# Patient Record
Sex: MALE | Race: OTHER | Employment: OTHER | URBAN - METROPOLITAN AREA
[De-identification: names, ages, dates, MRNs, and addresses within clinical notes are randomized per-mention and may not be internally consistent; named-entity substitution may affect disease eponyms.]

---

## 2017-07-20 ENCOUNTER — OFFICE VISIT (OUTPATIENT)
Dept: INTERNAL MEDICINE CLINIC | Age: 82
End: 2017-07-20

## 2017-07-20 VITALS
SYSTOLIC BLOOD PRESSURE: 182 MMHG | HEART RATE: 64 BPM | HEIGHT: 67 IN | RESPIRATION RATE: 14 BRPM | DIASTOLIC BLOOD PRESSURE: 68 MMHG | BODY MASS INDEX: 36.95 KG/M2 | WEIGHT: 235.4 LBS

## 2017-07-20 DIAGNOSIS — E78.2 MIXED HYPERLIPIDEMIA: ICD-10-CM

## 2017-07-20 DIAGNOSIS — M15.9 PRIMARY OSTEOARTHRITIS INVOLVING MULTIPLE JOINTS: ICD-10-CM

## 2017-07-20 DIAGNOSIS — I12.9 HYPERTENSIVE KIDNEY DISEASE WITH CHRONIC KIDNEY DISEASE STAGE III (HCC): Primary | ICD-10-CM

## 2017-07-20 DIAGNOSIS — N18.30 HYPERTENSIVE KIDNEY DISEASE WITH CHRONIC KIDNEY DISEASE STAGE III (HCC): Primary | ICD-10-CM

## 2017-07-20 DIAGNOSIS — C80.1 CANCER (HCC): ICD-10-CM

## 2017-07-20 DIAGNOSIS — E66.09 NON MORBID OBESITY DUE TO EXCESS CALORIES: ICD-10-CM

## 2017-07-20 DIAGNOSIS — E11.9 CONTROLLED TYPE 2 DIABETES MELLITUS WITHOUT COMPLICATION, WITHOUT LONG-TERM CURRENT USE OF INSULIN (HCC): ICD-10-CM

## 2017-07-20 DIAGNOSIS — C61 PROSTATE CANCER (HCC): ICD-10-CM

## 2017-07-20 DIAGNOSIS — N18.30 CHRONIC KIDNEY DISEASE, STAGE 3 (HCC): ICD-10-CM

## 2017-07-20 PROBLEM — Z87.891 FORMER SMOKER: Status: ACTIVE | Noted: 2017-07-20

## 2017-07-20 PROBLEM — Z12.11 COLON CANCER SCREENING: Status: ACTIVE | Noted: 2017-07-20

## 2017-07-20 PROBLEM — M19.90 DJD (DEGENERATIVE JOINT DISEASE): Status: ACTIVE | Noted: 2017-07-20

## 2017-07-20 PROBLEM — Z79.899 ON STATIN THERAPY: Status: ACTIVE | Noted: 2017-07-20

## 2017-07-20 PROBLEM — M35.3 POLYMYALGIA RHEUMATICA (HCC): Status: ACTIVE | Noted: 2017-07-20

## 2017-07-20 PROBLEM — E78.5 HYPERLIPIDEMIA: Status: ACTIVE | Noted: 2017-07-20

## 2017-07-20 PROBLEM — I49.9 CARDIAC ARRHYTHMIA: Status: ACTIVE | Noted: 2017-07-20

## 2017-07-20 LAB
BUN BLD-MCNC: 22 MG/DL (ref 9–20)
CALCIUM BLD-MCNC: 10.1 MG/DL (ref 8.4–10.2)
CHLORIDE BLD-SCNC: 105 MMOL/L (ref 98–107)
CHOLEST SERPL-MCNC: 87 MG/DL (ref 0–200)
CO2 POC: 25 MMOL/L (ref 22–32)
CREAT BLD-MCNC: 1.4 MG/DL (ref 0.8–1.5)
EGFR (POC): 46.5
GLUCOSE POC: 173 MG/DL (ref 75–110)
HBA1C MFR BLD HPLC: 7.8 % (ref 4–5.7)
HDLC SERPL-MCNC: 37 MG/DL (ref 35–130)
LDL CHOLESTEROL POC: 26.6 MG/DL (ref 0–130)
POTASSIUM SERPL-SCNC: 5 MMOL/L (ref 3.6–5)
SODIUM SERPL-SCNC: 147 MMOL/L (ref 137–145)
TCHOL/HDL RATIO (POC): 2.4 (ref 0–4)
TRIGL SERPL-MCNC: 117 MG/DL (ref 0–200)
VLDLC SERPL CALC-MCNC: 23.4 MG/DL

## 2017-07-20 RX ORDER — LISINOPRIL 40 MG/1
40 TABLET ORAL DAILY
COMMUNITY
Start: 2017-07-15 | End: 2018-01-12 | Stop reason: SDUPTHER

## 2017-07-20 RX ORDER — GLIMEPIRIDE 4 MG/1
TABLET ORAL
COMMUNITY
Start: 2017-07-15 | End: 2017-10-27 | Stop reason: ALTCHOICE

## 2017-07-20 RX ORDER — AMLODIPINE BESYLATE 5 MG/1
5 TABLET ORAL DAILY
COMMUNITY
Start: 2017-07-09 | End: 2017-10-13 | Stop reason: SDUPTHER

## 2017-07-20 RX ORDER — METFORMIN HYDROCHLORIDE 500 MG/1
1000 TABLET, EXTENDED RELEASE ORAL 2 TIMES DAILY
COMMUNITY
Start: 2017-07-15 | End: 2018-02-21 | Stop reason: SDUPTHER

## 2017-07-20 RX ORDER — VALSARTAN 160 MG/1
160 TABLET ORAL DAILY
COMMUNITY
Start: 2017-07-15 | End: 2017-10-27 | Stop reason: ALTCHOICE

## 2017-07-20 RX ORDER — ASPIRIN 81 MG/1
TABLET ORAL DAILY
COMMUNITY

## 2017-07-20 RX ORDER — LEVOCARNITINE 500 MG
TABLET ORAL
COMMUNITY
End: 2017-10-27 | Stop reason: ALTCHOICE

## 2017-07-20 RX ORDER — SIMVASTATIN 20 MG/1
20 TABLET, FILM COATED ORAL
COMMUNITY
Start: 2017-07-09 | End: 2017-09-29 | Stop reason: SDUPTHER

## 2017-07-20 RX ORDER — SITAGLIPTIN 100 MG/1
100 TABLET, FILM COATED ORAL DAILY
COMMUNITY
Start: 2017-07-09 | End: 2018-01-25 | Stop reason: SDUPTHER

## 2017-07-20 RX ORDER — EZETIMIBE 10 MG/1
10 TABLET ORAL DAILY
COMMUNITY
Start: 2017-07-09 | End: 2017-11-14 | Stop reason: SDUPTHER

## 2017-07-20 RX ORDER — GLUCOSAM/CHONDRO/HERB 149/HYAL 750-100 MG
1500 TABLET ORAL DAILY
COMMUNITY

## 2017-07-20 NOTE — PROGRESS NOTES
3 month f/up. No complaints by the patient. 1. Have you been to the ER, urgent care clinic since your last visit? Hospitalized since your last visit?  no    2. Have you seen or consulted any other health care providers outside of the [address] [place] [address] since your last visit? Include any pap smears or colon screening.    no

## 2017-07-20 NOTE — PROGRESS NOTES
Chief Complaint   Patient presents with    Diabetes    Arthritis    Hypertension    Chronic Kidney Disease    Cholesterol Problem       SUBJECTIVE:    Tripp Rodriguez is a 80 y.o. male who returns in follow-up for his hypertension diabetes hyperlipidemia obesity DJD D and other medical problems. He denies any headaches or chest pain or cardiorespiratory GI  complaints or any neurologic complaints. He is taking his medication. He is doing his best to follow his diet. He is not getting a lot of exercise. He has no other complaints on complete review of systems. Current Outpatient Prescriptions   Medication Sig Dispense Refill    amLODIPine (NORVASC) 5 mg tablet Take 5 mg by mouth daily.  ezetimibe (ZETIA) 10 mg tablet Take 10 mg by mouth daily.  glimepiride (AMARYL) 4 mg tablet Take  by mouth every morning.  lisinopril (PRINIVIL, ZESTRIL) 40 mg tablet Take 40 mg by mouth daily.  metFORMIN ER (GLUCOPHAGE XR) 500 mg tablet Take 1,000 mg by mouth two (2) times a day.  simvastatin (ZOCOR) 20 mg tablet Take 20 mg by mouth nightly.  JANUVIA 100 mg tablet Take 100 mg by mouth daily.  valsartan (DIOVAN) 160 mg tablet Take 160 mg by mouth daily.  aspirin delayed-release 81 mg tablet Take  by mouth daily.  Omega-3-DHA-EPA-Fish Oil 1,000 mg (120 mg-180 mg) cap Take 1,200 mg by mouth daily.  niacin-inositol (NIACIN NO FLUSH) 400 mg niacin (500 mg) cap Take  by mouth.        Past Medical History:   Diagnosis Date    Cancer ([facility]     prostate    Diabetes ([facility]     Hypercholesterolemia     Hypertension      Past Surgical History:   Procedure Laterality Date    HX PROSTATECTOMY       Not on File    REVIEW OF SYSTEMS:  General: negative for - chills or fever, or weight loss or gain  ENT: negative for - headaches, nasal congestion or tinnitus  Eyes: no blurred or visual changes  Neck: No stiffness or swollen nodes  Respiratory: negative for - cough, hemoptysis, shortness of breath or wheezing  Cardiovascular : negative for - chest pain, edema, palpitations or shortness of breath  Gastrointestinal: negative for - abdominal pain, blood in stools, heartburn or nausea/vomiting  Genito-Urinary: no dysuria, trouble voiding, or hematuria  Musculoskeletal: negative for - gait disturbance,+ joint pain, negative joint stiffness or joint swelling  Neurological: no TIA or stroke symptoms  Hematologic: no bruises, no bleeding  Lymphatic: no swollen glands  Integument: no lumps, mole changes, nail changes or rash  Endocrine:no malaise/lethargy poly uria or polydipsia or unexpected weight changes        Social History     Social History    Marital status: UNKNOWN     Spouse name: N/A    Number of children: N/A    Years of education: N/A     Social History Main Topics    Smoking status: Former Smoker     Packs/day: 1.00     Years: 50.00     Types: Cigarettes     Start date: 7/20/1955     Quit date: 7/20/2008    Smokeless tobacco: Never Used    Alcohol use No    Drug use: No    Sexual activity: No     Other Topics Concern    None     Social History Narrative    None     History reviewed. No pertinent family history. OBJECTIVE:     Visit Vitals    /68 (BP 1 Location: Left arm, BP Patient Position: Sitting)    Pulse 64  Comment: slightly irregular    Resp 14    Ht 5' 7\" (1.702 m)    Wt 235 lb 6.4 oz (106.8 kg)    BMI 36.87 kg/m2     CONSTITUTIONAL:   well nourished, appears age appropriate  EYES: sclera anicteric, PERRL, EOMI  ENMT:nars clear, moist mucous membranes, pharynx clear  NECK: supple.  Thyroid normal, No JVD or bruits  RESPIRATORY: Chest: clear to ascultation and percussion, normal inspiratory effort  CARDIOVASCULAR: Heart: regular rate and rhythm no murmurs, rubs or gallops, PMI not displaced, No thrills  GASTROINTESTINAL: Abdomen: non distended, soft, non tender, bowel sounds normal  HEMATOLOGIC: no purpura, petechiae or bruising  LYMPHATIC: No lymph node enlargemant  MUSCULOSKELETAL: Extremities: no edema or active synovitis, pulse 1+   INTEGUMENT: No unusual rashes or suspicious skin lesions noted. Nails appear normal.  PERIPHERAL VASCULAR: normal pulses femoral, PT and DP  NEUROLOGIC: non-focal exam, A & O X 3  PSYCHIATRIC:, appropriate affect     ASSESSMENT:   1. Hypertensive kidney disease with chronic kidney disease stage III    2. Cancer ([facility]    3. Controlled type 2 diabetes mellitus without complication, without long-term current use of insulin ([facility]    4. Mixed hyperlipidemia    5. Chronic kidney disease, stage 3    6. Primary osteoarthritis involving multiple joints    7. Prostate cancer ([facility]    8. Non morbid obesity due to excess calories      Impression  1. Hypertension blood pressure is not controlled today he thinks is because he had weights along with the paperwork and everything with the new computer system. I did review his last blood pressure and it was much better at 142/70 so I will not make any changes in medications today  2. Diabetes mellitus we will see what that status is I reviewed his last numbers  3. Hyperlipidemia reviewed his last numbers we will see what the status is today make adjustments if necessary  4. DJD that seems to be stable some chronic joint aches and pains but nothing seems to be new  5. Chronic kidney disease we will see what that status is I reviewed his last numbers  6.   Obesity that is still a problem I reviewed his weight with him today it is 235 that needs to come down we discussed diet exercise weight reduction  We will call her lab will make adjustments if necessary otherwise we will continue his current medications diet and exercise discussed and encouraged we will recheck his blood pressure in follow-up I did not make a change today as noted follow-up scheduled 3 months sooner should there be a problem      PLAN:  .  Orders Placed This Encounter    AMB POC BASIC METABOLIC PANEL    AMB POC HEMOGLOBIN A1C  AMB POC LIPID PROFILE    amLODIPine (NORVASC) 5 mg tablet    ezetimibe (ZETIA) 10 mg tablet    glimepiride (AMARYL) 4 mg tablet    lisinopril (PRINIVIL, ZESTRIL) 40 mg tablet    metFORMIN ER (GLUCOPHAGE XR) 500 mg tablet    simvastatin (ZOCOR) 20 mg tablet    JANUVIA 100 mg tablet    valsartan (DIOVAN) 160 mg tablet    aspirin delayed-release 81 mg tablet    Omega-3-DHA-EPA-Fish Oil 1,000 mg (120 mg-180 mg) cap    niacin-inositol (NIACIN NO FLUSH) 400 mg niacin (500 mg) cap         ATTENTION:   This medical record was transcribed using an electronic medical records system. Although proofread, it may and can contain electronic and spelling errors. Other human spelling and other errors may be present. Corrections may be executed at a later time. Please feel free to contact us for any clarifications as needed. Follow-up Disposition:  Return in about 3 months (around 10/20/2017). Vel Bowling MD    The patient verbalized understanding of the problems and plans as explained.

## 2017-07-20 NOTE — MR AVS SNAPSHOT
Visit Information Date & Time Provider Department Dept. Phone Encounter #  
 7/20/2017 10:40 AM Clifton Krabbe, MD [place] [address] [phone] [id number] Follow-up Instructions Return in about 3 months (around 10/20/2017). Your Appointments 7/20/2017 10:40 AM  
FOLLOW UP 10 with Clifton Krabbe, MD [facility] Appt Note: 3 month follow up [address] [id number] [address] [facility] [id number] Upcoming Health Maintenance Date Due DTaP/Tdap/Td series (1 - Tdap) 4/2/1956 ZOSTER VACCINE AGE 60> 4/2/1995 GLAUCOMA SCREENING Q2Y 4/2/2000 Pneumococcal 65+ Low/Medium Risk (1 of 2 - PCV13) 4/2/2000 MEDICARE YEARLY EXAM 4/2/2000 INFLUENZA AGE 9 TO ADULT 8/1/2017 Allergies as of 7/20/2017  Review Complete On: 7/20/2017 By: Clifton Krabbe, MD  
 Not on File Current Immunizations  Never Reviewed No immunizations on file. Not reviewed this visit You Were Diagnosed With   
  
 Codes Comments Hypertensive kidney disease with chronic kidney disease stage III    -  Primary ICD-10-CM: I12.9, N18.3 ICD-9-CM: 403.90, 585.3 Cancer [facility])     ICD-10-CM: C80.1 ICD-9-CM: 199.1 Controlled type 2 diabetes mellitus without complication, without long-term current use of insulin ([facility]     ICD-10-CM: E11.9 ICD-9-CM: 250.00 Mixed hyperlipidemia     ICD-10-CM: E78.2 ICD-9-CM: 272.2 Chronic kidney disease, stage 3     ICD-10-CM: N18.3 ICD-9-CM: 585.3 Primary osteoarthritis involving multiple joints     ICD-10-CM: M15.0 ICD-9-CM: 715.09 Prostate cancer [facility])     ICD-10-CM: J59 ICD-9-CM: 325 Non morbid obesity due to excess calories     ICD-10-CM: E66.09 
ICD-9-CM: 278.00 Vitals BP Pulse Resp Height(growth percentile) Weight(growth percentile) BMI 182/68 (BP 1 Location: Left arm, BP Patient Position: Sitting) 64 14 5' 7\" (1.702 m) 235 lb 6.4 oz (106.8 kg) 36.87 kg/m2 Smoking Status Former Smoker BMI and BSA Data Body Mass Index Body Surface Area  
 36.87 kg/m 2 2.25 m 2 Preferred Pharmacy Pharmacy Name Phone [facility] [address], [address] [place] [address] Avenue [phone] Your Updated Medication List  
  
   
This list is accurate as of: 7/20/17 10:17 AM.  Always use your most recent med list. amLODIPine 5 mg tablet Commonly known as:  Mari Coppola Take 5 mg by mouth daily. aspirin delayed-release 81 mg tablet Take  by mouth daily. ezetimibe 10 mg tablet Commonly known as:  Analilia Kessler Take 10 mg by mouth daily. glimepiride 4 mg tablet Commonly known as:  AMARYL Take  by mouth every morning. JANUVIA 100 mg tablet Generic drug:  SITagliptin Take 100 mg by mouth daily. lisinopril 40 mg tablet Commonly known as:  Clint Camarillo Take 40 mg by mouth daily. metFORMIN  mg tablet Commonly known as:  GLUCOPHAGE XR Take 1,000 mg by mouth two (2) times a day. NIACIN NO FLUSH 400 mg niacin (500 mg) Cap Generic drug:  niacin-inositol Take  by mouth. Omega-3-DHA-EPA-Fish Oil 1,000 mg (120 mg-180 mg) Cap Take 1,200 mg by mouth daily. simvastatin 20 mg tablet Commonly known as:  ZOCOR Take 20 mg by mouth nightly. valsartan 160 mg tablet Commonly known as:  DIOVAN Take 160 mg by mouth daily. We Performed the Following AMB POC BASIC METABOLIC PANEL [85872 CPT(R)] AMB POC HEMOGLOBIN A1C [60120 CPT(R)] AMB POC LIPID PROFILE [15974 CPT(R)] Follow-up Instructions Return in about 3 months (around 10/20/2017). Introducing [facility]!    
 Willadean Saint introduces [employer] patient portal. Now you can access parts of your medical record, email your doctor's office, and request medication refills online. 1. In your internet browser, go to [email or web address] 2. Click on the First Time User? Click Here link in the Sign In box. You will see the New Member Sign Up page. 3. Enter your [employer] Access Code exactly as it appears below. You will not need to use this code after youve completed the sign-up process. If you do not sign up before the expiration date, you must request a new code. · [employer] Access Code: [email or web address] Expires: 10/18/2017  9:01 AM 
 
4. Enter the last four digits of your Social Security Number (xxxx) and Date of Birth (mm/dd/yyyy) as indicated and click Submit. You will be taken to the next sign-up page. 5. Create a [employer] ID. This will be your [employer] login ID and cannot be changed, so think of one that is secure and easy to remember. 6. Create a [employer] password. You can change your password at any time. 7. Enter your Password Reset Question and Answer. This can be used at a later time if you forget your password. 8. Enter your e-mail address. You will receive e-mail notification when new information is available in [address] 9. Click Sign Up. You can now view and download portions of your medical record. 10. Click the Download Summary menu link to download a portable copy of your medical information. If you have questions, please visit the Frequently Asked Questions section of the [employer] website. Remember, [employer] is NOT to be used for urgent needs. For medical emergencies, dial 911. Now available from your iPhone and Android! Please provide this summary of care documentation to your next provider. If you have any questions after today's visit, please call [phone].

## 2017-07-21 NOTE — PROGRESS NOTES
Blood sugars 173 glycohemoglobin 7.8 both markedly above goal.  You are already on a maximum dose of Januvia 100 mg a day and a maximum dose of metformin.   At this point I would add for Farxiga 5 mg daily continue working on diet and exercise

## 2017-07-26 ENCOUNTER — TELEPHONE (OUTPATIENT)
Dept: INTERNAL MEDICINE CLINIC | Age: 82
End: 2017-07-26

## 2017-07-26 DIAGNOSIS — E11.8 CONTROLLED TYPE 2 DIABETES MELLITUS WITH COMPLICATION, WITHOUT LONG-TERM CURRENT USE OF INSULIN (HCC): Primary | ICD-10-CM

## 2017-07-26 NOTE — TELEPHONE ENCOUNTER
----- Message from Elliot Malagon MD sent at 7/21/2017  2:35 PM EDT -----  Blood sugars 173 glycohemoglobin 7.8 both markedly above goal.  You are already on a maximum dose of Januvia 100 mg a day and a maximum dose of metformin.   At this point I would add for Farxiga 5 mg daily continue working on diet and exercise

## 2017-07-26 NOTE — TELEPHONE ENCOUNTER
Blood sugars 173 glycohemoglobin 7.8 both markedly above goal.  You are already on a maximum dose of Januvia 100 mg a day and a maximum dose of metformin. At this point I would add for Farxiga 5 mg daily continue working on diet and exercise  Spoke with pt and he agreed to start [place] .

## 2017-08-02 ENCOUNTER — TELEPHONE (OUTPATIENT)
Dept: INTERNAL MEDICINE CLINIC | Age: 82
End: 2017-08-02

## 2017-08-02 NOTE — TELEPHONE ENCOUNTER
Requested Prescriptions     Pending Prescriptions Disp Refills    empagliflozin (JARDIANCE) 10 mg tablet 30 Tab 5     Sig: Take 1 Tab by mouth daily. Will replace order for Reidcarli Stephen which is not covered by pt insurance.

## 2017-08-04 NOTE — TELEPHONE ENCOUNTER
Informed by pharmacy insurance will not cover [facility]. Will need to change to Jardiance.

## 2017-09-29 DIAGNOSIS — E78.2 MIXED HYPERLIPIDEMIA: Primary | ICD-10-CM

## 2017-09-29 NOTE — TELEPHONE ENCOUNTER
Requested Prescriptions     Pending Prescriptions Disp Refills    simvastatin (ZOCOR) 20 mg tablet 90 Tab 3     Sig: Take 1 Tab by mouth nightly.

## 2017-10-02 RX ORDER — SIMVASTATIN 20 MG/1
20 TABLET, FILM COATED ORAL
Qty: 90 TAB | Refills: 3 | Status: SHIPPED | OUTPATIENT
Start: 2017-10-02 | End: 2018-11-09 | Stop reason: SDUPTHER

## 2017-10-13 RX ORDER — AMLODIPINE BESYLATE 5 MG/1
5 TABLET ORAL DAILY
Qty: 90 TAB | Refills: 3 | Status: SHIPPED | OUTPATIENT
Start: 2017-10-13 | End: 2018-11-09 | Stop reason: SDUPTHER

## 2017-10-13 NOTE — TELEPHONE ENCOUNTER
Requested Prescriptions     Pending Prescriptions Disp Refills    amLODIPine (NORVASC) 5 mg tablet 90 Tab 3     Sig: Take 1 Tab by mouth daily.

## 2017-10-27 ENCOUNTER — OFFICE VISIT (OUTPATIENT)
Dept: INTERNAL MEDICINE CLINIC | Age: 82
End: 2017-10-27

## 2017-10-27 VITALS
RESPIRATION RATE: 18 BRPM | OXYGEN SATURATION: 98 % | BODY MASS INDEX: 35.25 KG/M2 | TEMPERATURE: 97.5 F | HEART RATE: 60 BPM | SYSTOLIC BLOOD PRESSURE: 139 MMHG | HEIGHT: 67 IN | WEIGHT: 224.6 LBS | DIASTOLIC BLOOD PRESSURE: 68 MMHG

## 2017-10-27 DIAGNOSIS — N18.30 CHRONIC KIDNEY DISEASE, STAGE 3 (HCC): ICD-10-CM

## 2017-10-27 DIAGNOSIS — E66.09 NON MORBID OBESITY DUE TO EXCESS CALORIES: ICD-10-CM

## 2017-10-27 DIAGNOSIS — M15.9 PRIMARY OSTEOARTHRITIS INVOLVING MULTIPLE JOINTS: ICD-10-CM

## 2017-10-27 DIAGNOSIS — N18.30 HYPERTENSIVE KIDNEY DISEASE WITH CHRONIC KIDNEY DISEASE STAGE III (HCC): Primary | ICD-10-CM

## 2017-10-27 DIAGNOSIS — M35.3 POLYMYALGIA RHEUMATICA (HCC): ICD-10-CM

## 2017-10-27 DIAGNOSIS — E78.2 MIXED HYPERLIPIDEMIA: ICD-10-CM

## 2017-10-27 DIAGNOSIS — Z00.00 MEDICARE ANNUAL WELLNESS VISIT, INITIAL: ICD-10-CM

## 2017-10-27 DIAGNOSIS — C61 PROSTATE CANCER (HCC): ICD-10-CM

## 2017-10-27 DIAGNOSIS — Z23 ENCOUNTER FOR IMMUNIZATION: ICD-10-CM

## 2017-10-27 DIAGNOSIS — I12.9 HYPERTENSIVE KIDNEY DISEASE WITH CHRONIC KIDNEY DISEASE STAGE III (HCC): Primary | ICD-10-CM

## 2017-10-27 DIAGNOSIS — E11.9 CONTROLLED TYPE 2 DIABETES MELLITUS WITHOUT COMPLICATION, WITHOUT LONG-TERM CURRENT USE OF INSULIN (HCC): ICD-10-CM

## 2017-10-27 LAB
ALBUMIN SERPL-MCNC: 4.5 G/DL (ref 3.9–5.4)
ALKALINE PHOS POC: 67 U/L (ref 38–126)
ALT SERPL-CCNC: 37 U/L (ref 9–52)
AST SERPL-CCNC: 39 U/L (ref 14–36)
BUN BLD-MCNC: 19 MG/DL (ref 9–20)
CALCIUM BLD-MCNC: 10.4 MG/DL (ref 8.4–10.2)
CHLORIDE BLD-SCNC: 106 MMOL/L (ref 98–107)
CHOLEST SERPL-MCNC: 92 MG/DL (ref 0–200)
CK (CPK) POC: 424 U/L (ref 30–135)
CO2 POC: 25 MMOL/L (ref 22–32)
CREAT BLD-MCNC: 1.5 MG/DL (ref 0.8–1.5)
EGFR (POC): 42.7
GLUCOSE POC: 85 MG/DL (ref 75–110)
HBA1C MFR BLD HPLC: 6.3 % (ref 4.5–5.7)
HDLC SERPL-MCNC: 38 MG/DL (ref 35–130)
LDL CHOLESTEROL POC: 33.6 MG/DL (ref 0–130)
POTASSIUM SERPL-SCNC: 4.8 MMOL/L (ref 3.6–5)
PROT SERPL-MCNC: 7.6 G/DL (ref 6.3–8.2)
SODIUM SERPL-SCNC: 145 MMOL/L (ref 137–145)
TCHOL/HDL RATIO (POC): 2.4 (ref 0–4)
TOTAL BILIRUBIN POC: 1.3 MG/DL (ref 0.2–1.3)
TRIGL SERPL-MCNC: 102 MG/DL (ref 0–200)
VLDLC SERPL CALC-MCNC: 20.4 MG/DL

## 2017-10-27 NOTE — PROGRESS NOTES
This is an Initial Medicare Annual Wellness Exam (AWV) (Performed 12 months after IPPE or effective date of Medicare Part B enrollment, Once in a lifetime)    I have reviewed the patient's medical history in detail and updated the computerized patient record. He presents today for his initial annual Medicare wellness examination screening questionnaire. He is also here for follow-up of his medical problems include hypertension, hyperlipidemia, diabetes, DJD, and polymyalgia rheumatica. He denies any chest pain shortness breath or cardiorespiratory complaints. He denies any GI/ complaints. He denies headaches or neurological planes. There are no other complaints on complete review of systems. He is taking his medications and trying to follow his diet but knows he could do better with exercise however he has lost weight with his diet. History     Past Medical History:   Diagnosis Date    Cancer [facility])     prostate    Cardiac arrhythmia 7/20/2017    Colon cancer screening 7/20/2017    Diabetes ([facility]     Type II    Diabetes ([facility]     DJD (degenerative joint disease) 7/20/2017    Former smoker 7/20/2017    Hypercholesterolemia     Hyperlipidemia 7/20/2017    Hypertension     Hypertension with renal disease 7/20/2017    On statin therapy 7/20/2017    Polymyalgia rheumatica ([facility] 7/20/2017      Past Surgical History:   Procedure Laterality Date    HX PROSTATECTOMY      1969    HX PROSTATECTOMY       Current Outpatient Prescriptions   Medication Sig Dispense Refill    amLODIPine (NORVASC) 5 mg tablet Take 1 Tab by mouth daily. 90 Tab 3    simvastatin (ZOCOR) 20 mg tablet Take 1 Tab by mouth nightly. 90 Tab 3    empagliflozin (JARDIANCE) 10 mg tablet Take 1 Tab by mouth daily. 30 Tab 5    ezetimibe (ZETIA) 10 mg tablet Take 10 mg by mouth daily.  lisinopril (PRINIVIL, ZESTRIL) 40 mg tablet Take 40 mg by mouth daily.       metFORMIN ER (GLUCOPHAGE XR) 500 mg tablet Take 1,000 mg by mouth two (2) times a day.  JANUVIA 100 mg tablet Take 100 mg by mouth daily.  aspirin delayed-release 81 mg tablet Take  by mouth daily.  Omega-3-DHA-EPA-Fish Oil 1,000 mg (120 mg-180 mg) cap Take 1,200 mg by mouth daily.  glimepiride (AMARYL) 4 mg tablet Take 4 mg by mouth every morning.  nicotinic acid (NIACIN) 500 mg tablet Take 500 mg by mouth Daily (before breakfast). No Known Allergies  History reviewed. No pertinent family history. Social History   Substance Use Topics    Smoking status: Former Smoker     Packs/day: 1.00     Years: 50.00     Types: Cigarettes     Start date: 7/20/1955     Quit date: 7/20/2008    Smokeless tobacco: Never Used    Alcohol use No     Patient Active Problem List   Diagnosis Code    Hypertensive kidney disease with chronic kidney disease stage III I12.9, N18.3    Controlled type 2 diabetes mellitus without complication, without long-term current use of insulin [facility] E11.9    Mixed hyperlipidemia E78.2    Chronic kidney disease, stage 3 N18.3    Primary osteoarthritis involving multiple joints M15.0    Prostate cancer ([facility] C61    Non morbid obesity due to excess calories E66.09    On statin therapy Z79.899    Former smoker Z87.891    Colon cancer screening Z12.11    Cardiac arrhythmia I49.9    Polymyalgia rheumatica ([facility] M35.3    Medicare annual wellness visit, initial Z00.00       Depression Risk Factor Screening:     PHQ over the last two weeks 10/27/2017   Little interest or pleasure in doing things Not at all   Feeling down, depressed or hopeless Not at all   Total Score PHQ 2 0     Alcohol Risk Factor Screening: You do not drink alcohol or very rarely. Functional Ability and Level of Safety:     Hearing Loss  Hearing is good. Activities of Daily Living  The home contains: no safety equipment. Patient does total self care    Fall RiskFall Risk Assessment, last 12 mths 10/27/2017   Able to walk?  Yes   Fall in past 15 months? No       Abuse Screen  Patient is not abused    Cognitive Screening   Evaluation of Cognitive Function:  Has your family/caregiver stated any concerns about your memory: no  Normal     ROS:    Constitutional: He denies fevers, weight loss, sweats. Eyes: No blurred or double vision. ENT: No difficulty with swallowing, taste, speech or smell. Neck: no stiffness or swelling  Respiratory: No cough wheezing or shortness of breath. Cardiovascular: Denies chest pain, palpitations, unexplained indigestion or syncope. Gastrointestinal:  No changes in bowel movements, no abdominal pain, no bloating. Genitourinary:  He denies frequency, nocturia or stranguria. Extremities: No joint pain, stiffness or swelling. Neurological:  No numbness, tingling, burring paresthesias or loss of motor strength. No syncope, dizziness or frequent headache  Lymphatic: no adenopathy noted  Hematologic: no easy bruising or bleeding gums  Skin:  No recent rashes or mole changes. Psychiatric/Behavioral:  Negative for depression. Vitals:    10/27/17 1017 10/27/17 1040   BP: 149/68 139/68   Pulse: 60    Resp: 18    Temp: 97.5 °F (36.4 °C)    TempSrc: Oral    SpO2: 98%    Weight: 224 lb 9.6 oz (101.9 kg)    Height: 5' 7\" (1.702 m)    PainSc:   0 - No pain         PHYSICAL EXAM:    General appearance - alert, well appearing, and in no distress  Mental status - alert, oriented to person, place, and time  HEENT:  Ears - bilateral TM's and external ear canals clear  Eyes - pupillary responses were normal.  Extraocular muscle function intact. Lids and conjunctiva not injected. Fundoscopic exam revealed sharp disc margins. eye movements intact  Pharynx- clear with teeth in good repair. No masses were noted  Neck - supple without thyromegaly or burit. No JVD noted  Lungs - clear to auscultation and percussion  Cardiac- normal rate, regular rhythm without murmurs. PMI not displaced.   No gallop, rub or click  Abdomen - flat, soft, non-tender without palpable organomegaly or mass. No pulsatile mass was felt, and not bruit was heard. Bowel sounds were active  : Circumcised, Testes descended w/o masses  Rectal: normal sphincter tone, prostate normal, no masses, stool brown and hemacult negative  Extremities -  no clubbing cyanosis or edema. No diabetic foot changes noted  Lymphatics - no palpable lymphadenopathy, no hepatosplenomegaly  Hematologic: no petechiae or purpura  Peripheral vascular -Femoral, Dorsalis pedis and posterior tibial pulses felt without difficulty  Skin - no rash or unusual mole change noted  Neurological - Cranial nerves II-XII grossly intact. Motor strength 5/5. DTR's 2+ and symmetric. Station and gait normal.  Normal distal sensation and proprioception all toes both feet  Back exam - full range of motion, no tenderness, palpable spasm or pain on motion  Musculoskeletal - no joint tenderness, deformity or swelling      Patient Care Team   Patient Care Team:  Ruby Lazo MD as PCP - General (Internal Medicine)    Assessment/Plan   Education and counseling provided:  Are appropriate based on today's review and evaluation    ASSESSMENT:   1. Hypertensive kidney disease with chronic kidney disease stage III    2. Controlled type 2 diabetes mellitus without complication, without long-term current use of insulin ([address]    3. Mixed hyperlipidemia    4. Chronic kidney disease, stage 3    5. Primary osteoarthritis involving multiple joints    6. Non morbid obesity due to excess calories    7. Polymyalgia rheumatica ([address]    8. Prostate cancer ([address]    9. Medicare annual wellness visit, initial    10. Encounter for immunization      Impression  1. Hypertension that seem to be stable continue current treatment reviewed with him  2. Diabetes currently stable reviewed prior labs with him and repeat status pending.   3.  Hyperlipidemia prior labs reviewed repeat status pending will make adjustments based upon today's lab if necessary  4. CKD repeat status pending will make further recommendations based on today's lab  5. DJD seems stable  6. Obesity discussed diet exercise weight reduction in the absolute get his weight down to help his overall health  7. History of polymyalgia rheumatica currently asymptomatic and off prednisone for some time now  8. Prostate cancer still followed by the urologist  Flu vaccine given today. Medicare annual wellness examination screening questionnaire completed today. The results were reviewed with him and his wife. Their questions were answered. Lifestyle recommendations and modifications discussed and made. Labs pending as noted. Will make further recommendations based upon those findings. Follow stable continue same and follow-up in 3 months or sooner should be a problem. PLAN:  .  Orders Placed This Encounter    Influenza virus vaccine ([address]) 72 years and older (54669)    AMB POC URINE, MICROALBUMIN, SEMIQUANT (1 RESULT)    AMB POC LIPID PROFILE    AMB POC HEMOGLOBIN A1C    AMB POC COMPREHENSIVE METABOLIC PANEL    AMB POC CK (CPK)         ATTENTION:   This medical record was transcribed using an electronic medical records system. Although proofread, it may and can contain electronic and spelling errors. Other human spelling and other errors may be present. Corrections may be executed at a later time. Please feel free to contact us for any clarifications as needed. Follow-up Disposition:  Return in about 3 months (around 1/27/2018).       Ruby Lazo MD     Health Maintenance Due   Topic Date Due    EYE EXAM RETINAL OR DILATED Q1  04/02/1945    DTaP/Tdap/Td series (1 - Tdap) 04/02/1956    ZOSTER VACCINE AGE 60>  02/02/1995    GLAUCOMA SCREENING Q2Y  04/02/2000

## 2017-10-27 NOTE — PROGRESS NOTES
Imelda Maxwell is a 80 y.o. male  Chief Complaint   Patient presents with    Hypertension    Cholesterol Problem    Diabetes     1. Have you been to an emergency room, urgent clinic, or hospitalized since your last visit? NO  If yes, where when, and reason for visit? 2. Have seen or consulted any other health care provider since your last visit? NO  Please include any pap smears or colon screening in this section  If yes, where when, and reason for visit? 6. Do you have an Advanced Directive/ Living Will in place?  NO  If yes, do we have a copy on file NO  If no, would you like information NO

## 2017-10-27 NOTE — MR AVS SNAPSHOT
Visit Information Date & Time Provider Department Dept. Phone Encounter #  
 10/27/2017  9:40 AM Kade Hudson, Tobias De La Rosa [id number] Your Appointments 2/13/2018  8:40 AM  
FOLLOW UP 10 with MD JAVID Quinn [facility] ([address]) Appt Note: [address]  [address] [id number] [address] [facility] [id number] Upcoming Health Maintenance Date Due  
 FOOT EXAM Q1 4/2/1945 MICROALBUMIN Q1 4/2/1945 EYE EXAM RETINAL OR DILATED Q1 4/2/1945 DTaP/Tdap/Td series (1 - Tdap) 4/2/1956 ZOSTER VACCINE AGE 60> 2/2/1995 GLAUCOMA SCREENING Q2Y 4/2/2000 MEDICARE YEARLY EXAM 4/2/2000 INFLUENZA AGE 9 TO ADULT 8/1/2017 HEMOGLOBIN A1C Q6M 1/20/2018 LIPID PANEL Q1 7/20/2018 Allergies as of 10/27/2017  Review Complete On: 10/27/2017 By: Kdae Hudson MD  
 No Known Allergies Current Immunizations  Never Reviewed Name Date Influenza Vaccine 10/11/2016, 10/15/2015, 9/1/2013 Pneumococcal Vaccine (Unspecified Type) 8/25/2011, 1/1/2010, 12/2/2004 Not reviewed this visit You Were Diagnosed With   
  
 Codes Comments Hypertensive kidney disease with chronic kidney disease stage III    -  Primary ICD-10-CM: I12.9, N18.3 ICD-9-CM: 403.90, 585.3 Controlled type 2 diabetes mellitus without complication, without long-term current use of insulin ([facility]     ICD-10-CM: E11.9 ICD-9-CM: 250.00 Mixed hyperlipidemia     ICD-10-CM: E78.2 ICD-9-CM: 272.2 Chronic kidney disease, stage 3     ICD-10-CM: N18.3 ICD-9-CM: 585.3 Primary osteoarthritis involving multiple joints     ICD-10-CM: M15.0 ICD-9-CM: 715.09 Vitals BP Pulse Temp Resp Height(growth percentile) Weight(growth percentile)  149/68 (BP 1 Location: Left arm, BP Patient Position: Sitting) 60 97.5 °F (36.4 °C) (Oral) 18 5' 7\" (1.702 m) 224 lb 9.6 oz (101.9 kg) SpO2 BMI Smoking Status 98% 35.18 kg/m2 Former Smoker BMI and BSA Data Body Mass Index Body Surface Area  
 35.18 kg/m 2 2.19 m 2 Preferred Pharmacy Pharmacy Name Phone [facility] [address], [address] [place] [address] Avenue [phone] Your Updated Medication List  
  
   
This list is accurate as of: 10/27/17 10:40 AM.  Always use your most recent med list. amLODIPine 5 mg tablet Commonly known as:  Alice Marjorie Take 1 Tab by mouth daily. aspirin delayed-release 81 mg tablet Take  by mouth daily. empagliflozin 10 mg tablet Commonly known as:  Shahrzad Octave Take 1 Tab by mouth daily. ezetimibe 10 mg tablet Commonly known as:  Suellyn Guero Take 10 mg by mouth daily. glimepiride 4 mg tablet Commonly known as:  AMARYL Take 4 mg by mouth every morning. JANUVIA 100 mg tablet Generic drug:  SITagliptin Take 100 mg by mouth daily. lisinopril 40 mg tablet Commonly known as:  Aundra Tavia Take 40 mg by mouth daily. metFORMIN  mg tablet Commonly known as:  GLUCOPHAGE XR Take 1,000 mg by mouth two (2) times a day. nicotinic acid 500 mg tablet Commonly known as:  NIACIN Take 500 mg by mouth Daily (before breakfast). Omega-3-DHA-EPA-Fish Oil 1,000 mg (120 mg-180 mg) Cap Take 1,200 mg by mouth daily. simvastatin 20 mg tablet Commonly known as:  ZOCOR Take 1 Tab by mouth nightly. Introducing \[facility]\"" & HEALTH SERVICES! Shauna Bryant introduces [employer] patient portal. Now you can access parts of your medical record, email your doctor's office, and request medication refills online. 1. In your internet browser, go to [email or web address] 2. Click on the First Time User? Click Here link in the Sign In box. You will see the New Member Sign Up page. 3. Enter your [employer] Access Code exactly as it appears below. You will not need to use this code after youve completed the sign-up process. If you do not sign up before the expiration date, you must request a new code. · [employer] Access Code: [email or web address] Expires: 1/25/2018 10:40 AM 
 
4. Enter the last four digits of your Social Security Number (xxxx) and Date of Birth (mm/dd/yyyy) as indicated and click Submit. You will be taken to the next sign-up page. 5. Create a CelebCalls ID. This will be your Cele[employer]s login ID and cannot be changed, so think of one that is secure and easy to remember. 6. Create a [employer] password. You can change your password at any time. 7. Enter your Password Reset Question and Answer. This can be used at a later time if you forget your password. 8. Enter your e-mail address. You will receive e-mail notification when new information is available in [address]. 9. Click Sign Up. You can now view and download portions of your medical record. 10. Click the Download Summary menu link to download a portable copy of your medical information. If you have questions, please visit the Frequently Asked Questions section of the [employer] website. Remember, [employer]s is NOT to be used for urgent needs. For medical emergencies, dial 911. Now available from your iPhone and Android! Please provide this summary of care documentation to your next provider. Your primary care clinician is listed as Corinne. If you have any questions after today's visit, please call [phone].

## 2017-10-30 LAB — MICROALBUMIN UR TEST STR-MCNC: 100 MG/L (ref 0–20)

## 2017-11-14 DIAGNOSIS — E78.5 HYPERLIPIDEMIA, UNSPECIFIED HYPERLIPIDEMIA TYPE: Primary | ICD-10-CM

## 2017-11-14 RX ORDER — EZETIMIBE 10 MG/1
TABLET ORAL
Qty: 90 TAB | Refills: 3 | Status: SHIPPED | OUTPATIENT
Start: 2017-11-14 | End: 2018-08-17 | Stop reason: ALTCHOICE

## 2017-11-14 NOTE — TELEPHONE ENCOUNTER
Requested Prescriptions     Pending Prescriptions Disp Refills    ezetimibe (ZETIA) 10 mg tablet [Pharmacy Med Name: EZETIMIBE 10MG TAB] 90 Tab 3     Sig: TAKE ONE TABLET BY MOUTH ONCE DAILY AT BEDTIME

## 2018-01-12 DIAGNOSIS — I10 HYPERTENSION, UNSPECIFIED TYPE: Primary | ICD-10-CM

## 2018-01-12 RX ORDER — LISINOPRIL 40 MG/1
40 TABLET ORAL DAILY
Qty: 90 TAB | Refills: 3 | Status: SHIPPED | OUTPATIENT
Start: 2018-01-12 | End: 2018-02-21 | Stop reason: SDUPTHER

## 2018-01-12 NOTE — TELEPHONE ENCOUNTER
Requested Prescriptions     Pending Prescriptions Disp Refills    lisinopril (PRINIVIL, ZESTRIL) 40 mg tablet 90 Tab 3     Sig: Take 1 Tab by mouth daily.

## 2018-01-26 RX ORDER — SITAGLIPTIN 100 MG/1
TABLET, FILM COATED ORAL
Qty: 90 TAB | Refills: 3 | Status: SHIPPED | OUTPATIENT
Start: 2018-01-26 | End: 2019-02-12 | Stop reason: SDUPTHER

## 2018-01-26 RX ORDER — VALSARTAN 160 MG/1
TABLET ORAL
Qty: 90 TAB | Refills: 4 | Status: SHIPPED | OUTPATIENT
Start: 2018-01-26 | End: 2018-08-09

## 2018-02-11 PROBLEM — N18.30 CONTROLLED TYPE 2 DIABETES MELLITUS WITH STAGE 3 CHRONIC KIDNEY DISEASE, WITHOUT LONG-TERM CURRENT USE OF INSULIN (HCC): Status: ACTIVE | Noted: 2017-07-20

## 2018-02-11 PROBLEM — E11.22 CONTROLLED TYPE 2 DIABETES MELLITUS WITH STAGE 3 CHRONIC KIDNEY DISEASE, WITHOUT LONG-TERM CURRENT USE OF INSULIN (HCC): Status: ACTIVE | Noted: 2017-07-20

## 2018-02-13 ENCOUNTER — OFFICE VISIT (OUTPATIENT)
Dept: INTERNAL MEDICINE CLINIC | Age: 83
End: 2018-02-13

## 2018-02-13 VITALS
TEMPERATURE: 97.6 F | WEIGHT: 224.8 LBS | BODY MASS INDEX: 35.28 KG/M2 | OXYGEN SATURATION: 94 % | RESPIRATION RATE: 16 BRPM | DIASTOLIC BLOOD PRESSURE: 70 MMHG | HEIGHT: 67 IN | SYSTOLIC BLOOD PRESSURE: 136 MMHG | HEART RATE: 61 BPM

## 2018-02-13 DIAGNOSIS — N18.30 HYPERTENSIVE KIDNEY DISEASE WITH CHRONIC KIDNEY DISEASE STAGE III (HCC): Primary | ICD-10-CM

## 2018-02-13 DIAGNOSIS — C61 PROSTATE CANCER (HCC): ICD-10-CM

## 2018-02-13 DIAGNOSIS — E11.22 CONTROLLED TYPE 2 DIABETES MELLITUS WITH STAGE 3 CHRONIC KIDNEY DISEASE, WITHOUT LONG-TERM CURRENT USE OF INSULIN (HCC): ICD-10-CM

## 2018-02-13 DIAGNOSIS — E66.09 CLASS 2 OBESITY DUE TO EXCESS CALORIES WITHOUT SERIOUS COMORBIDITY WITH BODY MASS INDEX (BMI) OF 35.0 TO 35.9 IN ADULT: ICD-10-CM

## 2018-02-13 DIAGNOSIS — N18.30 CHRONIC KIDNEY DISEASE, STAGE 3 (HCC): ICD-10-CM

## 2018-02-13 DIAGNOSIS — E78.2 MIXED HYPERLIPIDEMIA: ICD-10-CM

## 2018-02-13 DIAGNOSIS — M35.3 POLYMYALGIA RHEUMATICA (HCC): ICD-10-CM

## 2018-02-13 DIAGNOSIS — I12.9 HYPERTENSIVE KIDNEY DISEASE WITH CHRONIC KIDNEY DISEASE STAGE III (HCC): Primary | ICD-10-CM

## 2018-02-13 DIAGNOSIS — N18.30 CONTROLLED TYPE 2 DIABETES MELLITUS WITH STAGE 3 CHRONIC KIDNEY DISEASE, WITHOUT LONG-TERM CURRENT USE OF INSULIN (HCC): ICD-10-CM

## 2018-02-13 DIAGNOSIS — M15.9 PRIMARY OSTEOARTHRITIS INVOLVING MULTIPLE JOINTS: ICD-10-CM

## 2018-02-13 LAB
ALBUMIN SERPL-MCNC: 4.3 G/DL (ref 3.9–5.4)
ALKALINE PHOS POC: 62 U/L (ref 38–126)
ALT SERPL-CCNC: 33 U/L (ref 9–52)
AST SERPL-CCNC: 27 U/L (ref 14–36)
BACTERIA UA POCT, BACTPOCT: NORMAL
BILIRUB UR QL STRIP: NEGATIVE
BUN BLD-MCNC: 22 MG/DL (ref 9–20)
CALCIUM BLD-MCNC: 10.4 MG/DL (ref 8.4–10.2)
CASTS UA POCT: 0
CHLORIDE BLD-SCNC: 107 MMOL/L (ref 98–107)
CHOLEST SERPL-MCNC: 93 MG/DL (ref 0–200)
CK (CPK) POC: 83 U/L (ref 30–135)
CLUE CELLS, CLUEPOCT: NEGATIVE
CO2 POC: 25 MMOL/L (ref 22–32)
CREAT BLD-MCNC: 1.4 MG/DL (ref 0.8–1.5)
CRYSTALS UA POCT, CRYSPOCT: NEGATIVE
EGFR (POC): 46.5
EPITHELIAL CELLS POCT: NORMAL
GLUCOSE POC: 110 MG/DL (ref 75–110)
GLUCOSE UR-MCNC: NEGATIVE MG/DL
GRAN# POC: 7.4 K/UL (ref 2–7.8)
GRAN% POC: 71.8 % (ref 37–92)
HBA1C MFR BLD HPLC: 5.9 % (ref 4.5–5.7)
HCT VFR BLD CALC: 40.3 % (ref 37–51)
HDLC SERPL-MCNC: 38 MG/DL (ref 35–130)
HGB BLD-MCNC: 13.2 G/DL (ref 12–18)
KETONES P FAST UR STRIP-MCNC: NEGATIVE MG/DL
LDL CHOLESTEROL POC: 34.4 MG/DL (ref 0–130)
LY# POC: 2.4 K/UL (ref 0.6–4.1)
LY% POC: 24 % (ref 10–58.5)
MCH RBC QN: 29.7 PG (ref 26–32)
MCHC RBC-ENTMCNC: 32.9 G/DL (ref 30–36)
MCV RBC: 90 FL (ref 80–97)
MICROALBUMIN UR TEST STR-MCNC: 20 MG/L (ref 0–20)
MID #, POC: 0.4 K/UL (ref 0–1.8)
MID% POC: 4.2 % (ref 0.1–24)
MUCUS UA POCT, MUCPOCT: NORMAL
PH UR STRIP: 5 [PH] (ref 5–7)
PLATELET # BLD: 245 K/UL (ref 140–440)
POTASSIUM SERPL-SCNC: 4.5 MMOL/L (ref 3.6–5)
PROT SERPL-MCNC: 7.6 G/DL (ref 6.3–8.2)
PROT UR QL STRIP: NORMAL
PSA SERPL-MCNC: 0.1 NG/ML (ref 0–4)
RBC # BLD: 4.46 M/UL (ref 4.2–6.3)
RBC UA POCT, RBCPOCT: NORMAL
SODIUM SERPL-SCNC: 144 MMOL/L (ref 137–145)
SP GR UR STRIP: 1.02 (ref 1.01–1.02)
T4 FREE SERPL-MCNC: 0.93 NG/DL (ref 0.71–1.85)
TCHOL/HDL RATIO (POC): 2.4 (ref 0–4)
TOTAL BILIRUBIN POC: 1 MG/DL (ref 0.2–1.3)
TRICH UA POCT, TRICHPOC: NEGATIVE
TRIGL SERPL-MCNC: 103 MG/DL (ref 0–200)
TSH BLD-ACNC: 1.8 UIU/ML (ref 0.4–4.2)
UA UROBILINOGEN AMB POC: NORMAL (ref 0.2–1)
URINALYSIS CLARITY POC: CLEAR
URINALYSIS COLOR POC: NORMAL
URINE BLOOD POC: NORMAL
URINE CULT COMMENT, POCT: NORMAL
URINE LEUKOCYTES POC: NEGATIVE
URINE NITRITES POC: NEGATIVE
VLDLC SERPL CALC-MCNC: 20.6 MG/DL
WBC # BLD: 10.2 K/UL (ref 4.1–10.9)
WBC UA POCT, WBCPOCT: NORMAL
YEAST UA POCT, YEASTPOC: NEGATIVE

## 2018-02-13 NOTE — PATIENT INSTRUCTIONS
Noninsulin Medicines for Type 2 Diabetes: Care Instructions  Your Care Instructions    There are different types of noninsulin medicines for diabetes. Each works in a different way. But they all help you control your blood sugar. Some types help your body make insulin to lower your blood sugar. Others lower how much insulin your body needs. Some can slow how fast your body digests sugars. And some can remove extra glucose through your urine. · Alpha-glucosidase inhibitors. These keep starches from breaking down. This means that they lower the amount of glucose absorbed when you eat. They don't help your body make more insulin. So they will not cause low blood sugar unless you use them with other medicines for diabetes. They include acarbose and miglitol. · DPP-4 inhibitors. These help your body raise the level of insulin after you eat. They also help your body make less of a hormone that raises blood sugar. They include linagliptin, saxagliptin, and sitagliptin. · Incretin hormones (GLP-1 receptor agonists). Your body makes a protein that can raise your insulin level. It also can lower your blood sugar and make you less hungry. You can get shots of hormones that work the same way. They include exenatide and liraglutide. · Meglitinides. These help your body release insulin. They also help slow how your body digests sugars. So they can keep your blood sugar from rising too fast after you eat. They include nateglinide and repaglinide. · Metformin. This lowers how much glucose your liver makes. And it helps you respond better to insulin. It also lowers the amount of stored sugar that your liver releases when you are not eating. · SGLT2 inhibitors. These help to remove extra glucose through your urine. They may also help some people lose weight. They include canagliflozin, dapagliflozin, and empagliflozin. · Sulfonylureas. These help your body release more insulin. Some work for many hours.  They can cause low blood sugar if you don't eat as you planned. They include glipizide and glyburide. · Thiazolidinediones. These reduce the amount of blood glucose. They also help you respond better to insulin. They include pioglitazone and rosiglitazone. You may need to take more than one medicine for diabetes. Two or more medicines may work better to lower your blood sugar level than just one does. Follow-up care is a key part of your treatment and safety. Be sure to make and go to all appointments, and call your doctor if you are having problems. It's also a good idea to know your test results and keep a list of the medicines you take. How can you care for yourself at home? · Eat a healthy diet. Get some exercise each day. This may help you to reduce how much medicine you need. · Do not take other prescription or over-the-counter medicines, vitamins, herbal products, or supplements without talking to your doctor first. Some medicines for type 2 diabetes can cause problems with other medicines or supplements. · Tell your doctor if you plan to get pregnant. Some of these drugs are not safe for pregnant women. · Be safe with medicines. Take your medicines exactly as prescribed. Meglitinides and sulfonylureas can cause your blood sugar to drop very low. Call your doctor if you think you are having a problem with your medicine. · Check your blood sugar often. You can use a glucose monitor. Keeping track can help you know how certain foods, activities, and medicines affect your blood sugar. And it can help you keep your blood sugar from getting so low that it's not safe. When should you call for help? Call 911 anytime you think you may need emergency care. For example, call if:  ? · You passed out (lost consciousness). ? · You are confused or cannot think clearly. ? · Your blood sugar is very high or very low. ? Watch closely for changes in your health, and be sure to contact your doctor if:  ? · Your blood sugar stays outside the level your doctor set for you. ? · You have any problems. Where can you learn more? Go to http://terry-farhan.info/. Enter H153 in the search box to learn more about \"Noninsulin Medicines for Type 2 Diabetes: Care Instructions. \"  Current as of: March 13, 2017  Content Version: 11.4  © 2859-6351 [employer]. Care instructions adapted under license by [employer] (which disclaims liability or warranty for this information). If you have questions about a medical condition or this instruction, always ask your healthcare professional. [address] any warranty or liability for your use of this information.

## 2018-02-13 NOTE — MR AVS SNAPSHOT
[address] [place] [address] [address] [id number] [phone] Patient: Kianna Flores MRN: [id number] EJW:[id number] Visit Information Date & Time Provider Department Dept. Phone Encounter #  
 2/13/2018  8:40 AM Jarred Cruz MD [place] [id number] [phone] [id number] Upcoming Health Maintenance Date Due  
 EYE EXAM RETINAL OR DILATED Q1 4/2/1945 DTaP/Tdap/Td series (1 - Tdap) 4/2/1956 ZOSTER VACCINE AGE 60> 2/2/1995 GLAUCOMA SCREENING Q2Y 4/2/2000 HEMOGLOBIN A1C Q6M 4/27/2018 FOOT EXAM Q1 10/27/2018 LIPID PANEL Q1 10/27/2018 MEDICARE YEARLY EXAM 10/28/2018 MICROALBUMIN Q1 10/30/2018 Allergies as of 2/13/2018  Review Complete On: 2/13/2018 By: Jarred Cruz MD  
 No Known Allergies Current Immunizations  Never Reviewed Name Date Influenza High Dose Vaccine PF 10/27/2017 Influenza Vaccine 10/11/2016, 10/15/2015, 9/1/2013 Pneumococcal Vaccine (Unspecified Type) 8/25/2011, 1/1/2010, 12/2/2004 Not reviewed this visit You Were Diagnosed With   
  
 Codes Comments Hypertensive kidney disease with chronic kidney disease stage III    -  Primary ICD-10-CM: I12.9, N18.3 ICD-9-CM: 403.90, 585.3 Controlled type 2 diabetes mellitus with stage 3 chronic kidney disease, without long-term current use of insulin (HCC)     ICD-10-CM: E11.22, N18.3 ICD-9-CM: 250.40, 585.3 Mixed hyperlipidemia     ICD-10-CM: E78.2 ICD-9-CM: 272.2 Primary osteoarthritis involving multiple joints     ICD-10-CM: M15.0 ICD-9-CM: 715.09 Class 2 obesity due to excess calories without serious comorbidity with body mass index (BMI) of 35.0 to 35.9 in adult     ICD-10-CM: E66.09, Z68.35 ICD-9-CM: 278.00, V85.35 Chronic kidney disease, stage 3     ICD-10-CM: N18.3 ICD-9-CM: 189. 3 Polymyalgia rheumatica (HCC)     ICD-10-CM: M35.3 ICD-9-CM: 992 Vitals BP Pulse Temp Resp Height(growth percentile) Weight(growth percentile) 136/70 61 97.6 °F (36.4 °C) (Oral) 16 5' 7\" (1.702 m) 224 lb 12.8 oz (102 kg) SpO2 BMI Smoking Status 94% 35.21 kg/m2 Former Smoker Vitals History BMI and BSA Data Body Mass Index Body Surface Area  
 35.21 kg/m 2 2.2 m 2 Preferred Pharmacy Pharmacy Name Phone [facility] [address] [phone] Your Updated Medication List  
  
   
This list is accurate as of: 2/13/18  9:32 AM.  Always use your most recent med list. amLODIPine 5 mg tablet Commonly known as:  Venice Thurman Take 1 Tab by mouth daily. aspirin delayed-release 81 mg tablet Take  by mouth daily. empagliflozin 10 mg tablet Commonly known as:  Geeiraida Kennel Take 1 Tab by mouth daily. ezetimibe 10 mg tablet Commonly known as:  Casimer Holts TAKE ONE TABLET BY MOUTH ONCE DAILY AT BEDTIME  
  
 glimepiride 4 mg tablet Commonly known as:  AMARYL Take 4 mg by mouth every morning. JANUVIA 100 mg tablet Generic drug:  SITagliptin TAKE ONE TABLET BY MOUTH ONCE DAILY  
  
 lisinopril 40 mg tablet Commonly known as:  Radha Cockayne Take 1 Tab by mouth daily. metFORMIN  mg tablet Commonly known as:  GLUCOPHAGE XR Take 1,000 mg by mouth two (2) times a day. nicotinic acid 500 mg tablet Commonly known as:  NIACIN Take 500 mg by mouth Daily (before breakfast). Omega-3-DHA-EPA-Fish Oil 1,000 mg (120 mg-180 mg) Cap Take 1,200 mg by mouth daily. simvastatin 20 mg tablet Commonly known as:  ZOCOR Take 1 Tab by mouth nightly. valsartan 160 mg tablet Commonly known as:  DIOVAN  
TAKE ONE TABLET BY MOUTH ONCE DAILY Introducing [facility]!    
 Jaleel Lozoya introduces [employer]Patient[employer] patient portal. Now you can access parts of your medical record, email your doctor's office, and request medication refills online. 1. In your internet browser, go to [email or web address] 2. Click on the First Time User? Click Here link in the Sign In box. You will see the New Member Sign Up page. 3. Enter your [employer] Access Code exactly as it appears below. You will not need to use this code after youve completed the sign-up process. If you do not sign up before the expiration date, you must request a new code. · [employer] Access Code: [email or web address] Expires: 5/14/2018  8:32 AM 
 
4. Enter the last four digits of your Social Security Number (xxxx) and Date of Birth (mm/dd/yyyy) as indicated and click Submit. You will be taken to the next sign-up page. 5. Create a [employer] ID. This will be your [employer] login ID and cannot be changed, so think of one that is secure and easy to remember. 6. Create a [employer] password. You can change your password at any time. 7. Enter your Password Reset Question and Answer. This can be used at a later time if you forget your password. 8. Enter your e-mail address. You will receive e-mail notification when new information is available in [address] 9. Click Sign Up. You can now view and download portions of your medical record. 10. Click the Download Summary menu link to download a portable copy of your medical information. If you have questions, please visit the Frequently Asked Questions section of the [employer] website. Remember, [employer] is NOT to be used for urgent needs. For medical emergencies, dial 911. Now available from your iPhone and Android! Please provide this summary of care documentation to your next provider. Your primary care clinician is listed as Corinne. If you have any questions after today's visit, please call [phone].

## 2018-02-13 NOTE — PROGRESS NOTES
Chief Complaint   Patient presents with    Follow-up     3 month    Hypertension       SUBJECTIVE:    Sriram Pizarro is a 80 y.o. male who returns in follow-up of his multiple medical problems include hypertension, diabetes, hyperlipidemia, CKD stage III, DJD, obesity, history of prostate cancer status post prostatectomy, as well as a history of polymyalgia rheumatica currently in remission. He currently denies any chest pain, shortness of breath, palpitations or cardiorespiratory complaints. He denies any GI or  complaints. He denies any headaches neurological planes. There are no arthritic complaints other than some chronic joint aches and pains have not changed. He has no other complaints on complete review of systems. He is trying to follow his diet as well as take his medication and he does get some exercise and remains physically active. Current Outpatient Prescriptions   Medication Sig Dispense Refill    valsartan (DIOVAN) 160 mg tablet TAKE ONE TABLET BY MOUTH ONCE DAILY 90 Tab 4    JANUVIA 100 mg tablet TAKE ONE TABLET BY MOUTH ONCE DAILY 90 Tab 3    lisinopril (PRINIVIL, ZESTRIL) 40 mg tablet Take 1 Tab by mouth daily. 90 Tab 3    ezetimibe (ZETIA) 10 mg tablet TAKE ONE TABLET BY MOUTH ONCE DAILY AT BEDTIME 90 Tab 3    amLODIPine (NORVASC) 5 mg tablet Take 1 Tab by mouth daily. 90 Tab 3    simvastatin (ZOCOR) 20 mg tablet Take 1 Tab by mouth nightly. 90 Tab 3    empagliflozin (JARDIANCE) 10 mg tablet Take 1 Tab by mouth daily. 30 Tab 5    metFORMIN ER (GLUCOPHAGE XR) 500 mg tablet Take 1,000 mg by mouth two (2) times a day.  aspirin delayed-release 81 mg tablet Take  by mouth daily.  Omega-3-DHA-EPA-Fish Oil 1,000 mg (120 mg-180 mg) cap Take 1,200 mg by mouth daily.  glimepiride (AMARYL) 4 mg tablet Take 4 mg by mouth every morning.  nicotinic acid (NIACIN) 500 mg tablet Take 500 mg by mouth Daily (before breakfast).        Past Medical History:   Diagnosis Date    Cancer [facility])     prostate    Cardiac arrhythmia 7/20/2017    Colon cancer screening 7/20/2017    Diabetes [facility]     Type II    Diabetes ([facility]     DJD (degenerative joint disease) 7/20/2017    Former smoker 7/20/2017    Hypercholesterolemia     Hyperlipidemia 7/20/2017    Hypertension     Hypertension with renal disease 7/20/2017    On statin therapy 7/20/2017    Polymyalgia rheumatica ([facility] 7/20/2017     Past Surgical History:   Procedure Laterality Date    HX PROSTATECTOMY      1969    HX PROSTATECTOMY       No Known Allergies    REVIEW OF SYSTEMS:  General: negative for - chills or fever, or weight loss or gain  ENT: negative for - headaches, nasal congestion or tinnitus  Eyes: no blurred or visual changes  Neck: No stiffness or swollen nodes  Respiratory: negative for - cough, hemoptysis, shortness of breath or wheezing  Cardiovascular : negative for - chest pain, edema, palpitations or shortness of breath  Gastrointestinal: negative for - abdominal pain, blood in stools, heartburn or nausea/vomiting  Genito-Urinary: no dysuria, trouble voiding, or hematuria  Musculoskeletal: negative for - gait disturbance, joint pain, joint stiffness or joint swelling  Neurological: no TIA or stroke symptoms  Hematologic: no bruises, no bleeding  Lymphatic: no swollen glands  Integument: no lumps, mole changes, nail changes or rash  Endocrine:no malaise/lethargy poly uria or polydipsia or unexpected weight changes        Social History     Social History    Marital status: UNKNOWN     Spouse name: N/A    Number of children: N/A    Years of education: N/A     Social History Main Topics    Smoking status: Former Smoker     Packs/day: 1.00     Years: 50.00     Types: Cigarettes     Start date: 7/20/1955     Quit date: 7/20/2008    Smokeless tobacco: Never Used    Alcohol use No    Drug use: No    Sexual activity: No     Other Topics Concern    None     Social History Narrative    ** Merged History Encounter **          History reviewed. No pertinent family history. OBJECTIVE:     Visit Vitals    /70    Pulse 61    Temp 97.6 °F (36.4 °C) (Oral)    Resp 16    Ht 5' 7\" (1.702 m)    Wt 224 lb 12.8 oz (102 kg)    SpO2 94%    BMI 35.21 kg/m2     CONSTITUTIONAL:   well nourished, appears age appropriate  EYES: sclera anicteric, PERRL, EOMI  ENMT:nars clear, moist mucous membranes, pharynx clear  NECK: supple. Thyroid normal, No JVD or bruits  RESPIRATORY: Chest: clear to ascultation and percussion, normal inspiratory effort  CARDIOVASCULAR: Heart: regular rate and rhythm no murmurs, rubs or gallops, PMI not displaced, No thrills  GASTROINTESTINAL: Abdomen: non distended, soft, non tender, bowel sounds normal  HEMATOLOGIC: no purpura, petechiae or bruising  LYMPHATIC: No lymph node enlargemant  MUSCULOSKELETAL: Extremities: no edema or active synovitis, pulse 1+ with no diabetic foot changes noted  INTEGUMENT: No unusual rashes or suspicious skin lesions noted. Nails appear normal.  PERIPHERAL VASCULAR: normal pulses femoral, PT and DP  NEUROLOGIC: non-focal exam, A & O X 3. Normal distal sensation and proprioception all toes both feet. PSYCHIATRIC:, appropriate affect     ASSESSMENT:   1. Hypertensive kidney disease with chronic kidney disease stage III    2. Controlled type 2 diabetes mellitus with stage 3 chronic kidney disease, without long-term current use of insulin ([address]    3. Mixed hyperlipidemia    4. Primary osteoarthritis involving multiple joints    5. Class 2 obesity due to excess calories without serious comorbidity with body mass index (BMI) of 35.0 to 35.9 in adult    6. Chronic kidney disease, stage 3    7. Polymyalgia rheumatica ([address]    8. Prostate cancer ([address]      Impression  1. Hypertension that is controlled continue current therapy reviewed with him  2. Diabetes repeat status pending will make adjustments as necessary based on today's lab  3.   Hyperlipidemia repeat status pending will adjust medications if necessary reviewed prior labs  4. DJD that is stable  5. Obesity discussed diet exercise weight reduction for overall health benefit. 6.  CKD stage III reviewed prior lab repeat status pending  7. Polymyalgia rheumatica currently in remission  8. History of prostate cancer status post prostatectomy PSA pending  We will call the lab make further recommendations adjustments if necessary. Follow stable continue same and follow-up schedule III months or sooner should there be a problem. PLAN:  .  Orders Placed This Encounter    AMB POC T4, FREE    AMB POC LIPID PROFILE    AMB POC HEMOGLOBIN A1C    AMB POC TSH    AMB POC URINALYSIS DIP STICK AUTO W/ MICRO     AMB POC COMPREHENSIVE METABOLIC PANEL    AMB POC COMPLETE CBC,AUTOMATED ENTER    AMB POC CK (CPK)    AMB POC PSA  (MEDICARE)    AMB POC URINE, MICROALBUMIN, SEMIQUANT (1 RESULT)         ATTENTION:   This medical record was transcribed using an electronic medical records system. Although proofread, it may and can contain electronic and spelling errors. Other human spelling and other errors may be present. Corrections may be executed at a later time. Please feel free to contact us for any clarifications as needed. Follow-up Disposition:  Return in about 3 months (around 5/13/2018). No results found for any visits on 02/13/18. Aurora Rosa MD    The patient verbalized understanding of the problems and plans as explained.

## 2018-02-13 NOTE — PROGRESS NOTES
Chief Complaint   Patient presents with    Follow-up     3 month    Hypertension     Visit Vitals    /66 (BP 1 Location: Right arm, BP Patient Position: Sitting)    Pulse 61    Temp 97.6 °F (36.4 °C) (Oral)    Resp 16    Ht 5' 7\" (1.702 m)    Wt 224 lb 12.8 oz (102 kg)    SpO2 94%    BMI 35.21 kg/m2     1. Have you been to the ER, urgent care clinic since your last visit? Hospitalized since your last visit? No    2. Have you seen or consulted any other health care providers outside of the [facility] since your last visit? Include any pap smears or colon screening.  No

## 2018-02-21 DIAGNOSIS — I10 HYPERTENSION, UNSPECIFIED TYPE: ICD-10-CM

## 2018-02-21 DIAGNOSIS — I10 ESSENTIAL HYPERTENSION: Primary | ICD-10-CM

## 2018-02-21 RX ORDER — GLIMEPIRIDE 4 MG/1
4 TABLET ORAL
Qty: 90 TAB | Refills: 3 | Status: SHIPPED | OUTPATIENT
Start: 2018-02-21 | End: 2019-05-21 | Stop reason: SDUPTHER

## 2018-02-21 RX ORDER — LISINOPRIL 40 MG/1
40 TABLET ORAL DAILY
Qty: 90 TAB | Refills: 3 | Status: SHIPPED | OUTPATIENT
Start: 2018-02-21 | End: 2019-05-21 | Stop reason: SDUPTHER

## 2018-02-21 RX ORDER — METFORMIN HYDROCHLORIDE 500 MG/1
1000 TABLET, EXTENDED RELEASE ORAL 2 TIMES DAILY
Qty: 360 TAB | Refills: 3 | Status: SHIPPED | OUTPATIENT
Start: 2018-02-21 | End: 2019-04-16 | Stop reason: SDUPTHER

## 2018-02-21 NOTE — TELEPHONE ENCOUNTER
RX refill request from the patient/pharmacy. Patient last seen 02- with labs, and next appt. scheduled for 05-.

## 2018-05-16 ENCOUNTER — OFFICE VISIT (OUTPATIENT)
Dept: INTERNAL MEDICINE CLINIC | Age: 83
End: 2018-05-16

## 2018-05-16 VITALS
WEIGHT: 227.4 LBS | DIASTOLIC BLOOD PRESSURE: 64 MMHG | SYSTOLIC BLOOD PRESSURE: 130 MMHG | BODY MASS INDEX: 35.69 KG/M2 | HEART RATE: 68 BPM | HEIGHT: 67 IN | TEMPERATURE: 97.6 F | OXYGEN SATURATION: 96 % | RESPIRATION RATE: 18 BRPM

## 2018-05-16 DIAGNOSIS — E78.2 MIXED HYPERLIPIDEMIA: ICD-10-CM

## 2018-05-16 DIAGNOSIS — E11.22 CONTROLLED TYPE 2 DIABETES MELLITUS WITH STAGE 3 CHRONIC KIDNEY DISEASE, WITHOUT LONG-TERM CURRENT USE OF INSULIN (HCC): ICD-10-CM

## 2018-05-16 DIAGNOSIS — M15.9 PRIMARY OSTEOARTHRITIS INVOLVING MULTIPLE JOINTS: ICD-10-CM

## 2018-05-16 DIAGNOSIS — N18.30 HYPERTENSIVE KIDNEY DISEASE WITH CHRONIC KIDNEY DISEASE STAGE III (HCC): Primary | ICD-10-CM

## 2018-05-16 DIAGNOSIS — N18.30 CONTROLLED TYPE 2 DIABETES MELLITUS WITH STAGE 3 CHRONIC KIDNEY DISEASE, WITHOUT LONG-TERM CURRENT USE OF INSULIN (HCC): ICD-10-CM

## 2018-05-16 DIAGNOSIS — I12.9 HYPERTENSIVE KIDNEY DISEASE WITH CHRONIC KIDNEY DISEASE STAGE III (HCC): Primary | ICD-10-CM

## 2018-05-16 DIAGNOSIS — N18.30 CHRONIC KIDNEY DISEASE, STAGE 3 (HCC): ICD-10-CM

## 2018-05-16 DIAGNOSIS — E66.09 CLASS 2 OBESITY DUE TO EXCESS CALORIES WITHOUT SERIOUS COMORBIDITY WITH BODY MASS INDEX (BMI) OF 35.0 TO 35.9 IN ADULT: ICD-10-CM

## 2018-05-16 DIAGNOSIS — E66.01 SEVERE OBESITY (BMI 35.0-39.9) WITH COMORBIDITY (HCC): ICD-10-CM

## 2018-05-16 DIAGNOSIS — M35.3 POLYMYALGIA RHEUMATICA (HCC): ICD-10-CM

## 2018-05-16 LAB
ALBUMIN SERPL-MCNC: 4.1 G/DL (ref 3.9–5.4)
ALKALINE PHOS POC: 69 U/L (ref 38–126)
ALT SERPL-CCNC: 27 U/L (ref 9–52)
AST SERPL-CCNC: 25 U/L (ref 14–36)
BUN BLD-MCNC: 22 MG/DL (ref 9–20)
CALCIUM BLD-MCNC: 10.2 MG/DL (ref 8.4–10.2)
CHLORIDE BLD-SCNC: 106 MMOL/L (ref 98–107)
CHOLEST SERPL-MCNC: 87 MG/DL (ref 0–200)
CO2 POC: 25 MMOL/L (ref 22–32)
CREAT BLD-MCNC: 1.4 MG/DL (ref 0.8–1.5)
EGFR (POC): 46.1
GLUCOSE POC: 98 MG/DL (ref 75–110)
HBA1C MFR BLD HPLC: 6.3 % (ref 4.5–5.7)
HDLC SERPL-MCNC: 40 MG/DL (ref 35–130)
LDL CHOLESTEROL POC: 27.8 MG/DL (ref 0–130)
POTASSIUM SERPL-SCNC: 4.9 MMOL/L (ref 3.6–5)
PROT SERPL-MCNC: 7.3 G/DL (ref 6.3–8.2)
SODIUM SERPL-SCNC: 142 MMOL/L (ref 137–145)
TCHOL/HDL RATIO (POC): 2.2 (ref 0–4)
TOTAL BILIRUBIN POC: 1.2 MG/DL (ref 0.2–1.3)
TRIGL SERPL-MCNC: 96 MG/DL (ref 0–200)
VLDLC SERPL CALC-MCNC: 19.2 MG/DL

## 2018-05-16 NOTE — PROGRESS NOTES
Chief Complaint   Patient presents with    Diabetes     3 month follow up     Chronic Kidney Disease    Obesity     1. Have you been to the ER, urgent care clinic since your last visit? Hospitalized since your last visit? No    2. Have you seen or consulted any other health care providers outside of the [address] [place] [address] since your last visit? Include any pap smears or colon screening.  No     Fasting

## 2018-05-16 NOTE — PATIENT INSTRUCTIONS
Noninsulin Medicines for Type 2 Diabetes: Care Instructions  Your Care Instructions    There are different types of noninsulin medicines for diabetes. Each works in a different way. But they all help you control your blood sugar. Some types help your body make insulin to lower your blood sugar. Others lower how much insulin your body needs. Some can slow how fast your body digests sugars. And some can remove extra glucose through your urine. · Alpha-glucosidase inhibitors. These keep starches from breaking down. This means that they lower the amount of glucose absorbed when you eat. They don't help your body make more insulin. So they will not cause low blood sugar unless you use them with other medicines for diabetes. They include acarbose and miglitol. · DPP-4 inhibitors. These help your body raise the level of insulin after you eat. They also help your body make less of a hormone that raises blood sugar. They include linagliptin, saxagliptin, and sitagliptin. · Incretin hormones (GLP-1 receptor agonists). Your body makes a protein that can raise your insulin level. It also can lower your blood sugar and make you less hungry. You can get shots of hormones that work the same way. They include exenatide and liraglutide. · Meglitinides. These help your body release insulin. They also help slow how your body digests sugars. So they can keep your blood sugar from rising too fast after you eat. They include nateglinide and repaglinide. · Metformin. This lowers how much glucose your liver makes. And it helps you respond better to insulin. It also lowers the amount of stored sugar that your liver releases when you are not eating. · SGLT2 inhibitors. These help to remove extra glucose through your urine. They may also help some people lose weight. They include canagliflozin, dapagliflozin, and empagliflozin. · Sulfonylureas. These help your body release more insulin. Some work for many hours.  They can cause low blood sugar if you don't eat as you planned. They include glipizide and glyburide. · Thiazolidinediones. These reduce the amount of blood glucose. They also help you respond better to insulin. They include pioglitazone and rosiglitazone. You may need to take more than one medicine for diabetes. Two or more medicines may work better to lower your blood sugar level than just one does. Follow-up care is a key part of your treatment and safety. Be sure to make and go to all appointments, and call your doctor if you are having problems. It's also a good idea to know your test results and keep a list of the medicines you take. How can you care for yourself at home? · Eat a healthy diet. Get some exercise each day. This may help you to reduce how much medicine you need. · Do not take other prescription or over-the-counter medicines, vitamins, herbal products, or supplements without talking to your doctor first. Some medicines for type 2 diabetes can cause problems with other medicines or supplements. · Tell your doctor if you plan to get pregnant. Some of these drugs are not safe for pregnant women. · Be safe with medicines. Take your medicines exactly as prescribed. Meglitinides and sulfonylureas can cause your blood sugar to drop very low. Call your doctor if you think you are having a problem with your medicine. · Check your blood sugar often. You can use a glucose monitor. Keeping track can help you know how certain foods, activities, and medicines affect your blood sugar. And it can help you keep your blood sugar from getting so low that it's not safe. When should you call for help? Call 911 anytime you think you may need emergency care. For example, call if:  ? · You passed out (lost consciousness). ? · You are confused or cannot think clearly. ? · Your blood sugar is very high or very low. ? Watch closely for changes in your health, and be sure to contact your doctor if:  ? · Your blood sugar stays outside the level your doctor set for you. ? · You have any problems. Where can you learn more? Go to http://terry-farhan.info/. Enter H153 in the search box to learn more about \"Noninsulin Medicines for Type 2 Diabetes: Care Instructions. \"  Current as of: March 13, 2017  Content Version: 11.4  © 7701-0152 [employer]. Care instructions adapted under license by [employer] (which disclaims liability or warranty for this information). If you have questions about a medical condition or this instruction, always ask your healthcare professional. [address] any warranty or liability for your use of this information.

## 2018-05-16 NOTE — MR AVS SNAPSHOT
[address] [place] [address] 70 [address] [address] [phone] Patient: Tripp Rodriguez MRN: [id number] DUH:[birth year] Visit Information Date & Time Provider Department Dept. Phone Encounter #  
 5/16/2018  9:50 AM Tobias Celeste Nickalyssa [id number] Follow-up Instructions Return in about 3 months (around 8/16/2018). Your Appointments 8/17/2018  9:20 AM  
FOLLOW UP 10 with MD LATONYA Celeste [facility] ([facility]) Appt Note: [address] [id number] [address] [phone] Upcoming Health Maintenance Date Due DTaP/Tdap/Td series (1 - Tdap) 4/2/1956 ZOSTER VACCINE AGE 60> 2/2/1995 Influenza Age 5 to Adult 8/1/2018 HEMOGLOBIN A1C Q6M 8/13/2018 MEDICARE YEARLY EXAM 10/28/2018 EYE EXAM RETINAL OR DILATED Q1 2/9/2019 FOOT EXAM Q1 2/13/2019 MICROALBUMIN Q1 2/13/2019 LIPID PANEL Q1 2/13/2019 GLAUCOMA SCREENING Q2Y 2/9/2020 Allergies as of 5/16/2018  Review Complete On: 5/16/2018 By: Eh Wall MD  
 No Known Allergies Current Immunizations  Never Reviewed Name Date Influenza High Dose Vaccine PF 10/27/2017 Influenza Vaccine 10/11/2016, 10/15/2015, 9/1/2013 Pneumococcal Vaccine (Unspecified Type) 8/25/2011, 1/1/2010, 12/2/2004 Not reviewed this visit You Were Diagnosed With   
  
 Codes Comments Hypertensive kidney disease with chronic kidney disease stage III    -  Primary ICD-10-CM: I12.9, N18.3 ICD-9-CM: 403.90, 585.3 Controlled type 2 diabetes mellitus with stage 3 chronic kidney disease, without long-term current use of insulin (HCC)     ICD-10-CM: E11.22, N18.3 ICD-9-CM: 250.40, 585.3 Mixed hyperlipidemia     ICD-10-CM: E78.2 ICD-9-CM: 272.2 Primary osteoarthritis involving multiple joints     ICD-10-CM: M15.0 ICD-9-CM: 715.09 Class 2 obesity due to excess calories without serious comorbidity with body mass index (BMI) of 35.0 to 35.9 in adult     ICD-10-CM: E66.09, Z68.35 ICD-9-CM: 278.00, V85.35 Chronic kidney disease, stage 3     ICD-10-CM: N18.3 ICD-9-CM: 918. 3 Polymyalgia rheumatica (HCC)     ICD-10-CM: M35.3 ICD-9-CM: 541 Severe obesity (BMI 35.0-39. 9) with comorbidity ([facility]     ICD-10-CM: E66.01 
ICD-9-CM: 278.01 Vitals BP Pulse Temp Resp Height(growth percentile) Weight(growth percentile) 130/64 (BP 1 Location: Left arm, BP Patient Position: Sitting) 68 97.6 °F (36.4 °C) (Oral) 18 5' 7\" (1.702 m) 227 lb 6.4 oz (103.1 kg) SpO2 BMI Smoking Status 96% 35.62 kg/m2 Former Smoker Vitals History BMI and BSA Data Body Mass Index Body Surface Area  
 35.62 kg/m 2 2.21 m 2 Preferred Pharmacy Pharmacy Name Phone [facility] [address] [place] [address] Dr [address], [address] [phone] Your Updated Medication List  
  
   
This list is accurate as of 5/16/18 10:15 AM.  Always use your most recent med list. amLODIPine 5 mg tablet Commonly known as:  Cal Nev Ari Bars Take 1 Tab by mouth daily. aspirin delayed-release 81 mg tablet Take  by mouth daily. empagliflozin 10 mg tablet Commonly known as:  Margie Maker Take 1 Tab by mouth daily. ezetimibe 10 mg tablet Commonly known as:  Eligha Cashing TAKE ONE TABLET BY MOUTH ONCE DAILY AT BEDTIME  
  
 glimepiride 4 mg tablet Commonly known as:  AMARYL Take 1 Tab by mouth every morning. JANUVIA 100 mg tablet Generic drug:  SITagliptin TAKE ONE TABLET BY MOUTH ONCE DAILY  
  
 lisinopril 40 mg tablet Commonly known as:  Dorathy Massed Take 1 Tab by mouth daily. metFORMIN  mg tablet Commonly known as:  GLUCOPHAGE XR Take 2 Tabs by mouth two (2) times a day. nicotinic acid 500 mg tablet Commonly known as:  NIACIN Take 500 mg by mouth Daily (before breakfast). omega 3-DHA-EPA-fish oil 1,000 mg (120 mg-180 mg) capsule Take 1,200 mg by mouth daily. simvastatin 20 mg tablet Commonly known as:  ZOCOR Take 1 Tab by mouth nightly. valsartan 160 mg tablet Commonly known as:  DIOVAN  
TAKE ONE TABLET BY MOUTH ONCE DAILY We Performed the Following AMB POC COMPREHENSIVE METABOLIC PANEL [90447 CPT(R)] AMB POC HEMOGLOBIN A1C [70648 CPT(R)] AMB POC LIPID PROFILE [29260 CPT(R)] Follow-up Instructions Return in about 3 months (around 8/16/2018). Patient Instructions Noninsulin Medicines for Type 2 Diabetes: Care Instructions Your Care Instructions There are different types of noninsulin medicines for diabetes. Each works in a different way. But they all help you control your blood sugar. Some types help your body make insulin to lower your blood sugar. Others lower how much insulin your body needs. Some can slow how fast your body digests sugars. And some can remove extra glucose through your urine. · Alpha-glucosidase inhibitors. These keep starches from breaking down. This means that they lower the amount of glucose absorbed when you eat. They don't help your body make more insulin. So they will not cause low blood sugar unless you use them with other medicines for diabetes. They include acarbose and miglitol. · DPP-4 inhibitors. These help your body raise the level of insulin after you eat. They also help your body make less of a hormone that raises blood sugar. They include linagliptin, saxagliptin, and sitagliptin. · Incretin hormones (GLP-1 receptor agonists). Your body makes a protein that can raise your insulin level. It also can lower your blood sugar and make you less hungry. You can get shots of hormones that work the same way. They include exenatide and liraglutide. · Meglitinides. These help your body release insulin. They also help slow how your body digests sugars. So they can keep your blood sugar from rising too fast after you eat. They include nateglinide and repaglinide. · Metformin. This lowers how much glucose your liver makes. And it helps you respond better to insulin. It also lowers the amount of stored sugar that your liver releases when you are not eating. · SGLT2 inhibitors. These help to remove extra glucose through your urine. They may also help some people lose weight. They include canagliflozin, dapagliflozin, and empagliflozin. · Sulfonylureas. These help your body release more insulin. Some work for many hours. They can cause low blood sugar if you don't eat as you planned. They include glipizide and glyburide. · Thiazolidinediones. These reduce the amount of blood glucose. They also help you respond better to insulin. They include pioglitazone and rosiglitazone. You may need to take more than one medicine for diabetes. Two or more medicines may work better to lower your blood sugar level than just one does. Follow-up care is a key part of your treatment and safety. Be sure to make and go to all appointments, and call your doctor if you are having problems. It's also a good idea to know your test results and keep a list of the medicines you take. How can you care for yourself at home? · Eat a healthy diet. Get some exercise each day. This may help you to reduce how much medicine you need. · Do not take other prescription or over-the-counter medicines, vitamins, herbal products, or supplements without talking to your doctor first. Some medicines for type 2 diabetes can cause problems with other medicines or supplements. · Tell your doctor if you plan to get pregnant. Some of these drugs are not safe for pregnant women. · Be safe with medicines. Take your medicines exactly as prescribed. Meglitinides and sulfonylureas can cause your blood sugar to drop very low. Call your doctor if you think you are having a problem with your medicine. · Check your blood sugar often. You can use a glucose monitor. Keeping track can help you know how certain foods, activities, and medicines affect your blood sugar. And it can help you keep your blood sugar from getting so low that it's not safe. When should you call for help? Call 911 anytime you think you may need emergency care. For example, call if: 
? · You passed out (lost consciousness). ? · You are confused or cannot think clearly. ? · Your blood sugar is very high or very low. ? Watch closely for changes in your health, and be sure to contact your doctor if: 
? · Your blood sugar stays outside the level your doctor set for you. ? · You have any problems. Where can you learn more? Go to http://terry-farhan.info/. Enter H153 in the search box to learn more about \"Noninsulin Medicines for Type 2 Diabetes: Care Instructions. \" Current as of: March 13, 2017 Content Version: 11.4 © 8148-6488 [employer]. Care instructions adapted under license by [employer] (which disclaims liability or warranty for this information). If you have questions about a medical condition or this instruction, always ask your healthcare professional. [address] any warranty or liability for your use of this information. Introducing [facility]! Brenden Arceo introduces [employer] patient portal. Now you can access parts of your medical record, email your doctor's office, and request medication refills online. 1. In your internet browser, go to [email or web address] (formerly [employer]). [email or web address] (formerly [employer]) 2. Click on the First Time User? Click Here link in the Sign In box. You will see the New Member Sign Up page. 3. Enter your [employer] Access Code exactly as it appears below.  You will not need to use this code after youve completed the sign-up process. If you do not sign up before the expiration date, you must request a new code. · [employer] Access Code: [email or web address] Expires: 8/14/2018 10:15 AM 
 
4. Enter the last four digits of your Social Security Number (xxxx) and Date of Birth (mm/dd/yyyy) as indicated and click Submit. You will be taken to the next sign-up page. 5. Create a [employer] ID. This will be your [employer] login ID and cannot be changed, so think of one that is secure and easy to remember. 6. Create a [employer] password. You can change your password at any time. 7. Enter your Password Reset Question and Answer. This can be used at a later time if you forget your password. 8. Enter your e-mail address. You will receive e-mail notification when new information is available in [address]. 9. Click Sign Up. You can now view and download portions of your medical record. 10. Click the Download Summary menu link to download a portable copy of your medical information. If you have questions, please visit the Frequently Asked Questions section of the [employer] website. Remember, [employer] is NOT to be used for urgent needs. For medical emergencies, dial 911. Now available from your iPhone and Android! Please provide this summary of care documentation to your next provider. Your primary care clinician is listed as Corinne. If you have any questions after today's visit, please call [phone].

## 2018-05-16 NOTE — PROGRESS NOTES
Chief Complaint   Patient presents with    Diabetes     3 month follow up     Chronic Kidney Disease    Obesity       SUBJECTIVE:    Dona Quick is a 80 y.o. male who returns in follow-up of his medical problems include hypertension, diabetes, hyperlipidemia, CKD stage III, obesity, DJD, and prior history of polymyalgia rheumatica. He is taking his medications and trying to follow his diet and trying to get some exercise on a regular basis. He denies any chest pain, shortness breath, palpitations or cardiorespiratory complaints. There are no GI or  complaints. He denies any headaches, dizziness or neurological planes. There are no current arthritic complaints and no other complaints on complete review of systems. Current Outpatient Prescriptions   Medication Sig Dispense Refill    lisinopril (PRINIVIL, ZESTRIL) 40 mg tablet Take 1 Tab by mouth daily. 90 Tab 3    metFORMIN ER (GLUCOPHAGE XR) 500 mg tablet Take 2 Tabs by mouth two (2) times a day. 360 Tab 3    glimepiride (AMARYL) 4 mg tablet Take 1 Tab by mouth every morning. 90 Tab 3    valsartan (DIOVAN) 160 mg tablet TAKE ONE TABLET BY MOUTH ONCE DAILY 90 Tab 4    JANUVIA 100 mg tablet TAKE ONE TABLET BY MOUTH ONCE DAILY 90 Tab 3    ezetimibe (ZETIA) 10 mg tablet TAKE ONE TABLET BY MOUTH ONCE DAILY AT BEDTIME 90 Tab 3    amLODIPine (NORVASC) 5 mg tablet Take 1 Tab by mouth daily. 90 Tab 3    simvastatin (ZOCOR) 20 mg tablet Take 1 Tab by mouth nightly. 90 Tab 3    aspirin delayed-release 81 mg tablet Take  by mouth daily.  Omega-3-DHA-EPA-Fish Oil 1,000 mg (120 mg-180 mg) cap Take 1,200 mg by mouth daily.  nicotinic acid (NIACIN) 500 mg tablet Take 500 mg by mouth Daily (before breakfast).  empagliflozin (JARDIANCE) 10 mg tablet Take 1 Tab by mouth daily.  (Patient not taking: Reported on 5/16/2018) 30 Tab 5     Past Medical History:   Diagnosis Date    Cancer [facility])     prostate    Cardiac arrhythmia 7/20/2017    Colon cancer screening 7/20/2017    Diabetes ([facility]     Type II    Diabetes ([facility]     DJD (degenerative joint disease) 7/20/2017    Former smoker 7/20/2017    Hypercholesterolemia     Hyperlipidemia 7/20/2017    Hypertension     Hypertension with renal disease 7/20/2017    On statin therapy 7/20/2017    Polymyalgia rheumatica ([facility] 7/20/2017     Past Surgical History:   Procedure Laterality Date    HX PROSTATECTOMY      1969    HX PROSTATECTOMY       No Known Allergies    REVIEW OF SYSTEMS:  General: negative for - chills or fever, or weight loss or gain  ENT: negative for - headaches, nasal congestion or tinnitus  Eyes: no blurred or visual changes  Neck: No stiffness or swollen nodes  Respiratory: negative for - cough, hemoptysis, shortness of breath or wheezing  Cardiovascular : negative for - chest pain, edema, palpitations or shortness of breath  Gastrointestinal: negative for - abdominal pain, blood in stools, heartburn or nausea/vomiting  Genito-Urinary: no dysuria, trouble voiding, or hematuria  Musculoskeletal: negative for - gait disturbance, joint pain, joint stiffness or joint swelling  Neurological: no TIA or stroke symptoms  Hematologic: no bruises, no bleeding  Lymphatic: no swollen glands  Integument: no lumps, mole changes, nail changes or rash  Endocrine:no malaise/lethargy poly uria or polydipsia or unexpected weight changes        Social History     Social History    Marital status: UNKNOWN     Spouse name: N/A    Number of children: N/A    Years of education: N/A     Social History Main Topics    Smoking status: Former Smoker     Packs/day: 1.00     Years: 50.00     Types: Cigarettes     Start date: 7/20/1955     Quit date: 7/20/2008    Smokeless tobacco: Never Used    Alcohol use No    Drug use: No    Sexual activity: No     Other Topics Concern    None     Social History Narrative    ** Merged History Encounter **          Family History   Problem Relation Age of Onset    Heart Disease Mother     Cancer Father        OBJECTIVE:     Visit Vitals    /64 (BP 1 Location: Left arm, BP Patient Position: Sitting)    Pulse 68    Temp 97.6 °F (36.4 °C) (Oral)    Resp 18    Ht 5' 7\" (1.702 m)    Wt 227 lb 6.4 oz (103.1 kg)    SpO2 96%    BMI 35.62 kg/m2     CONSTITUTIONAL:   well nourished, appears age appropriate  EYES: sclera anicteric, PERRL, EOMI  ENMT:nares clear, moist mucous membranes, pharynx clear  NECK: supple. Thyroid normal, No JVD or bruits  RESPIRATORY: Chest: clear to ascultation and percussion, normal inspiratory effort  CARDIOVASCULAR: Heart: regular rate and rhythm no murmurs, rubs or gallops, PMI not displaced, No thrills  GASTROINTESTINAL: Abdomen: non distended, soft, non tender, bowel sounds normal  HEMATOLOGIC: no purpura, petechiae or bruising  LYMPHATIC: No lymph node enlargemant  MUSCULOSKELETAL: Extremities: no edema or active synovitis, pulse 1+   INTEGUMENT: No unusual rashes or suspicious skin lesions noted. Nails appear normal.  PERIPHERAL VASCULAR: normal pulses femoral, PT and DP  NEUROLOGIC: non-focal exam, A & O X 3  PSYCHIATRIC:, appropriate affect     ASSESSMENT:   1. Hypertensive kidney disease with chronic kidney disease stage III    2. Controlled type 2 diabetes mellitus with stage 3 chronic kidney disease, without long-term current use of insulin ([address]    3. Mixed hyperlipidemia    4. Primary osteoarthritis involving multiple joints    5. Class 2 obesity due to excess calories without serious comorbidity with body mass index (BMI) of 35.0 to 35.9 in adult    6. Chronic kidney disease, stage 3    7. Polymyalgia rheumatica ([address]    8. Severe obesity (BMI 35.0-39. 9) with comorbidity ([address]      Impression  1. Hypertension that is controlled continue current therapy reviewed with him  2. Diabetes repeat status pending reviewed prior labs make adjustments if necessary  3.   Hyperlipidemia prior labs reviewed repeat status pending will adjust if needed  4. DJD that is stable  5. Obesity discussed diet exercise weight reduction for [facility]  6. CKD stage III repeat status pending reviewed prior labs  7. Polymyalgia rheumatica by history that is currently in remission  Encouraged to work on diet exercise and weight reduction. Follow-up scheduled for 3 months or sooner should be a problem. PLAN:  .  Orders Placed This Encounter    AMB POC LIPID PROFILE    AMB POC HEMOGLOBIN A1C    AMB POC COMPREHENSIVE METABOLIC PANEL         ATTENTION:   This medical record was transcribed using an electronic medical records system. Although proofread, it may and can contain electronic and spelling errors. Other human spelling and other errors may be present. Corrections may be executed at a later time. Please feel free to contact us for any clarifications as needed. Follow-up Disposition:  Return in about 3 months (around 8/16/2018). No results found for any visits on 05/16/18. Jose Roblero MD    The patient verbalized understanding of the problems and plans as explained.

## 2018-08-09 RX ORDER — LOSARTAN POTASSIUM 50 MG/1
50 TABLET ORAL DAILY
Qty: 90 TAB | Refills: 3 | Status: SHIPPED | OUTPATIENT
Start: 2018-08-09 | End: 2019-08-20 | Stop reason: SDUPTHER

## 2018-08-09 NOTE — TELEPHONE ENCOUNTER
Requested Prescriptions     Pending Prescriptions Disp Refills    losartan (COZAAR) 50 mg tablet 90 Tab 3     Sig: Take 1 Tab by mouth daily.      Patient Last Seen:  05- with labs    Next appointment:  08-

## 2018-08-17 ENCOUNTER — OFFICE VISIT (OUTPATIENT)
Dept: INTERNAL MEDICINE CLINIC | Age: 83
End: 2018-08-17

## 2018-08-17 VITALS
RESPIRATION RATE: 15 BRPM | HEIGHT: 67 IN | BODY MASS INDEX: 35.91 KG/M2 | OXYGEN SATURATION: 95 % | WEIGHT: 228.8 LBS | SYSTOLIC BLOOD PRESSURE: 134 MMHG | DIASTOLIC BLOOD PRESSURE: 84 MMHG | HEART RATE: 60 BPM

## 2018-08-17 DIAGNOSIS — M35.3 POLYMYALGIA RHEUMATICA (HCC): ICD-10-CM

## 2018-08-17 DIAGNOSIS — E78.2 MIXED HYPERLIPIDEMIA: ICD-10-CM

## 2018-08-17 DIAGNOSIS — N18.30 HYPERTENSIVE KIDNEY DISEASE WITH CHRONIC KIDNEY DISEASE STAGE III (HCC): Primary | ICD-10-CM

## 2018-08-17 DIAGNOSIS — E66.01 SEVERE OBESITY (BMI 35.0-39.9) WITH COMORBIDITY (HCC): ICD-10-CM

## 2018-08-17 DIAGNOSIS — N18.30 CONTROLLED TYPE 2 DIABETES MELLITUS WITH STAGE 3 CHRONIC KIDNEY DISEASE, WITHOUT LONG-TERM CURRENT USE OF INSULIN (HCC): ICD-10-CM

## 2018-08-17 DIAGNOSIS — N18.30 CHRONIC KIDNEY DISEASE, STAGE 3 (HCC): ICD-10-CM

## 2018-08-17 DIAGNOSIS — I12.9 HYPERTENSIVE KIDNEY DISEASE WITH CHRONIC KIDNEY DISEASE STAGE III (HCC): Primary | ICD-10-CM

## 2018-08-17 DIAGNOSIS — E11.22 CONTROLLED TYPE 2 DIABETES MELLITUS WITH STAGE 3 CHRONIC KIDNEY DISEASE, WITHOUT LONG-TERM CURRENT USE OF INSULIN (HCC): ICD-10-CM

## 2018-08-17 DIAGNOSIS — M15.9 PRIMARY OSTEOARTHRITIS INVOLVING MULTIPLE JOINTS: ICD-10-CM

## 2018-08-17 NOTE — PROGRESS NOTES
Chief Complaint   Patient presents with    Diabetes     3 month follow up     Chronic Kidney Disease    Obesity     1. Have you been to the ER, urgent care clinic since your last visit? Hospitalized since your last visit? No    2. Have you seen or consulted any other health care providers outside of the [address] [place] [address] since your last visit? Include any pap smears or colon screening.  No     Fasting

## 2018-08-17 NOTE — MR AVS SNAPSHOT
[address] [address] [phone] Patient: Nixon Dutton MRN: [id number] NKJ:[birth year] Visit Information Date & Time Provider Department Dept. Phone Encounter #  
 8/17/2018  9:20 AM Vonda Pemberton MD [address] [place] [address] [phone] [id number] Follow-up Instructions Return in about 3 months (around 11/17/2018). Your Appointments 11/28/2018 10:00 AM  
FOLLOW UP 10 with MD JAVID [facility] ([address]) Appt Note: 3 MO FLP   yearly [address] [id number] . [facility] [address] Upcoming Health Maintenance Date Due DTaP/Tdap/Td series (1 - Tdap) 4/2/1956 ZOSTER VACCINE AGE 60> 2/2/1995 Influenza Age 5 to Adult 8/1/2018 MEDICARE YEARLY EXAM 10/28/2018 HEMOGLOBIN A1C Q6M 11/16/2018 EYE EXAM RETINAL OR DILATED Q1 2/9/2019 FOOT EXAM Q1 2/13/2019 MICROALBUMIN Q1 2/13/2019 LIPID PANEL Q1 5/16/2019 GLAUCOMA SCREENING Q2Y 2/9/2020 Allergies as of 8/17/2018  Review Complete On: 8/17/2018 By: Vonda Pemberton MD  
 No Known Allergies Current Immunizations  Never Reviewed Name Date Influenza High Dose Vaccine PF 10/27/2017 Influenza Vaccine 10/11/2016, 10/15/2015, 9/1/2013 Pneumococcal Vaccine (Unspecified Type) 8/25/2011, 1/1/2010, 12/2/2004 Not reviewed this visit You Were Diagnosed With   
  
 Codes Comments Hypertensive kidney disease with chronic kidney disease stage III    -  Primary ICD-10-CM: I12.9, N18.3 ICD-9-CM: 403.90, 585.3 Controlled type 2 diabetes mellitus with stage 3 chronic kidney disease, without long-term current use of insulin (HCC)     ICD-10-CM: E11.22, N18.3 ICD-9-CM: 250.40, 585.3 Mixed hyperlipidemia     ICD-10-CM: E78.2 ICD-9-CM: 272.2 Chronic kidney disease, stage 3     ICD-10-CM: N18.3 ICD-9-CM: 585.3 Primary osteoarthritis involving multiple joints     ICD-10-CM: M15.0 ICD-9-CM: 715.09 Severe obesity (BMI 35.0-39. 9) with comorbidity ([facility]     ICD-10-CM: E66.01 
ICD-9-CM: 278.01 Polymyalgia rheumatica (HCC)     ICD-10-CM: M35.3 ICD-9-CM: 345 Vitals BP Pulse Resp Height(growth percentile) Weight(growth percentile) SpO2  
 134/84 60 15 5' 7\" (1.702 m) 228 lb 12.8 oz (103.8 kg) 95% BMI Smoking Status 35.84 kg/m2 Former Smoker Vitals History BMI and BSA Data Body Mass Index Body Surface Area  
 35.84 kg/m 2 2.22 m 2 Preferred Pharmacy Pharmacy Name Phone [facility] [address] [place] [address] Dr [address], [address] [phone] Your Updated Medication List  
  
   
This list is accurate as of 8/17/18 10:11 AM.  Always use your most recent med list. amLODIPine 5 mg tablet Commonly known as:  Tad Marlin Take 1 Tab by mouth daily. aspirin delayed-release 81 mg tablet Take  by mouth daily. glimepiride 4 mg tablet Commonly known as:  AMARYL Take 1 Tab by mouth every morning. JANUVIA 100 mg tablet Generic drug:  SITagliptin TAKE ONE TABLET BY MOUTH ONCE DAILY  
  
 lisinopril 40 mg tablet Commonly known as:  Marc Jose Take 1 Tab by mouth daily. losartan 50 mg tablet Commonly known as:  COZAAR Take 1 Tab by mouth daily. metFORMIN  mg tablet Commonly known as:  GLUCOPHAGE XR Take 2 Tabs by mouth two (2) times a day. nicotinic acid 500 mg tablet Commonly known as:  NIACIN Take 500 mg by mouth Daily (before breakfast). omega 3-DHA-EPA-fish oil 1,000 mg (120 mg-180 mg) capsule Take 1,200 mg by mouth daily. simvastatin 20 mg tablet Commonly known as:  ZOCOR Take 1 Tab by mouth nightly. We Performed the Following CK [id number] CPT(R)] HEMOGLOBIN A1C WITH EAG [90059 CPT(R)] LIPID PANEL [01687 CPT(R)] METABOLIC PANEL, COMPREHENSIVE [21239 CPT(R)] Follow-up Instructions Return in about 3 months (around 11/17/2018). Patient Instructions Arthritis: Care Instructions Your Care Instructions Arthritis, also called osteoarthritis, is a breakdown of the cartilage that cushions your joints. When the cartilage wears down, your bones rub against each other. This causes pain and stiffness. Many people have some arthritis as they age. Arthritis most often affects the joints of the spine, hands, hips, knees, or feet. You can take simple measures to protect your joints, ease your pain, and help you stay active. Follow-up care is a key part of your treatment and safety. Be sure to make and go to all appointments, and call your doctor if you are having problems. It's also a good idea to know your test results and keep a list of the medicines you take. How can you care for yourself at home? · Stay at a healthy weight. Being overweight puts extra strain on your joints. · Talk to your doctor or physical therapist about exercises that will help ease joint pain. ¨ Stretch. You may enjoy gentle forms of yoga to help keep your joints and muscles flexible. ¨ Walk instead of jog. Other types of exercise that are less stressful on the joints include riding a bicycle, swimming, luis chi, or water exercise. ¨ Lift weights. Strong muscles help reduce stress on your joints. Stronger thigh muscles, for example, take some of the stress off of the knees and hips. Learn the right way to lift weights so you do not make joint pain worse. · Take your medicines exactly as prescribed. Call your doctor if you think you are having a problem with your medicine. · Take pain medicines exactly as directed. ¨ If the doctor gave you a prescription medicine for pain, take it as prescribed. ¨ If you are not taking a prescription pain medicine, ask your doctor if you can take an over-the-counter medicine. · Use a cane, crutch, walker, or another device if you need help to get around. These can help rest your joints. You also can use other things to make life easier, such as a higher toilet seat and padded handles on kitchen utensils. · Do not sit in low chairs, which can make it hard to get up. · Put heat or cold on your sore joints as needed. Use whichever helps you most. You also can take turns with hot and cold packs. ¨ Apply heat 2 or 3 times a day for 20 to 30 minutes-using a heating pad, hot shower, or hot pack-to relieve pain and stiffness. ¨ Put ice or a cold pack on your sore joint for 10 to 20 minutes at a time. Put a thin cloth between the ice and your skin. When should you call for help? Call your doctor now or seek immediate medical care if: 
  · You have sudden swelling, warmth, or pain in any joint.  
  · You have joint pain and a fever or rash.  
  · You have such bad pain that you cannot use a joint.  
 Watch closely for changes in your health, and be sure to contact your doctor if: 
  · You have mild joint symptoms that continue even with more than 6 weeks of care at home.  
  · You have stomach pain or other problems with your medicine. Where can you learn more? Go to http://terry-farhan.info/. Enter P104 in the search box to learn more about \"Arthritis: Care Instructions. \" Current as of: October 10, 2017 Content Version: 11.7 © 1911-4970 [employer] Evidence. Care instructions adapted under license by [employer] (which disclaims liability or warranty for this information). If you have questions about a medical condition or this instruction, always ask your healthcare professional. [address] any warranty or liability for your use of this information. Introducing [facility]! Arthur Lopez introduces Alorica patient portal. Now you can access parts of your medical record, email your doctor's office, and request medication refills online. 1. In your internet browser, go to [email or web address] 2. Click on the First Time User? Click Here link in the Sign In box. You will see the New Member Sign Up page. 3. Enter your Alorica Access Code exactly as it appears below. You will not need to use this code after youve completed the sign-up process. If you do not sign up before the expiration date, you must request a new code. · Alor[employer] Access Code: [email or web address] 
Expires: 11/15/2018 10:11 AM 
 
4. Enter the last four digits of your Social Security Number (xxxx) and Date of Birth (mm/dd/yyyy) as indicated and click Submit. You will be taken to the next sign-up page. 5. Create a Alorica ID. This will be your Alorica login ID and cannot be changed, so think of one that is secure and easy to remember. 6. Create a Alorica password. You can change your password at any time. 7. Enter your Password Reset Question and Answer. This can be used at a later time if you forget your password. 8. Enter your e-mail address. You will receive e-mail notification when new information is available in [address] 9. Click Sign Up. You can now view and download portions of your medical record. 10. Click the Download Summary menu link to download a portable copy of your medical information. If you have questions, please visit the Frequently Asked Questions section of the Alorica website. Remember, Al[employer]ica is NOT to be used for urgent needs. For medical emergencies, dial 911. Now available from your iPhone and Android! Please provide this summary of care documentation to your next provider. Your primary care clinician is listed as Corinne. If you have any questions after today's visit, please call [phone].

## 2018-08-17 NOTE — PROGRESS NOTES
Chief Complaint   Patient presents with    Diabetes     3 month follow up     Chronic Kidney Disease    Obesity       SUBJECTIVE:    Amando Mehta is a 80 y.o. male who returns in follow-up of his medical problems include hypertension, diabetes, hyperlipidemia, CKD stage III, obesity, DJD, and prior history of polymyalgia rheumatica. He is taking his medications and trying to follow his diet and trying to get some exercise on a regular basis. He denies any chest pain, shortness breath, palpitations or cardiorespiratory complaints. There are no GI or  complaints. He denies any headaches, dizziness or neurological complaints. There are no current arthritic complaints and no other complaints on complete review of systems. Current Outpatient Prescriptions   Medication Sig Dispense Refill    losartan (COZAAR) 50 mg tablet Take 1 Tab by mouth daily. 90 Tab 3    lisinopril (PRINIVIL, ZESTRIL) 40 mg tablet Take 1 Tab by mouth daily. 90 Tab 3    metFORMIN ER (GLUCOPHAGE XR) 500 mg tablet Take 2 Tabs by mouth two (2) times a day. 360 Tab 3    glimepiride (AMARYL) 4 mg tablet Take 1 Tab by mouth every morning. 90 Tab 3    JANUVIA 100 mg tablet TAKE ONE TABLET BY MOUTH ONCE DAILY 90 Tab 3    amLODIPine (NORVASC) 5 mg tablet Take 1 Tab by mouth daily. 90 Tab 3    simvastatin (ZOCOR) 20 mg tablet Take 1 Tab by mouth nightly. 90 Tab 3    aspirin delayed-release 81 mg tablet Take  by mouth daily.  Omega-3-DHA-EPA-Fish Oil 1,000 mg (120 mg-180 mg) cap Take 1,200 mg by mouth daily.  nicotinic acid (NIACIN) 500 mg tablet Take 500 mg by mouth Daily (before breakfast).        Past Medical History:   Diagnosis Date    Cancer [facility])     prostate    Cardiac arrhythmia 7/20/2017    Colon cancer screening 7/20/2017    Diabetes ([facility]     Type II    Diabetes ([facility]     DJD (degenerative joint disease) 7/20/2017    Former smoker 7/20/2017    Hypercholesterolemia     Hyperlipidemia 7/20/2017    Hypertension     Hypertension with renal disease 7/20/2017    On statin therapy 7/20/2017    Polymyalgia rheumatica ([facility] 7/20/2017     Past Surgical History:   Procedure Laterality Date    HX PROSTATECTOMY      1969    HX PROSTATECTOMY       No Known Allergies    REVIEW OF SYSTEMS:  General: negative for - chills or fever, or weight loss or gain  ENT: negative for - headaches, nasal congestion or tinnitus  Eyes: no blurred or visual changes  Neck: No stiffness or swollen nodes  Respiratory: negative for - cough, hemoptysis, shortness of breath or wheezing  Cardiovascular : negative for - chest pain, edema, palpitations or shortness of breath  Gastrointestinal: negative for - abdominal pain, blood in stools, heartburn or nausea/vomiting  Genito-Urinary: no dysuria, trouble voiding, or hematuria  Musculoskeletal: negative for - gait disturbance, joint pain, joint stiffness or joint swelling  Neurological: no TIA or stroke symptoms  Hematologic: no bruises, no bleeding  Lymphatic: no swollen glands  Integument: no lumps, mole changes, nail changes or rash  Endocrine:no malaise/lethargy poly uria or polydipsia or unexpected weight changes        Social History     Social History    Marital status: UNKNOWN     Spouse name: N/A    Number of children: N/A    Years of education: N/A     Social History Main Topics    Smoking status: Former Smoker     Packs/day: 1.00     Years: 50.00     Types: Cigarettes     Start date: 7/20/1955     Quit date: 7/20/2008    Smokeless tobacco: Never Used    Alcohol use No    Drug use: No    Sexual activity: No     Other Topics Concern    None     Social History Narrative    ** Merged History Encounter **          Family History   Problem Relation Age of Onset    Heart Disease Mother     Cancer Father        OBJECTIVE:     Visit Vitals    /84    Pulse 60    Resp 15    Ht 5' 7\" (1.702 m)    Wt 228 lb 12.8 oz (103.8 kg)    SpO2 95%    BMI 35.84 kg/m2 CONSTITUTIONAL:   well nourished, appears age appropriate  EYES: sclera anicteric, PERRL, EOMI  ENMT:nares clear, moist mucous membranes, pharynx clear  NECK: supple. Thyroid normal, No JVD or bruits  RESPIRATORY: Chest: clear to ascultation and percussion, normal inspiratory effort  CARDIOVASCULAR: Heart: regular rate and rhythm no murmurs, rubs or gallops, PMI not displaced, No thrills  GASTROINTESTINAL: Abdomen: non distended, soft, non tender, bowel sounds normal  HEMATOLOGIC: no purpura, petechiae or bruising  LYMPHATIC: No lymph node enlargemant  MUSCULOSKELETAL: Extremities: no edema or active synovitis, pulse 1+   INTEGUMENT: No unusual rashes or suspicious skin lesions noted. Nails appear normal.  PERIPHERAL VASCULAR: normal pulses femoral, PT and DP  NEUROLOGIC: non-focal exam, A & O X 3  PSYCHIATRIC:, appropriate affect     ASSESSMENT:   1. Hypertensive kidney disease with chronic kidney disease stage III    2. Controlled type 2 diabetes mellitus with stage 3 chronic kidney disease, without long-term current use of insulin ([address]    3. Mixed hyperlipidemia    4. Chronic kidney disease, stage 3    5. Primary osteoarthritis involving multiple joints    6. Severe obesity (BMI 35.0-39. 9) with comorbidity ([address]    7. Polymyalgia rheumatica (HCC)      Impression  1. Hypertension that is controlled so continue current therapy reviewed with him  2. Diabetes repeat status pending and I reviewed prior labs and I will make adjustments if necessary  3. Hyperlipidemia prior labs reviewed and repeat status pending and I will adjust if needed  4. DJD that is stable  5. Obesity discussed diet, exercise and weight reduction for overall health benefit  6. CKD stage III repeat status pending reviewed prior labs  7. Polymyalgia rheumatica by history that is currently in remission  Encouraged to work on diet exercise and weight reduction. Follow-up scheduled for 3 months or sooner should be a problem.   I will call with lab results. PLAN:  .  Orders Placed This Encounter    HEMOGLOBIN A1C WITH EAG    METABOLIC PANEL, COMPREHENSIVE    LIPID PANEL    CK         ATTENTION:   This medical record was transcribed using an electronic medical records system. Although proofread, it may and can contain electronic and spelling errors. Other human spelling and other errors may be present. Corrections may be executed at a later time. Please feel free to contact us for any clarifications as needed. Follow-up Disposition:  Return in about 3 months (around 11/17/2018). No results found for any visits on 08/17/18. Jeremiah Arteaga MD    The patient verbalized understanding of the problems and plans as explained.

## 2018-08-17 NOTE — PATIENT INSTRUCTIONS
Arthritis: Care Instructions  Your Care Instructions  Arthritis, also called osteoarthritis, is a breakdown of the cartilage that cushions your joints. When the cartilage wears down, your bones rub against each other. This causes pain and stiffness. Many people have some arthritis as they age. Arthritis most often affects the joints of the spine, hands, hips, knees, or feet. You can take simple measures to protect your joints, ease your pain, and help you stay active. Follow-up care is a key part of your treatment and safety. Be sure to make and go to all appointments, and call your doctor if you are having problems. It's also a good idea to know your test results and keep a list of the medicines you take. How can you care for yourself at home? · Stay at a healthy weight. Being overweight puts extra strain on your joints. · Talk to your doctor or physical therapist about exercises that will help ease joint pain. ¨ Stretch. You may enjoy gentle forms of yoga to help keep your joints and muscles flexible. ¨ Walk instead of jog. Other types of exercise that are less stressful on the joints include riding a bicycle, swimming, luis chi, or water exercise. ¨ Lift weights. Strong muscles help reduce stress on your joints. Stronger thigh muscles, for example, take some of the stress off of the knees and hips. Learn the right way to lift weights so you do not make joint pain worse. · Take your medicines exactly as prescribed. Call your doctor if you think you are having a problem with your medicine. · Take pain medicines exactly as directed. ¨ If the doctor gave you a prescription medicine for pain, take it as prescribed. ¨ If you are not taking a prescription pain medicine, ask your doctor if you can take an over-the-counter medicine. · Use a cane, crutch, walker, or another device if you need help to get around. These can help rest your joints.  You also can use other things to make life easier, such as a higher toilet seat and padded handles on kitchen utensils. · Do not sit in low chairs, which can make it hard to get up. · Put heat or cold on your sore joints as needed. Use whichever helps you most. You also can take turns with hot and cold packs. ¨ Apply heat 2 or 3 times a day for 20 to 30 minutes-using a heating pad, hot shower, or hot pack-to relieve pain and stiffness. ¨ Put ice or a cold pack on your sore joint for 10 to 20 minutes at a time. Put a thin cloth between the ice and your skin. When should you call for help? Call your doctor now or seek immediate medical care if:    · You have sudden swelling, warmth, or pain in any joint.     · You have joint pain and a fever or rash.     · You have such bad pain that you cannot use a joint.    Watch closely for changes in your health, and be sure to contact your doctor if:    · You have mild joint symptoms that continue even with more than 6 weeks of care at home.     · You have stomach pain or other problems with your medicine. Where can you learn more? Go to http://terry-farhan.info/. Enter K749 in the search box to learn more about \"Arthritis: Care Instructions. \"  Current as of: October 10, 2017  Content Version: 11.7  © 3490-9343 [employer]. Care instructions adapted under license by [employer] (which disclaims liability or warranty for this information). If you have questions about a medical condition or this instruction, always ask your healthcare professional. [place] [address] any warranty or liability for your use of this information.

## 2018-08-18 LAB
ALBUMIN SERPL-MCNC: 4.5 G/DL (ref 3.5–4.7)
ALBUMIN/GLOB SERPL: 1.6 {RATIO} (ref 1.2–2.2)
ALP SERPL-CCNC: 57 IU/L (ref 39–117)
ALT SERPL-CCNC: 23 IU/L (ref 0–44)
AST SERPL-CCNC: 25 IU/L (ref 0–40)
BILIRUB SERPL-MCNC: 1.1 MG/DL (ref 0–1.2)
BUN SERPL-MCNC: 20 MG/DL (ref 8–27)
BUN/CREAT SERPL: 12 (ref 10–24)
CALCIUM SERPL-MCNC: 10.3 MG/DL (ref 8.6–10.2)
CHLORIDE SERPL-SCNC: 104 MMOL/L (ref 96–106)
CHOLEST SERPL-MCNC: 104 MG/DL (ref 100–199)
CK SERPL-CCNC: 112 U/L (ref 24–204)
CO2 SERPL-SCNC: 22 MMOL/L (ref 20–29)
CREAT SERPL-MCNC: 1.73 MG/DL (ref 0.76–1.27)
EST. AVERAGE GLUCOSE BLD GHB EST-MCNC: 143 MG/DL
GLOBULIN SER CALC-MCNC: 2.9 G/DL (ref 1.5–4.5)
GLUCOSE SERPL-MCNC: 104 MG/DL (ref 65–99)
HBA1C MFR BLD: 6.6 % (ref 4.8–5.6)
HDLC SERPL-MCNC: 33 MG/DL
LDLC SERPL CALC-MCNC: 46 MG/DL (ref 0–99)
POTASSIUM SERPL-SCNC: 5.1 MMOL/L (ref 3.5–5.2)
PROT SERPL-MCNC: 7.4 G/DL (ref 6–8.5)
SODIUM SERPL-SCNC: 142 MMOL/L (ref 134–144)
TRIGL SERPL-MCNC: 127 MG/DL (ref 0–149)
VLDLC SERPL CALC-MCNC: 25 MG/DL (ref 5–40)

## 2018-09-27 ENCOUNTER — CLINICAL SUPPORT (OUTPATIENT)
Dept: INTERNAL MEDICINE CLINIC | Age: 83
End: 2018-09-27

## 2018-09-27 DIAGNOSIS — Z23 ENCOUNTER FOR IMMUNIZATION: ICD-10-CM

## 2018-09-27 NOTE — PROGRESS NOTES
After obtaining consent, and per verbal order from Dr. Nikhil Tovar, patient received influenza vaccine given by Dony Hathaway LPN in Left Deltoid. Influenza Vaccine 0.5 mL IM now. Patient was observed for 15 minutes post injection. Patient tolerated injection well with no adverse effects. VIS given.

## 2018-09-27 NOTE — MR AVS SNAPSHOT
[address] [facility] 
 
 
 [address] [address] [phone] Patient: Rich Mcallister MRN: [id number] Visit Information Date & Time Provider Department Dept. Phone Encounter #  
 9/27/2018  2:30 PM Lucila Jacobs [facility] [phone] [id number] Your Appointments 11/28/2018 10:00 AM  
FOLLOW UP 10 with MD [facility]AVID [facility] ([facility]) Appt Note: 3 MO FLP   yearly [address] [address] [address] [address] Upcoming Health Maintenance Date Due DTaP/Tdap/Td series (1 - Tdap) 4/2/1956 Shingrix Vaccine Age 50> (1 of 2) 4/2/1985 Influenza Age 5 to Adult 8/1/2018 MEDICARE YEARLY EXAM 10/28/2018 EYE EXAM RETINAL OR DILATED Q1 2/9/2019 FOOT EXAM Q1 2/13/2019 MICROALBUMIN Q1 2/13/2019 HEMOGLOBIN A1C Q6M 2/17/2019 LIPID PANEL Q1 8/17/2019 GLAUCOMA SCREENING Q2Y 2/9/2020 Allergies as of 9/27/2018  Review Complete On: 8/17/2018 By: Yadiel Clayton MD  
 No Known Allergies Current Immunizations  Never Reviewed Name Date Influenza High Dose Vaccine PF 10/27/2017 Influenza Vaccine 10/11/2016, 10/15/2015, 9/1/2013 Pneumococcal Vaccine (Unspecified Type) 8/25/2011, 1/1/2010, 12/2/2004 Not reviewed this visit Vitals Smoking Status Former Smoker Your Updated Medication List  
  
   
This list is accurate as of 9/27/18  2:30 PM.  Always use your most recent med list. amLODIPine 5 mg tablet Commonly known as:  Alice Marjorie Take 1 Tab by mouth daily. aspirin delayed-release 81 mg tablet Take  by mouth daily. glimepiride 4 mg tablet Commonly known as:  AMARYL Take 1 Tab by mouth every morning. JANUVIA 100 mg tablet Generic drug:  SITagliptin TAKE ONE TABLET BY MOUTH ONCE DAILY  
  
 lisinopril 40 mg tablet Commonly known as:  Inderjit Parkeyshawn Take 1 Tab by mouth daily. losartan 50 mg tablet Commonly known as:  COZAAR Take 1 Tab by mouth daily. metFORMIN  mg tablet Commonly known as:  GLUCOPHAGE XR Take 2 Tabs by mouth two (2) times a day. nicotinic acid 500 mg tablet Commonly known as:  NIACIN Take 500 mg by mouth Daily (before breakfast). omega 3-DHA-EPA-fish oil 1,000 mg (120 mg-180 mg) capsule Take 1,200 mg by mouth daily. simvastatin 20 mg tablet Commonly known as:  ZOCOR Take 1 Tab by mouth nightly. Introducing \[facility] [place] [facility]\"" & HEALTH SERVICES! [employer] introduces [employer] patient portal. Now you can access parts of your medical record, email your doctor's office, and request medication refills online. 1. In your internet browser, go to [email or web address] 2. Click on the First Time User? Click Here link in the Sign In box. You will see the New Member Sign Up page. 3. Enter your [employer] Access Code exactly as it appears below. You will not need to use this code after youve completed the sign-up process. If you do not sign up before the expiration date, you must request a new code. · [employer] Access Code: [email or web address] 
Expires: 11/15/2018 10:11 AM 
 
4. Enter the last four digits of your Social Security Number (xxxx) and Date of Birth (mm/dd/yyyy) as indicated and click Submit. You will be taken to the next sign-up page. 5. Create a [employer] ID. This will be your [employer] login ID and cannot be changed, so think of one that is secure and easy to remember. 6. Create a [employer] password. You can change your password at any time. 7. Enter your Password Reset Question and Answer. This can be used at a later time if you forget your password. 8. Enter your e-mail address.  You will receive e-mail notification when new information is available in [employer]. 9. Click Sign Up. You can now view and download portions of your medical record. 10. Click the Download Summary menu link to download a portable copy of your medical information. If you have questions, please visit the Frequently Asked Questions section of the [employer] website. Remember, [employer] is NOT to be used for urgent needs. For medical emergencies, dial 911. Now available from your iPhone and Android! Please provide this summary of care documentation to your next provider. Your primary care clinician is listed as Corinne. If you have any questions after today's visit, please call [phone].

## 2018-11-09 DIAGNOSIS — E78.5 HYPERLIPIDEMIA, UNSPECIFIED HYPERLIPIDEMIA TYPE: ICD-10-CM

## 2018-11-09 DIAGNOSIS — E78.2 MIXED HYPERLIPIDEMIA: ICD-10-CM

## 2018-11-09 RX ORDER — SIMVASTATIN 20 MG/1
TABLET, FILM COATED ORAL
Qty: 90 TAB | Refills: 3 | Status: SHIPPED | OUTPATIENT
Start: 2018-11-09 | End: 2019-11-19 | Stop reason: SDUPTHER

## 2018-11-09 RX ORDER — EZETIMIBE 10 MG/1
TABLET ORAL
Qty: 90 TAB | Refills: 3 | Status: SHIPPED | OUTPATIENT
Start: 2018-11-09 | End: 2019-11-19 | Stop reason: SDUPTHER

## 2018-11-09 RX ORDER — AMLODIPINE BESYLATE 5 MG/1
TABLET ORAL
Qty: 90 TAB | Refills: 3 | Status: SHIPPED | OUTPATIENT
Start: 2018-11-09 | End: 2019-11-19 | Stop reason: SDUPTHER

## 2018-11-09 NOTE — TELEPHONE ENCOUNTER
Patient Last Seen:  08- with labs    Last labs done: NA    Next appointment:  11-      Requested Prescriptions     Pending Prescriptions Disp Refills    amLODIPine (NORVASC) 5 mg tablet [Pharmacy Med Name: AMLODIPINE 5MG TAB] 90 Tab 3     Sig: TAKE ONE TABLET BY MOUTH ONCE DAILY    simvastatin (ZOCOR) 20 mg tablet [Pharmacy Med Name: SIMVASTATIN 20MG  TAB] 90 Tab 3     Sig: TAKE ONE TABLET BY MOUTH ONCE DAILY AT  NIGHT    ezetimibe (ZETIA) 10 mg tablet [Pharmacy Med Name: EZETIMIBE 10MG TAB] 90 Tab 3     Sig: TAKE ONE TABLET BY MOUTH ONCE DAILY AT BEDTIME

## 2018-12-03 ENCOUNTER — OFFICE VISIT (OUTPATIENT)
Dept: INTERNAL MEDICINE CLINIC | Age: 83
End: 2018-12-03

## 2018-12-03 VITALS
BODY MASS INDEX: 35.28 KG/M2 | OXYGEN SATURATION: 96 % | WEIGHT: 224.8 LBS | HEIGHT: 67 IN | DIASTOLIC BLOOD PRESSURE: 62 MMHG | HEART RATE: 69 BPM | SYSTOLIC BLOOD PRESSURE: 120 MMHG | RESPIRATION RATE: 15 BRPM

## 2018-12-03 DIAGNOSIS — N18.30 CONTROLLED TYPE 2 DIABETES MELLITUS WITH STAGE 3 CHRONIC KIDNEY DISEASE, WITHOUT LONG-TERM CURRENT USE OF INSULIN (HCC): ICD-10-CM

## 2018-12-03 DIAGNOSIS — C61 PROSTATE CANCER (HCC): ICD-10-CM

## 2018-12-03 DIAGNOSIS — I12.9 HYPERTENSIVE KIDNEY DISEASE WITH CHRONIC KIDNEY DISEASE STAGE III (HCC): Primary | ICD-10-CM

## 2018-12-03 DIAGNOSIS — N18.30 HYPERTENSIVE KIDNEY DISEASE WITH CHRONIC KIDNEY DISEASE STAGE III (HCC): Primary | ICD-10-CM

## 2018-12-03 DIAGNOSIS — N18.30 CHRONIC KIDNEY DISEASE, STAGE 3 (HCC): ICD-10-CM

## 2018-12-03 DIAGNOSIS — M35.3 POLYMYALGIA RHEUMATICA (HCC): ICD-10-CM

## 2018-12-03 DIAGNOSIS — E78.2 MIXED HYPERLIPIDEMIA: ICD-10-CM

## 2018-12-03 DIAGNOSIS — E66.01 SEVERE OBESITY (BMI 35.0-39.9) WITH COMORBIDITY (HCC): ICD-10-CM

## 2018-12-03 DIAGNOSIS — E11.22 CONTROLLED TYPE 2 DIABETES MELLITUS WITH STAGE 3 CHRONIC KIDNEY DISEASE, WITHOUT LONG-TERM CURRENT USE OF INSULIN (HCC): ICD-10-CM

## 2018-12-03 DIAGNOSIS — Z00.00 MEDICARE ANNUAL WELLNESS VISIT, SUBSEQUENT: ICD-10-CM

## 2018-12-03 DIAGNOSIS — M15.9 PRIMARY OSTEOARTHRITIS INVOLVING MULTIPLE JOINTS: ICD-10-CM

## 2018-12-03 LAB
BACTERIA UA POCT, BACTPOCT: NORMAL
BILIRUB UR QL STRIP: NEGATIVE
CASTS UA POCT: NORMAL
CLUE CELLS, CLUEPOCT: NEGATIVE
CRYSTALS UA POCT, CRYSPOCT: NEGATIVE
EPITHELIAL CELLS POCT: NORMAL
GLUCOSE UR-MCNC: NEGATIVE MG/DL
GRAN# POC: 5.8 K/UL (ref 2–7.8)
GRAN% POC: 65.5 % (ref 37–92)
HCT VFR BLD CALC: 40.3 % (ref 37–51)
HGB BLD-MCNC: 13.5 G/DL (ref 12–18)
KETONES P FAST UR STRIP-MCNC: NEGATIVE MG/DL
LY# POC: 2.4 K/UL (ref 0.6–4.1)
LY% POC: 28.3 % (ref 10–58.5)
MCH RBC QN: 31.4 PG (ref 26–32)
MCHC RBC-ENTMCNC: 33.6 G/DL (ref 30–36)
MCV RBC: 93 FL (ref 80–97)
MICROALBUMIN UR TEST STR-MCNC: 100 MG/L (ref 0–20)
MID #, POC: 0.5 K/UL (ref 0–1.8)
MID% POC: 6.2 % (ref 0.1–24)
MUCUS UA POCT, MUCPOCT: NORMAL
PH UR STRIP: 5 [PH] (ref 5–7)
PLATELET # BLD: 259 K/UL (ref 140–440)
PROT UR QL STRIP: NORMAL
RBC # BLD: 4.32 M/UL (ref 4.2–6.3)
RBC UA POCT, RBCPOCT: 0
SP GR UR STRIP: 1.02 (ref 1.01–1.02)
TRICH UA POCT, TRICHPOC: NEGATIVE
UA UROBILINOGEN AMB POC: NORMAL (ref 0.2–1)
URINALYSIS CLARITY POC: CLEAR
URINALYSIS COLOR POC: YELLOW
URINE BLOOD POC: NEGATIVE
URINE CULT COMMENT, POCT: NORMAL
URINE LEUKOCYTES POC: NEGATIVE
URINE NITRITES POC: NEGATIVE
WBC # BLD: 8.7 K/UL (ref 4.1–10.9)
WBC UA POCT, WBCPOCT: NORMAL
YEAST UA POCT, YEASTPOC: NEGATIVE

## 2018-12-03 NOTE — PROGRESS NOTES
This is a Subsequent Medicare Annual Wellness Visit providing Personalized Prevention Plan Services (PPPS) (Performed 12 months after initial AWV and PPPS ) I have reviewed the patient's medical history in detail and updated the computerized patient record. He presents for his Medicare subsequent annual wellness examination screening questionnaire. He is here also in follow-up of his medical problems include hypertension, diabetes, hyperlipidemia, CKD stage III, prostate carcinoma, DJD, obesity and other medical problems. He currently denies any chest pain, shortness of breath, palpitations, PND, orthopnea or cardiorespiratory complaints. He denies any GI or  complaints. He denies any headaches, dizziness or neurologic planes. There are no current arthritic complaints and no other complaints on complete review of systems. History Past Medical History:  
Diagnosis Date  Cancer [facility])   
 prostate  Cardiac arrhythmia 7/20/2017  Colon cancer screening 7/20/2017  Diabetes ([facility] [address] Type II  
 Diabetes ([facility] [address]  DJD (degenerative joint disease) 7/20/2017  Former smoker 7/20/2017  Hypercholesterolemia  Hyperlipidemia 7/20/2017  Hypertension  Hypertension with renal disease 7/20/2017  On statin therapy 7/20/2017  Polymyalgia rheumatica ([facility] [address] 7/20/2017 Past Surgical History:  
Procedure Laterality Date [address]  HX PROSTATECTOMY Social History Tobacco Use  Smoking status: Former Smoker Packs/day: 1.00 Years: 50.00 Pack years: 50.00 Types: Cigarettes Start date: 7/20/1955 Last attempt to quit: 7/20/2008 Years since quitting: 10.3  Smokeless tobacco: Never Used Substance Use Topics  Alcohol use: No  
 Drug use: No  
 
Current Outpatient Medications Medication Sig Dispense Refill  amLODIPine (NORVASC) 5 mg tablet TAKE ONE TABLET BY MOUTH ONCE DAILY 90 Tab 3  
  simvastatin (ZOCOR) 20 mg tablet TAKE ONE TABLET BY MOUTH ONCE DAILY AT  NIGHT 90 Tab 3  
 ezetimibe (ZETIA) 10 mg tablet TAKE ONE TABLET BY MOUTH ONCE DAILY AT BEDTIME 90 Tab 3  
 losartan (COZAAR) 50 mg tablet Take 1 Tab by mouth daily. 90 Tab 3  
 lisinopril (PRINIVIL, ZESTRIL) 40 mg tablet Take 1 Tab by mouth daily. 90 Tab 3  
 metFORMIN ER (GLUCOPHAGE XR) 500 mg tablet Take 2 Tabs by mouth two (2) times a day. 360 Tab 3  
 glimepiride (AMARYL) 4 mg tablet Take 1 Tab by mouth every morning. 90 Tab 3  JANUVIA 100 mg tablet TAKE ONE TABLET BY MOUTH ONCE DAILY 90 Tab 3  
 aspirin delayed-release 81 mg tablet Take  by mouth daily.  Omega-3-DHA-EPA-Fish Oil 1,000 mg (120 mg-180 mg) cap Take 1,200 mg by mouth daily.  nicotinic acid (NIACIN) 500 mg tablet Take 500 mg by mouth Daily (before breakfast). No Known Allergies Family History Problem Relation Age of Onset  Heart Disease Mother  Cancer Father Patient Active Problem List  
 Diagnosis  Severe obesity (BMI 35.0-39. 9) with comorbidity ([address]  Hypertensive kidney disease with chronic kidney disease stage III ([address]  Controlled type 2 diabetes mellitus with stage 3 chronic kidney disease, without long-term current use of insulin ([address]  Mixed hyperlipidemia  Chronic kidney disease, stage 3 (HCC)  Primary osteoarthritis involving multiple joints  Polymyalgia rheumatica ([address]  Medicare annual wellness visit, subsequent  Prostate cancer ([facility] [address]  
  S/P Prostatectomy  Former smoker  Colon cancer screening  Cardiac arrhythmia Patient Care Team: 
Iron West MD as PCP - General (Internal Medicine) Depression Risk Factor Screening: PHQ over the last two weeks 12/3/2018 Little interest or pleasure in doing things Not at all Feeling down, depressed, irritable, or hopeless Not at all Total Score PHQ 2 0 Alcohol Risk Factor Screening: You do not drink alcohol or very rarely. Functional Ability and Level of Safety:  
 
Fall Risk Fall Risk Assessment, last 12 mths 12/3/2018 Able to walk? Yes Fall in past 12 months? No  
 
 
Hearing Loss  
mild Activities of Daily Living Self-care. ADL Assessment 5/16/2018 Feeding yourself No Help Needed Getting from bed to chair No Help Needed Getting dressed No Help Needed Bathing or showering No Help Needed Walk across the room (includes cane/walker) No Help Needed Using the telphone No Help Needed Taking your medications No Help Needed Preparing meals No Help Needed Managing money (expenses/bills) No Help Needed Moderately strenuous housework (laundry) No Help Needed Shopping for personal items (toiletries/medicines) No Help Needed Shopping for groceries No Help Needed Driving No Help Needed Climbing a flight of stairs No Help Needed Getting to places beyond walking distances No Help Needed Abuse Screen Patient is not abused Social History Social History Narrative ** Merged History Encounter ** Review of Systems ROS: 
 
Constitutional: He denies fevers, weight loss, sweats. Eyes: No blurred or double vision. ENT: No difficulty with swallowing, taste, speech or smell. Neck: no stiffness or swelling Respiratory: No cough wheezing or shortness of breath. Cardiovascular: Denies chest pain, palpitations, unexplained indigestion or syncope. Gastrointestinal:  No changes in bowel movements, no abdominal pain, no bloating. Genitourinary:  He denies frequency, nocturia or stranguria. Extremities: No joint pain, stiffness or swelling. Neurological:  No numbness, tingling, burring paresthesias or loss of motor strength. No syncope, dizziness or frequent headache Lymphatic: no adenopathy noted Hematologic: no easy bruising or bleeding gums Skin:  No recent rashes or mole changes. Psychiatric/Behavioral:  Negative for depression. Physical Examination Evaluation of Cognitive Function: 
Mood/affect:  happy Appearance: age appropriate Family member/caregiver input: none Visit Vitals /62 (BP 1 Location: Left arm, BP Patient Position: Sitting) Pulse 69 Resp 15 Ht 5' 7\" (1.702 m) Wt 224 lb 12.8 oz (102 kg) SpO2 96% BMI 35.21 kg/m² Vitals:  
 12/03/18 1304 BP: 120/62 Pulse: 69 Resp: 15 SpO2: 96% Weight: 224 lb 12.8 oz (102 kg) Height: 5' 7\" (1.702 m) PainSc:   0 - No pain PHYSICAL EXAM: 
 
General appearance - alert, well appearing, and in no distress Mental status - alert, oriented to person, place, and time HEENT: 
Ears - bilateral TM's and external ear canals clear Eyes - pupillary responses were normal.  Extraocular muscle function intact. Lids and conjunctiva not injected. Fundoscopic exam revealed sharp disc margins. eye movements intact Pharynx- clear with teeth in good repair. No masses were noted Neck - supple without thyromegaly or burit. No JVD noted Lungs - clear to auscultation and percussion Cardiac- normal rate, regular rhythm without murmurs. PMI not displaced. No gallop, rub or click Abdomen - flat, soft, non-tender without palpable organomegaly or mass. No pulsatile mass was felt, and not bruit was heard. Bowel sounds were active : Circumcised, Testes descended w/o masses Rectal: normal sphincter tone, prostate normal, no masses, stool brown and hemacult negative Extremities -  no clubbing cyanosis or edema Lymphatics - no palpable lymphadenopathy, no hepatosplenomegaly Hematologic: no petechiae or purpura Peripheral vascular -Femoral, Dorsalis pedis and posterior tibial pulses felt without difficulty Skin - no rash or unusual mole change noted Neurological - Cranial nerves II-XII grossly intact. Motor strength 5/5. DTR's 2+ and symmetric.   Station and gait normal 
 Back exam - full range of motion, no tenderness, palpable spasm or pain on motion Musculoskeletal - no joint tenderness, deformity or swelling Results for orders placed or performed in visit on 08/17/18 HEMOGLOBIN A1C WITH EAG Result Value Ref Range Hemoglobin A1c 6.6 (H) 4.8 - 5.6 % Estimated average glucose 143 mg/dL METABOLIC PANEL, COMPREHENSIVE Result Value Ref Range Glucose 104 (H) 65 - 99 mg/dL BUN 20 8 - 27 mg/dL Creatinine 1.73 (H) 0.76 - 1.27 mg/dL GFR est non-AA 36 (L) >59 mL/min/1.73 GFR est AA 41 (L) >59 mL/min/1.73  
 BUN/Creatinine ratio 12 10 - 24 Sodium 142 134 - 144 mmol/L Potassium 5.1 3.5 - 5.2 mmol/L Chloride 104 96 - 106 mmol/L  
 CO2 22 20 - 29 mmol/L Calcium 10.3 (H) 8.6 - 10.2 mg/dL Protein, total 7.4 6.0 - 8.5 g/dL Albumin 4.5 3.5 - 4.7 g/dL GLOBULIN, TOTAL 2.9 1.5 - 4.5 g/dL A-G Ratio 1.6 1.2 - 2.2 Bilirubin, total 1.1 0.0 - 1.2 mg/dL Alk. phosphatase 57 39 - 117 IU/L  
 AST (SGOT) 25 0 - 40 IU/L  
 ALT (SGPT) 23 0 - 44 IU/L  
LIPID PANEL Result Value Ref Range Cholesterol, total 104 100 - 199 mg/dL Triglyceride 127 0 - 149 mg/dL HDL Cholesterol 33 (L) >39 mg/dL VLDL, calculated 25 5 - 40 mg/dL LDL, calculated 46 0 - 99 mg/dL CK Result Value Ref Range Creatine Kinase,Total 112 24 - 204 U/L Advice/Referrals/Counseling Education and counseling provided: 
Are appropriate based on today's review and evaluation End-of-Life planning (with patient's consent) Pneumococcal Vaccine Influenza Vaccine Colorectal cancer screening tests Assessment/Plan ASSESSMENT:  
1. Hypertensive kidney disease with chronic kidney disease stage III ([facility] [address] 2. Mixed hyperlipidemia 3. Controlled type 2 diabetes mellitus with stage 3 chronic kidney disease, without long-term current use of insulin ([facility] [address] 4. Chronic kidney disease, stage 3 (HCC) 5. Primary osteoarthritis involving multiple joints 6. Severe obesity (BMI 35.0-39. 9) with comorbidity ([address] 7. Polymyalgia rheumatica ([facility] [address] 8. Prostate cancer ([facility] [address] 9. Medicare annual wellness visit, subsequent Impression 1. Hypertension that is controlled to continue current therapy reviewed with 2. Hyperlipidemia prior labs reviewed repeat status pending I will adjust if necessary. 3.  Diabetes repeat status pending and prior labs reviewed on make adjustments if necessary. 4.  CKD stage III 5. DJD that is stable 6. Obesity we discussed diet, exercise and weight reduction for overall health benefit. 7.  History of probably myalgia rheumatica currently in remission 8. Prostate cancer PSA pending Medicare subsequent annual wellness examination screening questionnaire is completed today. The results were reviewed with him his questions were answered. Lifestyle recommendations and modifications discussed and made. I will call the lab results and make further recommendations or adjustments if necessary. Follow-up as scheduled for 3 months or sooner should to be a problem. PLAN: 
. Orders Placed This Encounter  T4, FREE  
 METABOLIC PANEL, COMPREHENSIVE  
 TSH 3RD GENERATION  
 LIPID PANEL  
 CK  
 HEMOGLOBIN A1C WITH EAG  
 PROSTATE SPECIFIC AG  
 AMB POC COMPLETE CBC,AUTOMATED ENTER  AMB POC URINALYSIS DIP STICK AUTO W/ MICRO  AMB POC URINE, MICROALBUMIN, SEMIQUANT (1 RESULT) ATTENTION:  
This medical record was transcribed using an electronic medical records system. Although proofread, it may and can contain electronic and spelling errors. Other human spelling and other errors may be present. Corrections may be executed at a later time. Please feel free to contact us for any clarifications as needed. Follow-up Disposition: 
Return in about 3 months (around 3/3/2019). Isabell Phillip MD 
 
Recommended healthy diet low in carbohydrates, fats, sodium and cholesterol. Recommended regular cardiovascular exercise 3-6 times per week for 30-60 minutes daily. Current Outpatient Medications Medication Sig Dispense Refill  amLODIPine (NORVASC) 5 mg tablet TAKE ONE TABLET BY MOUTH ONCE DAILY 90 Tab 3  
 simvastatin (ZOCOR) 20 mg tablet TAKE ONE TABLET BY MOUTH ONCE DAILY AT  NIGHT 90 Tab 3  
 ezetimibe (ZETIA) 10 mg tablet TAKE ONE TABLET BY MOUTH ONCE DAILY AT BEDTIME 90 Tab 3  
 losartan (COZAAR) 50 mg tablet Take 1 Tab by mouth daily. 90 Tab 3  
 lisinopril (PRINIVIL, ZESTRIL) 40 mg tablet Take 1 Tab by mouth daily. 90 Tab 3  
 metFORMIN ER (GLUCOPHAGE XR) 500 mg tablet Take 2 Tabs by mouth two (2) times a day. 360 Tab 3  
 glimepiride (AMARYL) 4 mg tablet Take 1 Tab by mouth every morning. 90 Tab 3  JANUVIA 100 mg tablet TAKE ONE TABLET BY MOUTH ONCE DAILY 90 Tab 3  
 aspirin delayed-release 81 mg tablet Take  by mouth daily.  Omega-3-DHA-EPA-Fish Oil 1,000 mg (120 mg-180 mg) cap Take 1,200 mg by mouth daily.  nicotinic acid (NIACIN) 500 mg tablet Take 500 mg by mouth Daily (before breakfast). No results found for any visits on 12/03/18. Verbal and written instructions (see AVS) provided. Patient expresses understanding of diagnosis and treatment plan.  
 
Anahi Ibanez MD

## 2018-12-03 NOTE — PATIENT INSTRUCTIONS
Arthritis: Care Instructions Your Care Instructions Arthritis, also called osteoarthritis, is a breakdown of the cartilage that cushions your joints. When the cartilage wears down, your bones rub against each other. This causes pain and stiffness. Many people have some arthritis as they age. Arthritis most often affects the joints of the spine, hands, hips, knees, or feet. You can take simple measures to protect your joints, ease your pain, and help you stay active. Follow-up care is a key part of your treatment and safety. Be sure to make and go to all appointments, and call your doctor if you are having problems. It's also a good idea to know your test results and keep a list of the medicines you take. How can you care for yourself at home? · Stay at a healthy weight. Being overweight puts extra strain on your joints. · Talk to your doctor or physical therapist about exercises that will help ease joint pain. ? Stretch. You may enjoy gentle forms of yoga to help keep your joints and muscles flexible. ? Walk instead of jog. Other types of exercise that are less stressful on the joints include riding a bicycle, swimming, luis chi, or water exercise. ? Lift weights. Strong muscles help reduce stress on your joints. Stronger thigh muscles, for example, take some of the stress off of the knees and hips. Learn the right way to lift weights so you do not make joint pain worse. · Take your medicines exactly as prescribed. Call your doctor if you think you are having a problem with your medicine. · Take pain medicines exactly as directed. ? If the doctor gave you a prescription medicine for pain, take it as prescribed. ? If you are not taking a prescription pain medicine, ask your doctor if you can take an over-the-counter medicine. · Use a cane, crutch, walker, or another device if you need help to get around. These can help rest your joints.  You also can use other things to make life easier, such as a higher toilet seat and padded handles on kitchen utensils. · Do not sit in low chairs, which can make it hard to get up. · Put heat or cold on your sore joints as needed. Use whichever helps you most. You also can take turns with hot and cold packs. ? Apply heat 2 or 3 times a day for 20 to 30 minutesusing a heating pad, hot shower, or hot packto relieve pain and stiffness. ? Put ice or a cold pack on your sore joint for 10 to 20 minutes at a time. Put a thin cloth between the ice and your skin. When should you call for help? Call your doctor now or seek immediate medical care if: 
  · You have sudden swelling, warmth, or pain in any joint.  
  · You have joint pain and a fever or rash.  
  · You have such bad pain that you cannot use a joint.  
 Watch closely for changes in your health, and be sure to contact your doctor if: 
  · You have mild joint symptoms that continue even with more than 6 weeks of care at home.  
  · You have stomach pain or other problems with your medicine. Where can you learn more? Go to http://terry-farhan.info/. Enter A928 in the search box to learn more about \"Arthritis: Care Instructions. \" Current as of: June 11, 2018 Content Version: 11.8 © 3038-6850 [employer]. Care instructions adapted under license by [employer] (which disclaims liability or warranty for this information). If you have questions about a medical condition or this instruction, always ask your healthcare professional. [place] [address] any warranty or liability for your use of this information. Diabetes Foot Health: Care Instructions Your Care Instructions When you have diabetes, your feet need extra care and attention. Diabetes can damage the nerve endings and blood vessels in your feet, making you less likely to notice when your feet are injured.  Diabetes also limits your body's ability to fight infection and get blood to areas that need it. If you get a minor foot injury, it could become an ulcer or a serious infection. With good foot care, you can prevent most of these problems. Caring for your feet can be quick and easy. Most of the care can be done when you are bathing or getting ready for bed. Follow-up care is a key part of your treatment and safety. Be sure to make and go to all appointments, and call your doctor if you are having problems. It's also a good idea to know your test results and keep a list of the medicines you take. How can you care for yourself at home? · Keep your blood sugar close to normal by watching what and how much you eat, monitoring blood sugar, taking medicines if prescribed, and getting regular exercise. · Do not smoke. Smoking affects blood flow and can make foot problems worse. If you need help quitting, talk to your doctor about stop-smoking programs and medicines. These can increase your chances of quitting for good. · Eat a diet that is low in fats. High fat intake can cause fat to build up in your blood vessels and decrease blood flow. · Inspect your feet daily for blisters, cuts, cracks, or sores. If you cannot see well, use a mirror or have someone help you. · Take care of your feet: 
? Wash your feet every day. Use warm (not hot) water. Check the water temperature with your wrists or other part of your body, not your feet. ? Dry your feet well. Pat them dry. Do not rub the skin on your feet too hard. Dry well between your toes. If the skin on your feet stays moist, bacteria or a fungus can grow, which can lead to infection. ? Keep your skin soft. Use moisturizing skin cream to keep the skin on your feet soft and prevent calluses and cracks. But do not put the cream between your toes, and stop using any cream that causes a rash. ? Clean underneath your toenails carefully.  Do not use a sharp object to clean underneath your toenails. Use the blunt end of a nail file or other rounded tool. ? Trim and file your toenails straight across to prevent ingrown toenails. Use a nail clipper, not scissors. Use an emery board to smooth the edges. · Change socks daily. Socks without seams are best, because seams often rub the feet. You can find socks for people with diabetes from specialty catalogs. · Look inside your shoes every day for things like gravel or torn linings, which could cause blisters or sores. · Buy shoes that fit well: 
? Look for shoes that have plenty of space around the toes. This helps prevent bunions and blisters. ? Try on shoes while wearing the kind of socks you will usually wear with the shoes. ? Avoid plastic shoes. They may rub your feet and cause blisters. Good shoes should be made of materials that are flexible and breathable, such as leather or cloth. ? Break in new shoes slowly by wearing them for no more than an hour a day for several days. Take extra time to check your feet for red areas, blisters, or other problems after you wear new shoes. · Do not go barefoot. Do not wear sandals, and do not wear shoes with very thin soles. Thin soles are easy to puncture. They also do not protect your feet from hot pavement or cold weather. · Have your doctor check your feet during each visit. If you have a foot problem, see your doctor. Do not try to treat an early foot problem at home. Home remedies or treatments that you can buy without a prescription (such as corn removers) can be harmful. · Always get early treatment for foot problems. A minor irritation can lead to a major problem if not properly cared for early. When should you call for help? Call your doctor now or seek immediate medical care if: 
  · You have a foot sore, an ulcer or break in the skin that is not healing after 4 days, bleeding corns or calluses, or an ingrown toenail.   · You have blue or black areas, which can mean bruising or blood flow problems.  
  · You have peeling skin or tiny blisters between your toes or cracking or oozing of the skin.  
  · You have a fever for more than 24 hours and a foot sore.  
  · You have new numbness or tingling in your feet that does not go away after you move your feet or change positions.  
  · You have unexplained or unusual swelling of the foot or ankle.  
 Watch closely for changes in your health, and be sure to contact your doctor if: 
  · You cannot do proper foot care. Where can you learn more? Go to http://terry-farhan.info/. Enter A739 in the search box to learn more about \"Diabetes Foot Health: Care Instructions. \" Current as of: December 7, 2017 Content Version: 11.8 © 3994-1925 [employer]. Care instructions adapted under license by [employer] (which disclaims liability or warranty for this information). If you have questions about a medical condition or this instruction, always ask your healthcare professional. [address] any warranty or liability for your use of this information.

## 2018-12-03 NOTE — PROGRESS NOTES
Chief Complaint Patient presents with [facility] Annual Wellness Visit 1. Have you been to the ER, urgent care clinic since your last visit? Hospitalized since your last visit? No 
 
2. Have you seen or consulted any other health care providers outside of the [address] [place] [address] since your last visit? Include any pap smears or colon screening. No 
Visit Vitals /62 (BP 1 Location: Left arm, BP Patient Position: Sitting) Pulse 69 Resp 15 Ht 5' 7\" (1.702 m) Wt 224 lb 12.8 oz (102 kg) SpO2 96% BMI 35.21 kg/m²

## 2018-12-04 LAB
ALBUMIN SERPL-MCNC: 4.8 G/DL (ref 3.5–4.7)
ALBUMIN/GLOB SERPL: 1.6 {RATIO} (ref 1.2–2.2)
ALP SERPL-CCNC: 62 IU/L (ref 39–117)
ALT SERPL-CCNC: 37 IU/L (ref 0–44)
AST SERPL-CCNC: 29 IU/L (ref 0–40)
BILIRUB SERPL-MCNC: 1.2 MG/DL (ref 0–1.2)
BUN SERPL-MCNC: 22 MG/DL (ref 8–27)
BUN/CREAT SERPL: 14 (ref 10–24)
CALCIUM SERPL-MCNC: 10 MG/DL (ref 8.6–10.2)
CHLORIDE SERPL-SCNC: 102 MMOL/L (ref 96–106)
CHOLEST SERPL-MCNC: 106 MG/DL (ref 100–199)
CK SERPL-CCNC: 111 U/L (ref 24–204)
CO2 SERPL-SCNC: 21 MMOL/L (ref 20–29)
CREAT SERPL-MCNC: 1.58 MG/DL (ref 0.76–1.27)
EST. AVERAGE GLUCOSE BLD GHB EST-MCNC: 137 MG/DL
GLOBULIN SER CALC-MCNC: 3 G/DL (ref 1.5–4.5)
GLUCOSE SERPL-MCNC: 117 MG/DL (ref 65–99)
HBA1C MFR BLD: 6.4 % (ref 4.8–5.6)
HDLC SERPL-MCNC: 36 MG/DL
LDLC SERPL CALC-MCNC: 47 MG/DL (ref 0–99)
POTASSIUM SERPL-SCNC: 4.6 MMOL/L (ref 3.5–5.2)
PROT SERPL-MCNC: 7.8 G/DL (ref 6–8.5)
PSA SERPL-MCNC: 0.1 NG/ML (ref 0–4)
SODIUM SERPL-SCNC: 141 MMOL/L (ref 134–144)
T4 FREE SERPL-MCNC: 1.43 NG/DL (ref 0.82–1.77)
TRIGL SERPL-MCNC: 117 MG/DL (ref 0–149)
TSH SERPL DL<=0.005 MIU/L-ACNC: 1.71 UIU/ML (ref 0.45–4.5)
VLDLC SERPL CALC-MCNC: 23 MG/DL (ref 5–40)

## 2018-12-04 NOTE — PROGRESS NOTES
Lab is okay except spilling a little protein in the urine although the glycohemoglobin is good so continue to watch diet. The HDL cholesterol is still a little low and increasing aerobic exercise and increasing fiber in diet would help that.

## 2018-12-05 ENCOUNTER — TELEPHONE (OUTPATIENT)
Dept: INTERNAL MEDICINE CLINIC | Age: 83
End: 2018-12-05

## 2018-12-05 NOTE — TELEPHONE ENCOUNTER
----- Message from Raj Perkins MD sent at 12/4/2018  5:58 PM EST -----  Lab is okay except spilling a little protein in the urine although the glycohemoglobin is good so continue to watch diet. The HDL cholesterol is still a little low and increasing aerobic exercise and increasing fiber in diet would help that.

## 2019-02-12 RX ORDER — SITAGLIPTIN 100 MG/1
TABLET, FILM COATED ORAL
Qty: 90 TAB | Refills: 3 | Status: SHIPPED | OUTPATIENT
Start: 2019-02-12

## 2019-02-12 NOTE — TELEPHONE ENCOUNTER
RX refill request from the patient/pharmacy. Patient last seen 12- with labs, and next appt. scheduled for 03-  Requested Prescriptions     Pending Prescriptions Disp Refills    JANUVIA 100 mg tablet [Pharmacy Med Name: Karen Melena 100MG       TAB] 90 Tab 3     Sig: TAKE ONE TABLET BY MOUTH ONCE DAILY   .

## 2019-03-05 NOTE — PROGRESS NOTES
Chief Complaint   Patient presents with    Hypertension     3m f/u    Diabetes    Cholesterol Problem    Chronic Kidney Disease       SUBJECTIVE:    Taryn Weaver is a 80 y.o. male who returns in follow-up of his medical problems include hypertension, diabetes, hyperlipidemia, CKD stage III, DJD, obesity and other medical problems. He is taking his medications and trying to follow his diet and trying to get some exercise. He currently denies any chest pain, shortness of breath, palpitations, PND, orthopnea or other cardiorespiratory complaints. He denies any GI or  complaints. He still having some mild problems with depression but he does not want to take anything for this is all over the loss of his wife but he feels like he is dealing with it okay and feels like he does not need anything for that. He has no suicidal thoughts ideations. Current Outpatient Medications   Medication Sig Dispense Refill    JANUVIA 100 mg tablet TAKE ONE TABLET BY MOUTH ONCE DAILY 90 Tab 3    amLODIPine (NORVASC) 5 mg tablet TAKE ONE TABLET BY MOUTH ONCE DAILY 90 Tab 3    simvastatin (ZOCOR) 20 mg tablet TAKE ONE TABLET BY MOUTH ONCE DAILY AT  NIGHT 90 Tab 3    ezetimibe (ZETIA) 10 mg tablet TAKE ONE TABLET BY MOUTH ONCE DAILY AT BEDTIME 90 Tab 3    losartan (COZAAR) 50 mg tablet Take 1 Tab by mouth daily. 90 Tab 3    lisinopril (PRINIVIL, ZESTRIL) 40 mg tablet Take 1 Tab by mouth daily. 90 Tab 3    metFORMIN ER (GLUCOPHAGE XR) 500 mg tablet Take 2 Tabs by mouth two (2) times a day. 360 Tab 3    glimepiride (AMARYL) 4 mg tablet Take 1 Tab by mouth every morning. 90 Tab 3    aspirin delayed-release 81 mg tablet Take  by mouth daily.  Omega-3-DHA-EPA-Fish Oil 1,000 mg (120 mg-180 mg) cap Take 1,500 mg by mouth daily.  nicotinic acid (NIACIN) 500 mg tablet Take 500 mg by mouth Daily (before breakfast).        Past Medical History:   Diagnosis Date    Cancer [facility])     prostate    Cardiac arrhythmia 7/20/2017    Colon cancer screening 7/20/2017    Diabetes ([address]     Type II    Diabetes ([address]     DJD (degenerative joint disease) 7/20/2017    Former smoker 7/20/2017    Hypercholesterolemia     Hyperlipidemia 7/20/2017    Hypertension     Hypertension with renal disease 7/20/2017    On statin therapy 7/20/2017    Polymyalgia rheumatica ([facility] [address] 7/20/2017     Past Surgical History:   Procedure Laterality Date    HX PROSTATECTOMY      1969    HX PROSTATECTOMY       No Known Allergies    REVIEW OF SYSTEMS:  General: negative for - chills or fever, or weight loss or gain  ENT: negative for - headaches, nasal congestion or tinnitus  Eyes: no blurred or visual changes  Neck: No stiffness or swollen nodes  Respiratory: negative for - cough, hemoptysis, shortness of breath or wheezing  Cardiovascular : negative for - chest pain, edema, palpitations or shortness of breath  Gastrointestinal: negative for - abdominal pain, blood in stools, heartburn or nausea/vomiting  Genito-Urinary: no dysuria, trouble voiding, or hematuria  Musculoskeletal: negative for - gait disturbance, joint pain, joint stiffness or joint swelling  Neurological: no TIA or stroke symptoms  Hematologic: no bruises, no bleeding  Lymphatic: no swollen glands  Psychiatric: Some depression over the loss of his wife without any suicidal thoughts ideations  Integument: no lumps, mole changes, nail changes or rash  Endocrine:no malaise/lethargy poly uria or polydipsia or unexpected weight changes        Social History     Socioeconomic History    Marital status:      Spouse name: Not on file    Number of children: Not on file    Years of education: Not on file    Highest education level: Not on file   Tobacco Use    Smoking status: Former Smoker     Packs/day: 1.00     Years: 50.00     Pack years: 50.00     Types: Cigarettes     Start date: 7/20/1955     Last attempt to quit: 7/20/2008     Years since quitting: 10.6    Smokeless tobacco: Never Used   Substance and Sexual Activity    Alcohol use: No    Drug use: No    Sexual activity: No   Social History Narrative    ** Merged History Encounter **          Family History   Problem Relation Age of Onset    Heart Disease Mother     Cancer Father        OBJECTIVE:     Visit Vitals  /72   Pulse 65   Temp 97.6 °F (36.4 °C) (Oral)   Resp 18   Ht 5' 7\" (1.702 m)   Wt 225 lb 3.2 oz (102.2 kg)   SpO2 96%   BMI 35.27 kg/m²     CONSTITUTIONAL:   well nourished, appears age appropriate  EYES: sclera anicteric, PERRL, EOMI  ENMT:nares clear, moist mucous membranes, pharynx clear  NECK: supple. Thyroid normal, No JVD or bruits  RESPIRATORY: Chest: clear to ascultation and percussion, normal inspiratory effort  CARDIOVASCULAR: Heart: regular rate and rhythm no murmurs, rubs or gallops, PMI not displaced, No thrills  GASTROINTESTINAL: Abdomen: non distended, soft, non tender, bowel sounds normal  HEMATOLOGIC: no purpura, petechiae or bruising  LYMPHATIC: No lymph node enlargemant  MUSCULOSKELETAL: Extremities: no edema or active synovitis, pulse 1+   INTEGUMENT: No unusual rashes or suspicious skin lesions noted. Nails appear normal..  No diabetic foot changes noted. PERIPHERAL VASCULAR: normal pulses femoral, PT and DP  NEUROLOGIC: non-focal exam, A & O X 3. Normal distal sensation and proprioception all toes both feet. PSYCHIATRIC:, appropriate affect     ASSESSMENT:   1. Hypertensive kidney disease with chronic kidney disease stage III ([address]    2. Controlled type 2 diabetes mellitus with stage 3 chronic kidney disease, without long-term current use of insulin ([address]    3. Mixed hyperlipidemia    4. Chronic kidney disease, stage 3 ([address]    5. Primary osteoarthritis involving multiple joints    6. Severe obesity (BMI 35.0-39. 9) with comorbidity ([address]    7. Polymyalgia rheumatica ([address]    8. Prostate cancer ([address]      Impression  1.   Hypertension is controlled so continue current therapy reviewed with him.  It was initially up to once we had him relax it improved. I think the stress probably has something to do with it. 2.  Diabetes repeat status is pending prior labs reviewed no make adjustments if necessary. 3.  Hyperlipidemia prior lab reviewed repeat status pending I will adjust if needed. 4   CKD stage III repeat status is pending  5. DJD that is stable  6. Obesity we did discuss diet, exercise and weight reduction for overall health benefit. 7.  History of polymyalgia rheumatica currently treated in remission  8. Prostate carcinoma status post prostatectomy many years ago and doing well without evidence recurrence  I will call the lab results and make further recommendations or adjustments if necessary. Follow stable continue same and I will recheck him myself again in 3 months or sooner if there is a problem. PLAN:  .  Orders Placed This Encounter    HEMOGLOBIN A1C WITH EAG    METABOLIC PANEL, COMPREHENSIVE ([facility])    LIPID PANEL ([facility])    CK ([facility])         ATTENTION:   This medical record was transcribed using an electronic medical records system. Although proofread, it may and can contain electronic and spelling errors. Other human spelling and other errors may be present. Corrections may be executed at a later time. Please feel free to contact us for any clarifications as needed. Follow-up Disposition:  Return in about 3 months (around 6/6/2019). No results found for any visits on 03/06/19. Dallin Bach MD    The patient verbalized understanding of the problems and plans as explained.

## 2019-03-06 ENCOUNTER — OFFICE VISIT (OUTPATIENT)
Dept: INTERNAL MEDICINE CLINIC | Age: 84
End: 2019-03-06

## 2019-03-06 VITALS
DIASTOLIC BLOOD PRESSURE: 72 MMHG | BODY MASS INDEX: 35.35 KG/M2 | WEIGHT: 225.2 LBS | TEMPERATURE: 97.6 F | OXYGEN SATURATION: 96 % | RESPIRATION RATE: 18 BRPM | HEART RATE: 65 BPM | HEIGHT: 67 IN | SYSTOLIC BLOOD PRESSURE: 134 MMHG

## 2019-03-06 DIAGNOSIS — E11.22 CONTROLLED TYPE 2 DIABETES MELLITUS WITH STAGE 3 CHRONIC KIDNEY DISEASE, WITHOUT LONG-TERM CURRENT USE OF INSULIN (HCC): ICD-10-CM

## 2019-03-06 DIAGNOSIS — E66.01 SEVERE OBESITY (BMI 35.0-39.9) WITH COMORBIDITY (HCC): ICD-10-CM

## 2019-03-06 DIAGNOSIS — N18.30 HYPERTENSIVE KIDNEY DISEASE WITH CHRONIC KIDNEY DISEASE STAGE III (HCC): Primary | ICD-10-CM

## 2019-03-06 DIAGNOSIS — E78.2 MIXED HYPERLIPIDEMIA: ICD-10-CM

## 2019-03-06 DIAGNOSIS — M35.3 POLYMYALGIA RHEUMATICA (HCC): ICD-10-CM

## 2019-03-06 DIAGNOSIS — N18.30 CHRONIC KIDNEY DISEASE, STAGE 3 (HCC): ICD-10-CM

## 2019-03-06 DIAGNOSIS — I12.9 HYPERTENSIVE KIDNEY DISEASE WITH CHRONIC KIDNEY DISEASE STAGE III (HCC): Primary | ICD-10-CM

## 2019-03-06 DIAGNOSIS — N18.30 CONTROLLED TYPE 2 DIABETES MELLITUS WITH STAGE 3 CHRONIC KIDNEY DISEASE, WITHOUT LONG-TERM CURRENT USE OF INSULIN (HCC): ICD-10-CM

## 2019-03-06 DIAGNOSIS — M15.9 PRIMARY OSTEOARTHRITIS INVOLVING MULTIPLE JOINTS: ICD-10-CM

## 2019-03-06 DIAGNOSIS — C61 PROSTATE CANCER (HCC): ICD-10-CM

## 2019-03-06 LAB
A-G RATIO,AGRAT: 1.5 RATIO
ALBUMIN SERPL-MCNC: 4.6 G/DL (ref 3.9–5.4)
ALP SERPL-CCNC: 67 U/L (ref 38–126)
ALT SERPL-CCNC: 30 U/L (ref 9–52)
ANION GAP SERPL CALC-SCNC: 11 MMOL/L
AST SERPL W P-5'-P-CCNC: 33 U/L (ref 14–36)
BILIRUB SERPL-MCNC: 1.5 MG/DL (ref 0.2–1.3)
BUN SERPL-MCNC: 18 MG/DL (ref 9–20)
BUN/CREATININE RATIO,BUCR: 12 RATIO
CALCIUM SERPL-MCNC: 10.7 MG/DL (ref 8.4–10.2)
CHLORIDE SERPL-SCNC: 104 MMOL/L (ref 98–107)
CHOL/HDL RATIO,CHHD: 3 RATIO (ref 0–4)
CHOLEST SERPL-MCNC: 98 MG/DL (ref 0–200)
CK SERPL-CCNC: 114 U/L (ref 30–135)
CO2 SERPL-SCNC: 27 MMOL/L (ref 22–32)
CREAT SERPL-MCNC: 1.5 MG/DL (ref 0.8–1.5)
GLOBULIN,GLOB: 3
GLUCOSE SERPL-MCNC: 86 MG/DL (ref 75–110)
HDLC SERPL-MCNC: 39 MG/DL (ref 35–130)
LDL/HDL RATIO,LDHD: 1 RATIO
LDLC SERPL CALC-MCNC: 29 MG/DL (ref 0–130)
POTASSIUM SERPL-SCNC: 5.1 MMOL/L (ref 3.6–5)
PROT SERPL-MCNC: 7.6 G/DL (ref 6.3–8.2)
SODIUM SERPL-SCNC: 142 MMOL/L (ref 137–145)
TRIGL SERPL-MCNC: 150 MG/DL (ref 0–200)
VLDLC SERPL CALC-MCNC: 30 MG/DL

## 2019-03-06 NOTE — PROGRESS NOTES
Chief Complaint   Patient presents with    Hypertension     3m f/u    Diabetes    Cholesterol Problem    Chronic Kidney Disease     1. Have you been to the ER, urgent care clinic since your last visit? Hospitalized since your last visit? No    2. Have you seen or consulted any other health care providers outside of the [address] [place] [address] since your last visit? Include any pap smears or colon screening. No    B/p elevated, doctor notified.

## 2019-03-06 NOTE — PATIENT INSTRUCTIONS
Body Mass Index: Care Instructions  Your Care Instructions    Body mass index (BMI) can help you see if your weight is raising your risk for health problems. It uses a formula to compare how much you weigh with how tall you are. · A BMI lower than 18.5 is considered underweight. · A BMI between 18.5 and 24.9 is considered healthy. · A BMI between 25 and 29.9 is considered overweight. A BMI of 30 or higher is considered obese. If your BMI is in the normal range, it means that you have a lower risk for weight-related health problems. If your BMI is in the overweight or obese range, you may be at increased risk for weight-related health problems, such as high blood pressure, heart disease, stroke, arthritis or joint pain, and diabetes. If your BMI is in the underweight range, you may be at increased risk for health problems such as fatigue, lower protection (immunity) against illness, muscle loss, bone loss, hair loss, and hormone problems. BMI is just one measure of your risk for weight-related health problems. You may be at higher risk for health problems if you are not active, you eat an unhealthy diet, or you drink too much alcohol or use tobacco products. Follow-up care is a key part of your treatment and safety. Be sure to make and go to all appointments, and call your doctor if you are having problems. It's also a good idea to know your test results and keep a list of the medicines you take. How can you care for yourself at home? · Practice healthy eating habits. This includes eating plenty of fruits, vegetables, whole grains, lean protein, and low-fat dairy. · If your doctor recommends it, get more exercise. Walking is a good choice. Bit by bit, increase the amount you walk every day. Try for at least 30 minutes on most days of the week. · Do not smoke. Smoking can increase your risk for health problems. If you need help quitting, talk to your doctor about stop-smoking programs and medicines. These can increase your chances of quitting for good. · Limit alcohol to 2 drinks a day for men and 1 drink a day for women. Too much alcohol can cause health problems. If you have a BMI higher than 25  · Your doctor may do other tests to check your risk for weight-related health problems. This may include measuring the distance around your waist. A waist measurement of more than 40 inches in men or 35 inches in women can increase the risk of weight-related health problems. · Talk with your doctor about steps you can take to stay healthy or improve your health. You may need to make lifestyle changes to lose weight and stay healthy, such as changing your diet and getting regular exercise. If you have a BMI lower than 18.5  · Your doctor may do other tests to check your risk for health problems. · Talk with your doctor about steps you can take to stay healthy or improve your health. You may need to make lifestyle changes to gain or maintain weight and stay healthy, such as getting more healthy foods in your diet and doing exercises to build muscle. Where can you learn more? Go to http://terry-farhan.info/. Enter S176 in the search box to learn more about \"Body Mass Index: Care Instructions. \"  Current as of: June 25, 2018  Content Version: 11.9  © 7239-6672 [employer]. Care instructions adapted under license by [employer] (which disclaims liability or warranty for this information). If you have questions about a medical condition or this instruction, always ask your healthcare professional. [address] any warranty or liability for your use of this information.

## 2019-03-07 LAB
EST. AVERAGE GLUCOSE BLD GHB EST-MCNC: 128 MG/DL
HBA1C MFR BLD: 6.1 % (ref 4.8–5.6)

## 2019-04-16 RX ORDER — METFORMIN HYDROCHLORIDE 500 MG/1
TABLET, EXTENDED RELEASE ORAL
Qty: 360 TAB | Refills: 3 | Status: SHIPPED | OUTPATIENT
Start: 2019-04-16 | End: 2020-04-27

## 2019-04-16 NOTE — TELEPHONE ENCOUNTER
RX refill request from the patient/pharmacy. Patient last seen 03- with labs, and next appt. scheduled for 06-  Requested Prescriptions     Pending Prescriptions Disp Refills    metFORMIN ER (GLUCOPHAGE XR) 500 mg tablet [Pharmacy Med Name: MetFORMIN ER 500MG  TAB] 360 Tab 3     Sig: TAKE 2 TABLETS BY MOUTH TWICE DAILY   .

## 2019-05-21 DIAGNOSIS — I10 ESSENTIAL HYPERTENSION: ICD-10-CM

## 2019-05-21 RX ORDER — GLIMEPIRIDE 4 MG/1
TABLET ORAL
Qty: 90 TAB | Refills: 3 | Status: SHIPPED | OUTPATIENT
Start: 2019-05-21 | End: 2020-06-02

## 2019-05-21 RX ORDER — LISINOPRIL 40 MG/1
TABLET ORAL
Qty: 90 TAB | Refills: 3 | Status: SHIPPED | OUTPATIENT
Start: 2019-05-21 | End: 2020-06-02

## 2019-05-21 NOTE — TELEPHONE ENCOUNTER
RX refill request from the patient/pharmacy. Patient last seen 03- with labs, and next appt. scheduled for 06-  Requested Prescriptions     Pending Prescriptions Disp Refills    glimepiride (AMARYL) 4 mg tablet [Pharmacy Med Name: GLIMEPIRIDE 4MG     TAB] 90 Tab 3     Sig: TAKE 1 TABLET BY MOUTH IN THE MORNING    lisinopril (PRINIVIL, ZESTRIL) 40 mg tablet [Pharmacy Med Name: LISINOPRIL 40MG     TAB] 90 Tab 3     Sig: TAKE 1 TABLET BY MOUTH ONCE DAILY   .

## 2019-06-11 NOTE — PROGRESS NOTES
Chief Complaint   Patient presents with    Hypertension     3 month follow up    Diabetes       SUBJECTIVE:    Christen Strauss is a 80 y.o. male who returns in follow-up for his medical problems include hypertension, diabetes, hyperlipidemia, DJD, obesity, CKD stage III and other medical problems. In addition to his chronic problems he has an acute problem of a lot of sinus and nasal congestion been going on for several days and not seem to clear up. He does note a purulent greenish-yellow sputum. He notes no fevers or chills no shortness of breath. He denies any chest pain, shortness of breath, palpitations, PND, orthopnea or other cardiorespiratory complaints. He denies any GI or  complaints. He denies any headaches, dizziness or neurologic complaints. He has no change of his chronic arthritic complaints. Current Outpatient Medications   Medication Sig Dispense Refill    azithromycin (ZITHROMAX) 250 mg tablet Take 1 Tab by mouth See Admin Instructions for 5 days. Take 2 tablets the first day, then 1 tablet daily for the next four days. 6 Tab 0    glimepiride (AMARYL) 4 mg tablet TAKE 1 TABLET BY MOUTH IN THE MORNING 90 Tab 3    lisinopril (PRINIVIL, ZESTRIL) 40 mg tablet TAKE 1 TABLET BY MOUTH ONCE DAILY 90 Tab 3    metFORMIN ER (GLUCOPHAGE XR) 500 mg tablet TAKE 2 TABLETS BY MOUTH TWICE DAILY 360 Tab 3    JANUVIA 100 mg tablet TAKE ONE TABLET BY MOUTH ONCE DAILY 90 Tab 3    amLODIPine (NORVASC) 5 mg tablet TAKE ONE TABLET BY MOUTH ONCE DAILY 90 Tab 3    simvastatin (ZOCOR) 20 mg tablet TAKE ONE TABLET BY MOUTH ONCE DAILY AT  NIGHT 90 Tab 3    ezetimibe (ZETIA) 10 mg tablet TAKE ONE TABLET BY MOUTH ONCE DAILY AT BEDTIME 90 Tab 3    losartan (COZAAR) 50 mg tablet Take 1 Tab by mouth daily. 90 Tab 3    aspirin delayed-release 81 mg tablet Take  by mouth daily.  Omega-3-DHA-EPA-Fish Oil 1,000 mg (120 mg-180 mg) cap Take 1,500 mg by mouth daily.       nicotinic acid (NIACIN) 500 mg tablet Take 500 mg by mouth Daily (before breakfast). Past Medical History:   Diagnosis Date    Cancer [facility])     prostate    Cardiac arrhythmia 7/20/2017    Colon cancer screening 7/20/2017    Diabetes ([facility]     Type II    Diabetes ([facility]     DJD (degenerative joint disease) 7/20/2017    Former smoker 7/20/2017    Hypercholesterolemia     Hyperlipidemia 7/20/2017    Hypertension     Hypertension with renal disease 7/20/2017    On statin therapy 7/20/2017    Polymyalgia rheumatica ([facility] [address] 7/20/2017     Past Surgical History:   Procedure Laterality Date    HX PROSTATECTOMY      1969    HX PROSTATECTOMY       No Known Allergies    REVIEW OF SYSTEMS:  General: negative for - chills or fever, or weight loss or gain  ENT: negative for - headaches, or tinnitus.   Positive head and nasal congestion  Eyes: no blurred or visual changes  Neck: No stiffness or swollen nodes  Respiratory: negative for - cough, hemoptysis, shortness of breath or wheezing  Cardiovascular : negative for - chest pain, edema, palpitations or shortness of breath  Gastrointestinal: negative for - abdominal pain, blood in stools, heartburn or nausea/vomiting  Genito-Urinary: no dysuria, trouble voiding, or hematuria  Musculoskeletal: negative for - gait disturbance, change of his chronic joint pain, joint stiffness or joint swelling  Neurological: no TIA or stroke symptoms  Hematologic: no bruises, no bleeding  Lymphatic: no swollen glands  Integument: no lumps, mole changes, nail changes or rash  Endocrine:no malaise/lethargy poly uria or polydipsia or unexpected weight changes        Social History     Socioeconomic History    Marital status:      Spouse name: Not on file    Number of children: Not on file    Years of education: Not on file    Highest education level: Not on file   Tobacco Use    Smoking status: Former Smoker     Packs/day: 1.00     Years: 50.00     Pack years: 50.00     Types: Cigarettes     Start date: 7/20/1955     Last attempt to quit: 7/20/2008     Years since quitting: 10.9    Smokeless tobacco: Never Used   Substance and Sexual Activity    Alcohol use: No    Drug use: No    Sexual activity: Never   Social History Narrative    ** Merged History Encounter **          Family History   Problem Relation Age of Onset    Heart Disease Mother     Cancer Father        OBJECTIVE:     Visit Vitals  /64   Pulse 70   Temp 97.6 °F (36.4 °C) (Oral)   Resp 18   Ht 5' 7\" (1.702 m)   Wt 224 lb 12.8 oz (102 kg)   SpO2 94%   BMI 35.21 kg/m²     CONSTITUTIONAL:   well nourished, appears age appropriate  EYES: sclera anicteric, PERRL, EOMI  ENMT:nares inflamed and edematous, moist mucous membranes, pharynx clear  NECK: supple. Thyroid normal, No JVD or bruits  RESPIRATORY: Chest: clear to ascultation and percussion, normal inspiratory effort  CARDIOVASCULAR: Heart: regular rate and rhythm no murmurs, rubs or gallops, PMI not displaced, No thrills  GASTROINTESTINAL: Abdomen: non distended, soft, non tender, bowel sounds normal  HEMATOLOGIC: no purpura, petechiae or bruising  LYMPHATIC: No lymph node enlargemant  MUSCULOSKELETAL: Extremities: no edema or active synovitis, pulse 1+   INTEGUMENT: No unusual rashes or suspicious skin lesions noted. Nails appear normal.  PERIPHERAL VASCULAR: normal pulses femoral, PT and DP  NEUROLOGIC: non-focal exam, A & O X 3  PSYCHIATRIC:, appropriate affect     ASSESSMENT:   1. Hypertensive kidney disease with chronic kidney disease stage III ([address]    2. Controlled type 2 diabetes mellitus with stage 3 chronic kidney disease, without long-term current use of insulin ([address]    3. Mixed hyperlipidemia    4. Severe obesity (BMI 35.0-39. 9) with comorbidity ([address]    5. Primary osteoarthritis involving multiple joints    6. Chronic kidney disease, stage 3 (HCC)    7. Acute non-recurrent maxillary sinusitis      Impression  1.   Hypertension is controlled continue current therapy reviewed with him.  2.  Diabetes repeat status pending a prior lab review not make adjustments if necessary. 3   Hyperlipidemia prior lab reviewed and repeat status pending I will adjust if needed. 4.  Obesity we discussed diet, exercise and weight reduction for overall health benefit. 5.  DJD that is stable  6. CKD stage III repeat status pending  7. Sinusitis we will treat with Zithromax  I will call with lab and make further recommendations or adjustments if necessary. Follow-up scheduled for 3 months or sooner should to be a problem. PLAN:  .  Orders Placed This Encounter    HEMOGLOBIN A1C WITH EAG    METABOLIC PANEL, COMPREHENSIVE ([facility])    LIPID PANEL ([facility])    CK ([facility])    azithromycin (ZITHROMAX) 250 mg tablet         ATTENTION:   This medical record was transcribed using an electronic medical records system. Although proofread, it may and can contain electronic and spelling errors. Other human spelling and other errors may be present. Corrections may be executed at a later time. Please feel free to contact us for any clarifications as needed. Follow-up and Dispositions    · Return in about 3 months (around 9/12/2019). No results found for any visits on 06/12/19. Michael Nguyen MD    The patient verbalized understanding of the problems and plans as explained.

## 2019-06-12 ENCOUNTER — OFFICE VISIT (OUTPATIENT)
Dept: INTERNAL MEDICINE CLINIC | Age: 84
End: 2019-06-12

## 2019-06-12 VITALS
RESPIRATION RATE: 18 BRPM | TEMPERATURE: 97.6 F | DIASTOLIC BLOOD PRESSURE: 64 MMHG | WEIGHT: 224.8 LBS | HEART RATE: 70 BPM | SYSTOLIC BLOOD PRESSURE: 138 MMHG | BODY MASS INDEX: 35.28 KG/M2 | OXYGEN SATURATION: 94 % | HEIGHT: 67 IN

## 2019-06-12 DIAGNOSIS — N18.30 HYPERTENSIVE KIDNEY DISEASE WITH CHRONIC KIDNEY DISEASE STAGE III (HCC): Primary | ICD-10-CM

## 2019-06-12 DIAGNOSIS — N18.30 CHRONIC KIDNEY DISEASE, STAGE 3 (HCC): ICD-10-CM

## 2019-06-12 DIAGNOSIS — E66.01 SEVERE OBESITY (BMI 35.0-39.9) WITH COMORBIDITY (HCC): ICD-10-CM

## 2019-06-12 DIAGNOSIS — N18.30 CONTROLLED TYPE 2 DIABETES MELLITUS WITH STAGE 3 CHRONIC KIDNEY DISEASE, WITHOUT LONG-TERM CURRENT USE OF INSULIN (HCC): ICD-10-CM

## 2019-06-12 DIAGNOSIS — J01.00 ACUTE NON-RECURRENT MAXILLARY SINUSITIS: ICD-10-CM

## 2019-06-12 DIAGNOSIS — E11.22 CONTROLLED TYPE 2 DIABETES MELLITUS WITH STAGE 3 CHRONIC KIDNEY DISEASE, WITHOUT LONG-TERM CURRENT USE OF INSULIN (HCC): ICD-10-CM

## 2019-06-12 DIAGNOSIS — E78.2 MIXED HYPERLIPIDEMIA: ICD-10-CM

## 2019-06-12 DIAGNOSIS — I12.9 HYPERTENSIVE KIDNEY DISEASE WITH CHRONIC KIDNEY DISEASE STAGE III (HCC): Primary | ICD-10-CM

## 2019-06-12 DIAGNOSIS — M15.9 PRIMARY OSTEOARTHRITIS INVOLVING MULTIPLE JOINTS: ICD-10-CM

## 2019-06-12 LAB
A-G RATIO,AGRAT: 1.8 RATIO
ALBUMIN SERPL-MCNC: 4.9 G/DL (ref 3.9–5.4)
ALP SERPL-CCNC: 71 U/L (ref 38–126)
ALT SERPL-CCNC: 30 U/L (ref 9–52)
ANION GAP SERPL CALC-SCNC: 10 MMOL/L
AST SERPL W P-5'-P-CCNC: 34 U/L (ref 14–36)
BILIRUB SERPL-MCNC: 1.4 MG/DL (ref 0.2–1.3)
BUN SERPL-MCNC: 25 MG/DL (ref 9–20)
BUN/CREATININE RATIO,BUCR: 16 RATIO
CALCIUM SERPL-MCNC: 10.4 MG/DL (ref 8.4–10.2)
CHLORIDE SERPL-SCNC: 106 MMOL/L (ref 98–107)
CHOL/HDL RATIO,CHHD: 3 RATIO (ref 0–4)
CHOLEST SERPL-MCNC: 105 MG/DL (ref 0–200)
CK SERPL-CCNC: 211 U/L (ref 30–135)
CO2 SERPL-SCNC: 25 MMOL/L (ref 22–32)
CREAT SERPL-MCNC: 1.6 MG/DL (ref 0.8–1.5)
GLOBULIN,GLOB: 2.8
GLUCOSE SERPL-MCNC: 112 MG/DL (ref 75–110)
HDLC SERPL-MCNC: 33 MG/DL (ref 35–130)
LDL/HDL RATIO,LDHD: 1 RATIO
LDLC SERPL CALC-MCNC: 43 MG/DL (ref 0–130)
POTASSIUM SERPL-SCNC: 4.6 MMOL/L (ref 3.6–5)
PROT SERPL-MCNC: 7.7 G/DL (ref 6.3–8.2)
SODIUM SERPL-SCNC: 141 MMOL/L (ref 137–145)
TRIGL SERPL-MCNC: 143 MG/DL (ref 0–200)
VLDLC SERPL CALC-MCNC: 29 MG/DL

## 2019-06-12 RX ORDER — AZITHROMYCIN 250 MG/1
250 TABLET, FILM COATED ORAL SEE ADMIN INSTRUCTIONS
Qty: 6 TAB | Refills: 0 | Status: SHIPPED | OUTPATIENT
Start: 2019-06-12 | End: 2019-06-17

## 2019-06-12 NOTE — PROGRESS NOTES
Imelda Maxwell  Identified pt with two pt identifiers(name and ). Chief Complaint   Patient presents with    Hypertension     3 month follow up    Diabetes       1. Have you been to the ER, urgent care clinic since your last visit? Hospitalized since your last visit? No     2. Have you seen or consulted any other health care providers outside of the [address] [place] [address] since your last visit? Include any pap smears or colon screening. No      Health Maintenance Topics with due status: Overdue       Topic Date Due    DTaP/Tdap/Td series 1956    Shingrix Vaccine Age 50> 1985     Health Maintenance Topics with due status: Not Due       Topic Last Completion Date    GLAUCOMA SCREENING Q2Y 2018    EYE EXAM RETINAL OR DILATED 2018    Influenza Age 9 to Adult 2018    MEDICARE YEARLY EXAM 2018    MICROALBUMIN Q1 2018    FOOT EXAM Q1 2019    HEMOGLOBIN A1C Q6M 2019    LIPID PANEL Q1 2019     Health Maintenance Topics with due status: Completed       Topic Last Completion Date    Pneumococcal 65+ years 2011           Medication reconciliation up to date and corrected with patient at this time. Today's provider has been notified of reason for visit, vitals and flowsheets obtained on patients. Reviewed record in preparation for visit, huddled with provider and have obtained necessary documentation.         Wt Readings from Last 3 Encounters:   19 224 lb 12.8 oz (102 kg)   19 225 lb 3.2 oz (102.2 kg)   18 224 lb 12.8 oz (102 kg)     Temp Readings from Last 3 Encounters:   19 97.6 °F (36.4 °C) (Oral)   19 97.6 °F (36.4 °C) (Oral)   18 97.6 °F (36.4 °C) (Oral)     BP Readings from Last 3 Encounters:   19 140/60   19 134/72   18 120/62     Pulse Readings from Last 3 Encounters:   19 70   19 65   18 69     Vitals:    19 0948   BP: 140/60   Pulse: 70   Resp: 18   Temp: 97.6 °F (36.4 °C)   TempSrc: Oral   SpO2: 94%   Weight: 224 lb 12.8 oz (102 kg)   Height: 5' 7\" (1.702 m)   PainSc:   0 - No pain         Learning Assessment:  :     Learning Assessment 7/20/2017   PRIMARY LEARNER Patient   HIGHEST LEVEL OF EDUCATION - PRIMARY LEARNER  2 YEARS OF COLLEGE   CO-LEARNER CAREGIVER No   PRIMARY LANGUAGE ENGLISH   LEARNER PREFERENCE PRIMARY READING   ANSWERED BY self   RELATIONSHIP SELF       Depression Screening:  :     3 most recent PHQ Screens 6/12/2019   Little interest or pleasure in doing things Not at all   Feeling down, depressed, irritable, or hopeless Not at all   Total Score PHQ 2 0       No flowsheet data found. Fall Risk Assessment:  :     Fall Risk Assessment, last 12 mths 6/12/2019   Able to walk? Yes   Fall in past 12 months? No       Abuse Screening:  :     Abuse Screening Questionnaire 6/12/2019 5/16/2018   Do you ever feel afraid of your partner? N N   Are you in a relationship with someone who physically or mentally threatens you? N N   Is it safe for you to go home?  Y Y       ADL Screening:  :     ADL Assessment 6/12/2019   Feeding yourself No Help Needed   Getting from bed to chair No Help Needed   Getting dressed No Help Needed   Bathing or showering No Help Needed   Walk across the room (includes cane/walker) No Help Needed   Using the telphone No Help Needed   Taking your medications No Help Needed   Preparing meals No Help Needed   Managing money (expenses/bills) No Help Needed   Moderately strenuous housework (laundry) No Help Needed   Shopping for personal items (toiletries/medicines) No Help Needed   Shopping for groceries No Help Needed   Driving No Help Needed   Climbing a flight of stairs No Help Needed   Getting to places beyond walking distances No Help Needed

## 2019-06-12 NOTE — PATIENT INSTRUCTIONS
Body Mass Index: Care Instructions Your Care Instructions Body mass index (BMI) can help you see if your weight is raising your risk for health problems. It uses a formula to compare how much you weigh with how tall you are. · A BMI lower than 18.5 is considered underweight. · A BMI between 18.5 and 24.9 is considered healthy. · A BMI between 25 and 29.9 is considered overweight. A BMI of 30 or higher is considered obese. If your BMI is in the normal range, it means that you have a lower risk for weight-related health problems. If your BMI is in the overweight or obese range, you may be at increased risk for weight-related health problems, such as high blood pressure, heart disease, stroke, arthritis or joint pain, and diabetes. If your BMI is in the underweight range, you may be at increased risk for health problems such as fatigue, lower protection (immunity) against illness, muscle loss, bone loss, hair loss, and hormone problems. BMI is just one measure of your risk for weight-related health problems. You may be at higher risk for health problems if you are not active, you eat an unhealthy diet, or you drink too much alcohol or use tobacco products. Follow-up care is a key part of your treatment and safety. Be sure to make and go to all appointments, and call your doctor if you are having problems. It's also a good idea to know your test results and keep a list of the medicines you take. How can you care for yourself at home? · Practice healthy eating habits. This includes eating plenty of fruits, vegetables, whole grains, lean protein, and low-fat dairy. · If your doctor recommends it, get more exercise. Walking is a good choice. Bit by bit, increase the amount you walk every day. Try for at least 30 minutes on most days of the week. · Do not smoke. Smoking can increase your risk for health problems.  If you need help quitting, talk to your doctor about stop-smoking programs and medicines. These can increase your chances of quitting for good. · Limit alcohol to 2 drinks a day for men and 1 drink a day for women. Too much alcohol can cause health problems. If you have a BMI higher than 25 · Your doctor may do other tests to check your risk for weight-related health problems. This may include measuring the distance around your waist. A waist measurement of more than 40 inches in men or 35 inches in women can increase the risk of weight-related health problems. · Talk with your doctor about steps you can take to stay healthy or improve your health. You may need to make lifestyle changes to lose weight and stay healthy, such as changing your diet and getting regular exercise. If you have a BMI lower than 18.5 · Your doctor may do other tests to check your risk for health problems. · Talk with your doctor about steps you can take to stay healthy or improve your health. You may need to make lifestyle changes to gain or maintain weight and stay healthy, such as getting more healthy foods in your diet and doing exercises to build muscle. Where can you learn more? Go to http://terry-farhan.info/. Enter S176 in the search box to learn more about \"Body Mass Index: Care Instructions. \" Current as of: June 25, 2018 Content Version: 11.9 © 9140-7019 [employer]. Care instructions adapted under license by [employer] (which disclaims liability or warranty for this information). If you have questions about a medical condition or this instruction, always ask your healthcare professional. [address] any warranty or liability for your use of this information.

## 2019-06-13 LAB
EST. AVERAGE GLUCOSE BLD GHB EST-MCNC: 134 MG/DL
HBA1C MFR BLD: 6.3 % (ref 4.8–5.6)

## 2019-08-20 DIAGNOSIS — N18.30 HYPERTENSIVE KIDNEY DISEASE WITH CHRONIC KIDNEY DISEASE STAGE III (HCC): Primary | ICD-10-CM

## 2019-08-20 DIAGNOSIS — I12.9 HYPERTENSIVE KIDNEY DISEASE WITH CHRONIC KIDNEY DISEASE STAGE III (HCC): Primary | ICD-10-CM

## 2019-08-20 RX ORDER — LOSARTAN POTASSIUM 50 MG/1
TABLET ORAL
Qty: 90 TAB | Refills: 3 | Status: SHIPPED | OUTPATIENT
Start: 2019-08-20 | End: 2020-09-03

## 2019-08-20 NOTE — TELEPHONE ENCOUNTER
PCP: Nick Duncan MD    Last appt: 6/12/2019  Future Appointments   Date Time Provider Laura Lorenzana   9/12/2019  9:10 AM Nick Duncan MD [facility] SCHED       Requested Prescriptions     Pending Prescriptions Disp Refills    losartan (COZAAR) 50 mg tablet [Pharmacy Med Name: LOSARTAN 50MG TAB] 90 Tab 3     Sig: TAKE 1 TABLET BY MOUTH ONCE DAILY

## 2019-09-11 NOTE — PROGRESS NOTES
Chief Complaint   Patient presents with    Diabetes    Cholesterol Problem    Chronic Kidney Disease       SUBJECTIVE:    Rosetta Naik is a 80 y.o. male who returns in follow-up of his medical problems include hypertension, diabetes, hyperlipidemia, CKD, DJD, obesity and other medical problems. He is taking his medications and trying to follow his diet and trying to remain physically active. He denies any chest pain, shortness of breath, palpitations, PND, orthopnea or other cardiorespiratory complaints. He notes no GI or  complaints. He notes no headaches, dizziness or neurologic complaints. There are no current arthritic complaints and there are no other complaints on complete review of systems. Current Outpatient Medications   Medication Sig Dispense Refill    losartan (COZAAR) 50 mg tablet TAKE 1 TABLET BY MOUTH ONCE DAILY 90 Tab 3    glimepiride (AMARYL) 4 mg tablet TAKE 1 TABLET BY MOUTH IN THE MORNING 90 Tab 3    lisinopril (PRINIVIL, ZESTRIL) 40 mg tablet TAKE 1 TABLET BY MOUTH ONCE DAILY 90 Tab 3    metFORMIN ER (GLUCOPHAGE XR) 500 mg tablet TAKE 2 TABLETS BY MOUTH TWICE DAILY 360 Tab 3    JANUVIA 100 mg tablet TAKE ONE TABLET BY MOUTH ONCE DAILY 90 Tab 3    amLODIPine (NORVASC) 5 mg tablet TAKE ONE TABLET BY MOUTH ONCE DAILY 90 Tab 3    simvastatin (ZOCOR) 20 mg tablet TAKE ONE TABLET BY MOUTH ONCE DAILY AT  NIGHT 90 Tab 3    ezetimibe (ZETIA) 10 mg tablet TAKE ONE TABLET BY MOUTH ONCE DAILY AT BEDTIME 90 Tab 3    aspirin delayed-release 81 mg tablet Take  by mouth daily.  Omega-3-DHA-EPA-Fish Oil 1,000 mg (120 mg-180 mg) cap Take 1,500 mg by mouth daily.  nicotinic acid (NIACIN) 500 mg tablet Take 500 mg by mouth Daily (before breakfast).        Past Medical History:   Diagnosis Date    Cancer [facility])     prostate    Cardiac arrhythmia 7/20/2017    Colon cancer screening 7/20/2017    Diabetes ([facility] [address]     Type II    Diabetes ([facility] [facility]     DJD (degenerative joint disease) 2017    Former smoker 2017    Hypercholesterolemia     Hyperlipidemia 2017    Hypertension     Hypertension with renal disease 2017    On statin therapy 2017    Polymyalgia rhe[facility]a ([facility] 2017     Past Surgical History:   Procedure Laterality Date    HX PROSTATECTOMY      1969    HX PROSTATECTOMY       No Known Allergies    REVIEW OF SYSTEMS:  General: negative for - chills or fever, or weight loss or gain  ENT: negative for - headaches, nasal congestion or tinnitus  Eyes: no blurred or visual changes  Neck: No stiffness or swollen nodes  Respiratory: negative for - cough, hemoptysis, shortness of breath or wheezing  Cardiovascular : negative for - chest pain, edema, palpitations or shortness of breath  Gastrointestinal: negative for - abdominal pain, blood in stools, heartburn or nausea/vomiting  Genito-Urinary: no dysuria, trouble voiding, or hematuria  Musculoskeletal: negative for - gait disturbance, joint pain, joint stiffness or joint swelling  Neurological: no TIA or stroke symptoms  Hematologic: no bruises, no bleeding  Lymphatic: no swollen glands  Integument: no lumps, mole changes, nail changes or rash  Endocrine:no malaise/lethargy poly uria or polydipsia or unexpected weight changes        Social History     Socioeconomic History    Marital status:      Spouse name: Not on file    Number of children: Not on file    Years of education: Not on file    Highest education level: Not on file   Tobacco Use    Smoking status: Former Smoker     Packs/day: 1.00     Years: 50.00     Pack years: 50.00     Types: Cigarettes     Start date: 1955     Last attempt to quit: 2008     Years since quittin.1    Smokeless tobacco: Never Used   Substance and Sexual Activity    Alcohol use: No    Drug use: No    Sexual activity: Never   Social History Narrative    ** Merged History Encounter **          Family History   Problem Relation Age of Onset    Heart Disease Mother     Cancer Father        OBJECTIVE:     Visit Vitals  /63 (BP 1 Location: Left arm, BP Patient Position: Sitting)   Pulse 66   Temp 97.5 °F (36.4 °C) (Oral)   Resp 16   Ht 5' 7\" (1.702 m)   Wt 225 lb (102.1 kg)   SpO2 95%   BMI 35.24 kg/m²     CONSTITUTIONAL:   well nourished, appears age appropriate  EYES: sclera anicteric, PERRL, EOMI  ENMT:nares clear, moist mucous membranes, pharynx clear  NECK: supple. Thyroid normal, No JVD or bruits  RESPIRATORY: Chest: clear to ascultation and percussion, normal inspiratory effort  CARDIOVASCULAR: Heart: regular rate and rhythm no murmurs, rubs or gallops, PMI not displaced, No thrills  GASTROINTESTINAL: Abdomen: non distended, soft, non tender, bowel sounds normal  HEMATOLOGIC: no purpura, petechiae or bruising  LYMPHATIC: No lymph node enlargemant  MUSCULOSKELETAL: Extremities: no edema or active synovitis, pulse 1+   INTEGUMENT: No unusual rashes or suspicious skin lesions noted. Nails appear normal.  PERIPHERAL VASCULAR: normal pulses femoral, PT and DP  NEUROLOGIC: non-focal exam, A & O X 3  PSYCHIATRIC:, appropriate affect     ASSESSMENT:   1. Hypertensive kidney disease with chronic kidney disease stage III ([address]    2. Controlled type 2 diabetes mellitus with stage 3 chronic kidney disease, without long-term current use of insulin ([address]    3. Mixed hyperlipidemia    4. Chronic kidney disease, stage 3 ([address]    5. Primary osteoarthritis involving multiple joints    6. Polymyalgia rheumatica ([address]    7. Severe obesity (BMI 35.0-39. 9) with comorbidity ([address]      Impression  1. Hypertension that is controlled so continue current therapy reviewed with him. 2.  Diabetes repeat status pending and prior lab reviewed and I will make adjustments if necessary. 3.  Hyperlipidemia prior lab reviewed and repeat status pending and I will adjust if needed. Next #4 CKD stage III repeat status pending  5. DJD that is stable  6.   Polymyalgia rheumatica prior no evidence recurrence  7. Obesity we did discuss diet, exercise and weight reduction for overall health benefit. I will call with lab results and make further recommendations or adjustments if necessary. Follow-up scheduled for 3 months or sooner if there is a problem. PLAN:  .  Orders Placed This Encounter    HEMOGLOBIN A1C WITH EAG    METABOLIC PANEL, COMPREHENSIVE ([facility])    LIPID PANEL ([facility])    CK ([facility])         ATTENTION:   This medical record was transcribed using an electronic medical records system. Although proofread, it may and can contain electronic and spelling errors. Other human spelling and other errors may be present. Corrections may be executed at a later time. Please feel free to contact us for any clarifications as needed. Follow-up and Dispositions    · Return in about 3 months (around 12/12/2019). No results found for any visits on 09/12/19. Preeti Rasmussen MD    The patient verbalized understanding of the problems and plans as explained.

## 2019-09-12 ENCOUNTER — OFFICE VISIT (OUTPATIENT)
Dept: INTERNAL MEDICINE CLINIC | Age: 84
End: 2019-09-12

## 2019-09-12 VITALS
BODY MASS INDEX: 35.31 KG/M2 | SYSTOLIC BLOOD PRESSURE: 130 MMHG | HEART RATE: 66 BPM | HEIGHT: 67 IN | DIASTOLIC BLOOD PRESSURE: 63 MMHG | OXYGEN SATURATION: 95 % | WEIGHT: 225 LBS | TEMPERATURE: 97.5 F | RESPIRATION RATE: 16 BRPM

## 2019-09-12 DIAGNOSIS — M15.9 PRIMARY OSTEOARTHRITIS INVOLVING MULTIPLE JOINTS: ICD-10-CM

## 2019-09-12 DIAGNOSIS — N18.30 CHRONIC KIDNEY DISEASE, STAGE 3 (HCC): ICD-10-CM

## 2019-09-12 DIAGNOSIS — N18.30 CONTROLLED TYPE 2 DIABETES MELLITUS WITH STAGE 3 CHRONIC KIDNEY DISEASE, WITHOUT LONG-TERM CURRENT USE OF INSULIN (HCC): ICD-10-CM

## 2019-09-12 DIAGNOSIS — E66.01 SEVERE OBESITY (BMI 35.0-39.9) WITH COMORBIDITY (HCC): ICD-10-CM

## 2019-09-12 DIAGNOSIS — I12.9 HYPERTENSIVE KIDNEY DISEASE WITH CHRONIC KIDNEY DISEASE STAGE III (HCC): Primary | ICD-10-CM

## 2019-09-12 DIAGNOSIS — E11.22 CONTROLLED TYPE 2 DIABETES MELLITUS WITH STAGE 3 CHRONIC KIDNEY DISEASE, WITHOUT LONG-TERM CURRENT USE OF INSULIN (HCC): ICD-10-CM

## 2019-09-12 DIAGNOSIS — N18.30 HYPERTENSIVE KIDNEY DISEASE WITH CHRONIC KIDNEY DISEASE STAGE III (HCC): Primary | ICD-10-CM

## 2019-09-12 DIAGNOSIS — E78.2 MIXED HYPERLIPIDEMIA: ICD-10-CM

## 2019-09-12 DIAGNOSIS — M35.3 POLYMYALGIA RHEUMATICA (HCC): ICD-10-CM

## 2019-09-12 LAB
A-G RATIO,AGRAT: 1.5 RATIO
ALBUMIN SERPL-MCNC: 4.5 G/DL (ref 3.9–5.4)
ALP SERPL-CCNC: 63 U/L (ref 38–126)
ALT SERPL-CCNC: 21 U/L (ref 0–50)
ANION GAP SERPL CALC-SCNC: 12 MMOL/L
AST SERPL W P-5'-P-CCNC: 29 U/L (ref 14–36)
BILIRUB SERPL-MCNC: 1.4 MG/DL (ref 0.2–1.3)
BUN SERPL-MCNC: 29 MG/DL (ref 9–20)
BUN/CREATININE RATIO,BUCR: 17 RATIO
CALCIUM SERPL-MCNC: 10.6 MG/DL (ref 8.4–10.2)
CHLORIDE SERPL-SCNC: 109 MMOL/L (ref 98–107)
CHOL/HDL RATIO,CHHD: 3 RATIO (ref 0–4)
CHOLEST SERPL-MCNC: 101 MG/DL (ref 0–200)
CK SERPL-CCNC: 121 U/L (ref 30–135)
CO2 SERPL-SCNC: 25 MMOL/L (ref 22–32)
CREAT SERPL-MCNC: 1.7 MG/DL (ref 0.8–1.5)
GLOBULIN,GLOB: 3.1
GLUCOSE SERPL-MCNC: 85 MG/DL (ref 75–110)
HDLC SERPL-MCNC: 39 MG/DL (ref 35–130)
LDL/HDL RATIO,LDHD: 1 RATIO
LDLC SERPL CALC-MCNC: 40 MG/DL (ref 0–130)
POTASSIUM SERPL-SCNC: 5.6 MMOL/L (ref 3.6–5)
PROT SERPL-MCNC: 7.6 G/DL (ref 6.3–8.2)
SODIUM SERPL-SCNC: 146 MMOL/L (ref 137–145)
TRIGL SERPL-MCNC: 110 MG/DL (ref 0–200)
VLDLC SERPL CALC-MCNC: 22 MG/DL

## 2019-09-12 NOTE — PATIENT INSTRUCTIONS
Arthritis: Care Instructions  Your Care Instructions  Arthritis, also called osteoarthritis, is a breakdown of the cartilage that cushions your joints. When the cartilage wears down, your bones rub against each other. This causes pain and stiffness. Many people have some arthritis as they age. Arthritis most often affects the joints of the spine, hands, hips, knees, or feet. You can take simple measures to protect your joints, ease your pain, and help you stay active. Follow-up care is a key part of your treatment and safety. Be sure to make and go to all appointments, and call your doctor if you are having problems. It's also a good idea to know your test results and keep a list of the medicines you take. How can you care for yourself at home? · Stay at a healthy weight. Being overweight puts extra strain on your joints. · Talk to your doctor or physical therapist about exercises that will help ease joint pain. ? Stretch. You may enjoy gentle forms of yoga to help keep your joints and muscles flexible. ? Walk instead of jog. Other types of exercise that are less stressful on the joints include riding a bicycle, swimming, luis chi, or water exercise. ? Lift weights. Strong muscles help reduce stress on your joints. Stronger thigh muscles, for example, take some of the stress off of the knees and hips. Learn the right way to lift weights so you do not make joint pain worse. · Take your medicines exactly as prescribed. Call your doctor if you think you are having a problem with your medicine. · Take pain medicines exactly as directed. ? If the doctor gave you a prescription medicine for pain, take it as prescribed. ? If you are not taking a prescription pain medicine, ask your doctor if you can take an over-the-counter medicine. · Use a cane, crutch, walker, or another device if you need help to get around. These can help rest your joints.  You also can use other things to make life easier, such as a higher toilet seat and padded handles on kitchen utensils. · Do not sit in low chairs, which can make it hard to get up. · Put heat or cold on your sore joints as needed. Use whichever helps you most. You also can take turns with hot and cold packs. ? Apply heat 2 or 3 times a day for 20 to 30 minutesusing a heating pad, hot shower, or hot packto relieve pain and stiffness. ? Put ice or a cold pack on your sore joint for 10 to 20 minutes at a time. Put a thin cloth between the ice and your skin. When should you call for help? Call your doctor now or seek immediate medical care if:    · You have sudden swelling, warmth, or pain in any joint.     · You have joint pain and a fever or rash.     · You have such bad pain that you cannot use a joint.    Watch closely for changes in your health, and be sure to contact your doctor if:    · You have mild joint symptoms that continue even with more than 6 weeks of care at home.     · You have stomach pain or other problems with your medicine. Where can you learn more? Go to http://terry-farhan.info/. Enter R057 in the search box to learn more about \"Arthritis: Care Instructions. \"  Current as of: April 1, 2019  Content Version: 12.1  © 8173-0483 [employer], Incorporated. Care instructions adapted under license by [employer] (which disclaims liability or warranty for this information). If you have questions about a medical condition or this instruction, always ask your healthcare professional. [place] [address] any warranty or liability for your use of this information.

## 2019-09-12 NOTE — PROGRESS NOTES
1. Have you been to the ER, urgent care clinic since your last visit? Hospitalized since your last visit? No    2. Have you seen or consulted any other health care providers outside of the [address] [place] [address] since your last visit? Include any pap smears or colon screening.  No     Chief Complaint   Patient presents with    Diabetes    Cholesterol Problem    Chronic Kidney Disease       Visit Vitals  /63 (BP 1 Location: Left arm, BP Patient Position: Sitting)   Pulse 66   Temp 97.5 °F (36.4 °C) (Oral)   Resp 16   Ht 5' 7\" (1.702 m)   Wt 225 lb (102.1 kg)   SpO2 95%   BMI 35.24 kg/m²

## 2019-09-13 LAB
EST. AVERAGE GLUCOSE BLD GHB EST-MCNC: 128 MG/DL
HBA1C MFR BLD: 6.1 % (ref 4.8–5.6)

## 2019-09-23 PROBLEM — Z00.00 MEDICARE ANNUAL WELLNESS VISIT, SUBSEQUENT: Status: RESOLVED | Noted: 2017-10-27 | Resolved: 2019-09-23

## 2019-09-25 PROBLEM — Z12.11 COLON CANCER SCREENING: Status: RESOLVED | Noted: 2017-07-20 | Resolved: 2019-09-25

## 2019-11-07 ENCOUNTER — CLINICAL SUPPORT (OUTPATIENT)
Dept: INTERNAL MEDICINE CLINIC | Age: 84
End: 2019-11-07

## 2019-11-07 VITALS
WEIGHT: 225.8 LBS | BODY MASS INDEX: 35.44 KG/M2 | RESPIRATION RATE: 19 BRPM | HEIGHT: 67 IN | OXYGEN SATURATION: 96 % | DIASTOLIC BLOOD PRESSURE: 72 MMHG | SYSTOLIC BLOOD PRESSURE: 145 MMHG | TEMPERATURE: 97.8 F | HEART RATE: 64 BPM

## 2019-11-07 DIAGNOSIS — Z23 ENCOUNTER FOR IMMUNIZATION: Primary | ICD-10-CM

## 2019-11-07 NOTE — PROGRESS NOTES
After obtaining consent and per verbal order from Dr. Emanuel Gonzales, patient received influenza vaccine given by [facility]. Fluad Influenza 0.5ml was given IM in left deltoid. Patient tolerated injection and was observed for 10 minutes post injection. VIS was given.

## 2019-11-19 DIAGNOSIS — E78.2 MIXED HYPERLIPIDEMIA: ICD-10-CM

## 2019-11-19 DIAGNOSIS — E78.5 HYPERLIPIDEMIA, UNSPECIFIED HYPERLIPIDEMIA TYPE: ICD-10-CM

## 2019-11-19 RX ORDER — AMLODIPINE BESYLATE 5 MG/1
TABLET ORAL
Qty: 90 TAB | Refills: 3 | Status: SHIPPED | OUTPATIENT
Start: 2019-11-19 | End: 2020-12-02

## 2019-11-19 RX ORDER — SIMVASTATIN 20 MG/1
TABLET, FILM COATED ORAL
Qty: 90 TAB | Refills: 3 | Status: SHIPPED | OUTPATIENT
Start: 2019-11-19 | End: 2020-12-02

## 2019-11-19 RX ORDER — EZETIMIBE 10 MG/1
TABLET ORAL
Qty: 90 TAB | Refills: 3 | Status: SHIPPED | OUTPATIENT
Start: 2019-11-19 | End: 2020-12-02

## 2019-11-19 NOTE — TELEPHONE ENCOUNTER
RX refill request from the patient/pharmacy. Patient last seen 09- with labs, and next appt. scheduled for 12-  Requested Prescriptions     Pending Prescriptions Disp Refills    ezetimibe (ZETIA) 10 mg tablet [Pharmacy Med Name: EZETIMIBE 10MG TAB] 90 Tab 3     Sig: TAKE 1 TABLET BY MOUTH ONCE DAILY AT BEDTIME    amLODIPine (NORVASC) 5 mg tablet [Pharmacy Med Name: AMLODIPINE 5MG TAB] 90 Tab 3     Sig: TAKE 1 TABLET BY MOUTH ONCE DAILY    simvastatin (ZOCOR) 20 mg tablet [Pharmacy Med Name: SIMVASTATIN 20MG  TAB] 90 Tab 3     Sig: TAKE 1 TABLET BY MOUTH ONCE DAILY AT  NIGHT   .

## 2019-12-16 PROBLEM — Z13.39 ALCOHOL SCREENING: Status: ACTIVE | Noted: 2019-12-16

## 2019-12-16 NOTE — PROGRESS NOTES
This is a Subsequent Medicare Annual Wellness Visit providing Personalized Prevention Plan Services (PPPS) (Performed 12 months after initial AWV and PPPS )    I have reviewed the patient's medical history in detail and updated the computerized patient record. He returns today for his Medicare subsequent annual wellness exam and screening questionnaire. He is also in follow-up of his multi-medical problems include hypertension, diabetes, hyperlipidemia, DJD, obesity, CKD stage III, prostate cancer, history of polymyalgia rheumatica and other medical problems. He is taking his medications and trying to follow his diet and tries remain physically active. He currently denies any chest pain, shortness of breath, palpitations, PND, orthopnea or other cardiorespiratory complaints. He notes no GI or  complaints. He notes no headaches, dizziness or neurologic complaints. He has no change of his chronic arthritic complaints and there are no other complaints on complete review of systems.     History     Past Medical History:   Diagnosis Date    Cancer [facility])     prostate    Cardiac arrhythmia 2017    Colon cancer screening 2017    Diabetes [facility]     Type II    Diabetes [facility]     DJD (degenerative joint disease) 2017    Former smoker 2017    Hypercholesterolemia     Hyperlipidemia 2017    Hypertension     Hypertension with renal disease 2017    On statin therapy 2017    Polymyalgia rheumatica ([facility] 2017      Past Surgical History:   Procedure Laterality Date    HX PROSTATECTOMY          HX PROSTATECTOMY       Social History     Tobacco Use    Smoking status: Former Smoker     Packs/day: 1.00     Years: 50.00     Pack years: 50.00     Types: Cigarettes     Start date: 1955     Last attempt to quit: 2008     Years since quittin.4    Smokeless tobacco: Never Used   Substance Use Topics    Alcohol use: No    Drug use: No     Current Outpatient Medications   Medication Sig Dispense Refill    ezetimibe (ZETIA) 10 mg tablet TAKE 1 TABLET BY MOUTH ONCE DAILY AT BEDTIME 90 Tab 3    amLODIPine (NORVASC) 5 mg tablet TAKE 1 TABLET BY MOUTH ONCE DAILY 90 Tab 3    simvastatin (ZOCOR) 20 mg tablet TAKE 1 TABLET BY MOUTH ONCE DAILY AT  NIGHT 90 Tab 3    losartan (COZAAR) 50 mg tablet TAKE 1 TABLET BY MOUTH ONCE DAILY 90 Tab 3    glimepiride (AMARYL) 4 mg tablet TAKE 1 TABLET BY MOUTH IN THE MORNING 90 Tab 3    lisinopril (PRINIVIL, ZESTRIL) 40 mg tablet TAKE 1 TABLET BY MOUTH ONCE DAILY 90 Tab 3    metFORMIN ER (GLUCOPHAGE XR) 500 mg tablet TAKE 2 TABLETS BY MOUTH TWICE DAILY 360 Tab 3    JANUVIA 100 mg tablet TAKE ONE TABLET BY MOUTH ONCE DAILY 90 Tab 3    aspirin delayed-release 81 mg tablet Take  by mouth daily.  Omega-3-DHA-EPA-Fish Oil 1,000 mg (120 mg-180 mg) cap Take 1,500 mg by mouth daily.  nicotinic acid (NIACIN) 500 mg tablet Take 500 mg by mouth Daily (before breakfast). No Known Allergies  Family History   Problem Relation Age of Onset    Heart Disease Mother     Cancer Father        Patient Active Problem List    Diagnosis    Severe obesity (BMI 35.0-39. 9) with comorbidity ([facility]    Hypertensive kidney disease with chronic kidney disease stage III (HCC)    Controlled type 2 diabetes mellitus with stage 3 chronic kidney disease, without long-term current use of insulin (HCC)    Mixed hyperlipidemia    Chronic kidney disease, stage 3 (HCC)    Primary osteoarthritis involving multiple joints    Polymyalgia rheumatica (HCC)    Alcohol screening    Acute non-recurrent maxillary sinusitis    Medicare annual wellness visit, subsequent    Prostate cancer ([facility]     S/P Prostatectomy        Former smoker    Cardiac arrhythmia       Patient Care Team:  Tio Humphries MD as PCP - General (Internal Medicine)  Tio Humphries MD as PCP - [facility] Provider    Depression Risk Factor Screening:     3 most recent PHQ Screens 12/17/2019   Little interest or pleasure in doing things Not at all   Feeling down, depressed, irritable, or hopeless Not at all   Total Score PHQ 2 0     Alcohol Risk Factor Screening:     Alcohol Risk Factor Screening:   Do you average 1 drink per night or more than 7 drinks a week:  No    On any one occasion in the past three months have you have had more than 3 drinks containing alcohol:  No    Functional Ability and Level of Safety:     Fall Risk     Fall Risk Assessment, last 12 mths 12/17/2019   Able to walk? Yes   Fall in past 12 months? No       Hearing Loss   mild    Activities of Daily Living   Self-care. ADL Assessment 6/12/2019   Feeding yourself No Help Needed   Getting from bed to chair No Help Needed   Getting dressed No Help Needed   Bathing or showering No Help Needed   Walk across the room (includes cane/walker) No Help Needed   Using the telphone No Help Needed   Taking your medications No Help Needed   Preparing meals No Help Needed   Managing money (expenses/bills) No Help Needed   Moderately strenuous housework (laundry) No Help Needed   Shopping for personal items (toiletries/medicines) No Help Needed   Shopping for groceries No Help Needed   Driving No Help Needed   Climbing a flight of stairs No Help Needed   Getting to places beyond walking distances No Help Needed       Abuse Screen   Patient is not abused    Social History     Patient does not qualify to have social determinant information on file (likely too young). Social History Narrative    ** Merged History Encounter **            Review of Systems      ROS:    Constitutional: He denies fevers, weight loss, sweats. Eyes: No blurred or double vision. ENT: No difficulty with swallowing, taste, speech or smell. Neck: no stiffness or swelling  Respiratory: No cough wheezing or shortness of breath. Cardiovascular: Denies chest pain, palpitations, unexplained indigestion or syncope.   Gastrointestinal:  No changes in bowel movements, no abdominal pain, no bloating. Genitourinary:  He denies frequency, nocturia or stranguria. Extremities: No joint pain, stiffness or swelling. Neurological:  No numbness, tingling, burring paresthesias or loss of motor strength. No syncope, dizziness or frequent headache  Lymphatic: no adenopathy noted  Hematologic: no easy bruising or bleeding gums  Skin:  No recent rashes or mole changes. Psychiatric/Behavioral:  Negative for depression. Physical Examination     Evaluation of Cognitive Function:  Mood/affect:  happy  Appearance: age appropriate  Family member/caregiver input: none    Visit Vitals  /74   Pulse 66   Temp 97.6 °F (36.4 °C) (Oral)   Resp 16   Ht 5' 7\" (1.702 m)   Wt 223 lb (101.2 kg)   SpO2 94%   BMI 34.93 kg/m²     Vitals:    12/17/19 1002 12/17/19 1033   BP: 149/70 138/74   Pulse: 66    Resp: 16    Temp: 97.6 °F (36.4 °C)    TempSrc: Oral    SpO2: 94%    Weight: 223 lb (101.2 kg)    Height: 5' 7\" (1.702 m)    PainSc:   0 - No pain         PHYSICAL EXAM:    General appearance - alert, well appearing, and in no distress  Mental status - alert, oriented to person, place, and time  HEENT:  Ears - bilateral TM's and external ear canals clear  Eyes - pupillary responses were normal.  Extraocular muscle function intact. Lids and conjunctiva not injected. Fundoscopic exam revealed sharp disc margins. eye movements intact  Pharynx- clear with teeth in good repair. No masses were noted  Neck - supple without thyromegaly or burit. No JVD noted  Lungs - clear to auscultation and percussion  Cardiac- normal rate, regular rhythm without murmurs. PMI not displaced. No gallop, rub or click  Abdomen - flat, soft, non-tender without palpable organomegaly or mass. No pulsatile mass was felt, and not bruit was heard.   Bowel sounds were active  : Circumcised, Testes descended w/o masses  Rectal: Deferred  Extremities -  no clubbing cyanosis or edema  Lymphatics - no palpable lymphadenopathy, no hepatosplenomegaly  Hematologic: no petechiae or purpura  Peripheral vascular -Femoral, Dorsalis pedis and posterior tibial pulses felt without difficulty  Skin - no rash or unusual mole change noted  Neurological - Cranial nerves II-XII grossly intact. Motor strength 5/5. DTR's 2+ and symmetric. Station and gait normal  Back exam - full range of motion, no tenderness, palpable spasm or pain on motion  Musculoskeletal - no joint tenderness, deformity or swelling        Results for orders placed or performed in visit on 09/12/19   HEMOGLOBIN A1C WITH EAG   Result Value Ref Range    Hemoglobin A1c 6.1 (H) 4.8 - 5.6 %    Estimated average glucose 134 mg/dL   METABOLIC PANEL, COMPREHENSIVE   Result Value Ref Range    Glucose 85 75 - 110 mg/dL    BUN 29.0 (H) 9.0 - 20.0 mg/dL    Creatinine 1.7 (H) 0.8 - 1.5 mg/dL    Sodium 146 (H) 137 - 145 mmol/L    Potassium 5.6 (H) 3.6 - 5.0 mmol/L    Chloride 109 (H) 98 - 107 mmol/L    CO2 25.0 22.0 - 32.0 mmol/L    Calcium 10.6 (H) 8.4 - 10.2 mg/dl    Protein, total 7.6 6.3 - 8.2 g/dL    Albumin 4.5 3.9 - 5.4 g/dL    ALT (SGPT) 21 0 - 50 U/L    AST (SGOT) 29.0 14.0 - 36.0 U/L    Alk.  phosphatase 63 38 - 126 U/L    Bilirubin, total 1.4 (H) 0.2 - 1.3 mg/dL    BUN/Creatinine ratio 17 Ratio    GFR est AA 47 <60 mL/min/1.73m2    GFR est non-AA 39 <60 mL/min/1.73m2    Globulin 3.10     A-G Ratio 1.5 Ratio    Anion gap 12 mmol/L   LIPID PANEL   Result Value Ref Range    Cholesterol, total 101 0 - 200 mg/dL    Triglyceride 110 0 - 200 mg/dL    HDL Cholesterol 39 35 - 130 mg/dL    VLDL 22 mg/dL    LDL, calculated 40 0 - 130 mg/dL    CHOL/HDL Ratio 3 0 - 4 Ratio    LDL/HDL Ratio 1 Ratio   CK   Result Value Ref Range    .00 30.00 - 135.00 U/L       Advice/Referrals/Counseling   Education and counseling provided:  Are appropriate based on today's review and evaluation  End-of-Life planning (with patient's consent)  Pneumococcal Vaccine  Influenza Vaccine  Colorectal cancer screening tests      Assessment/Plan     ASSESSMENT:   1. Hypertensive kidney disease with chronic kidney disease stage III ([facility]    2. Controlled type 2 diabetes mellitus with stage 3 chronic kidney disease, without long-term current use of insulin ([facility]    3. Mixed hyperlipidemia    4. Primary osteoarthritis involving multiple joints    5. Chronic kidney disease, stage 3 ([facility]    6. Severe obesity (BMI 35.0-39. 9) with comorbidity ([facility]    7. Polymyalgia rheumatica ([facility]    8. Prostate cancer ([facility]    9. Alcohol screening    10. Medicare annual wellness visit, subsequent      Impression  1. Hypertension that is controlled so continue current therapy reviewed with him. 2.  Diabetes repeat status pending and prior lab reviewed no make adjustments if necessary. 3   Hyperlipidemia prior lab reviewed and repeat status pending and I will adjust if needed. 4. DJD that is stable  5   CKD stage III repeat status pending  6. Obesity we did discuss diet, exercise and weight reduction for overall health benefit. 7.  PMR now in remission  8. Prostate cancer repeat PSA pending  9. Annual alcohol screening is done at this point he does not drink alcohol. I did caution regarding complications of more than 2 drinks per day in males with increased risk of liver disease and other GI complications as well as cardiovascular complications. 10.  Medicare subsequent annual wellness examination screening questionnaire is completed today. The results were reviewed with him and his questions were answered. Lifestyle recommendations and modifications discussed and made.     PLAN:  .  Orders Placed This Encounter    CBC WITH AUTOMATED DIFF    METABOLIC PANEL, COMPREHENSIVE ([facility])    LIPID PANEL ([facility])    CK ([facility])    HEMOGLOBIN A1C W/O EAG ([facility])    T4, FREE ([facility])    TSH 3RD GENERATION ([facility])    URINALYSIS W/O MICRO ([facility])    URINE, MICROALBUMIN, SEMIQUANTITATIVE ([facility])    PROSTATE SPECIFIC AG ([facility])         ATTENTION:   This medical record was transcribed using an electronic medical records system. Although proofread, it may and can contain electronic and spelling errors. Other human spelling and other errors may be present. Corrections may be executed at a later time. Please feel free to contact us for any clarifications as needed. Follow-up and Dispositions    · Return in about 3 months (around 3/17/2020). Kimble Lundborg, MD    Recommended healthy diet low in carbohydrates, fats, sodium and cholesterol. Recommended regular cardiovascular exercise 3-6 times per week for 30-60 minutes daily. Current Outpatient Medications   Medication Sig Dispense Refill    ezetimibe (ZETIA) 10 mg tablet TAKE 1 TABLET BY MOUTH ONCE DAILY AT BEDTIME 90 Tab 3    amLODIPine (NORVASC) 5 mg tablet TAKE 1 TABLET BY MOUTH ONCE DAILY 90 Tab 3    simvastatin (ZOCOR) 20 mg tablet TAKE 1 TABLET BY MOUTH ONCE DAILY AT  NIGHT 90 Tab 3    losartan (COZAAR) 50 mg tablet TAKE 1 TABLET BY MOUTH ONCE DAILY 90 Tab 3    glimepiride (AMARYL) 4 mg tablet TAKE 1 TABLET BY MOUTH IN THE MORNING 90 Tab 3    lisinopril (PRINIVIL, ZESTRIL) 40 mg tablet TAKE 1 TABLET BY MOUTH ONCE DAILY 90 Tab 3    metFORMIN ER (GLUCOPHAGE XR) 500 mg tablet TAKE 2 TABLETS BY MOUTH TWICE DAILY 360 Tab 3    JANUVIA 100 mg tablet TAKE ONE TABLET BY MOUTH ONCE DAILY 90 Tab 3    aspirin delayed-release 81 mg tablet Take  by mouth daily.  Omega-3-DHA-EPA-Fish Oil 1,000 mg (120 mg-180 mg) cap Take 1,500 mg by mouth daily.  nicotinic acid (NIACIN) 500 mg tablet Take 500 mg by mouth Daily (before breakfast). No results found for any visits on 12/17/19. Verbal and written instructions (see AVS) provided. Patient expresses understanding of diagnosis and treatment plan.     Kimble Lundborg, MD

## 2019-12-17 ENCOUNTER — OFFICE VISIT (OUTPATIENT)
Dept: INTERNAL MEDICINE CLINIC | Age: 84
End: 2019-12-17

## 2019-12-17 VITALS
TEMPERATURE: 97.6 F | SYSTOLIC BLOOD PRESSURE: 138 MMHG | HEIGHT: 67 IN | BODY MASS INDEX: 35 KG/M2 | OXYGEN SATURATION: 94 % | DIASTOLIC BLOOD PRESSURE: 74 MMHG | RESPIRATION RATE: 16 BRPM | HEART RATE: 66 BPM | WEIGHT: 223 LBS

## 2019-12-17 DIAGNOSIS — C61 PROSTATE CANCER (HCC): ICD-10-CM

## 2019-12-17 DIAGNOSIS — N18.30 CHRONIC KIDNEY DISEASE, STAGE 3 (HCC): ICD-10-CM

## 2019-12-17 DIAGNOSIS — Z00.00 MEDICARE ANNUAL WELLNESS VISIT, SUBSEQUENT: ICD-10-CM

## 2019-12-17 DIAGNOSIS — N39.0 URINARY TRACT INFECTION WITHOUT HEMATURIA, SITE UNSPECIFIED: ICD-10-CM

## 2019-12-17 DIAGNOSIS — E78.2 MIXED HYPERLIPIDEMIA: ICD-10-CM

## 2019-12-17 DIAGNOSIS — E11.22 CONTROLLED TYPE 2 DIABETES MELLITUS WITH STAGE 3 CHRONIC KIDNEY DISEASE, WITHOUT LONG-TERM CURRENT USE OF INSULIN (HCC): ICD-10-CM

## 2019-12-17 DIAGNOSIS — Z13.39 ALCOHOL SCREENING: ICD-10-CM

## 2019-12-17 DIAGNOSIS — M35.3 POLYMYALGIA RHEUMATICA (HCC): ICD-10-CM

## 2019-12-17 DIAGNOSIS — M15.9 PRIMARY OSTEOARTHRITIS INVOLVING MULTIPLE JOINTS: ICD-10-CM

## 2019-12-17 DIAGNOSIS — E66.01 SEVERE OBESITY (BMI 35.0-39.9) WITH COMORBIDITY (HCC): ICD-10-CM

## 2019-12-17 DIAGNOSIS — N18.30 CONTROLLED TYPE 2 DIABETES MELLITUS WITH STAGE 3 CHRONIC KIDNEY DISEASE, WITHOUT LONG-TERM CURRENT USE OF INSULIN (HCC): ICD-10-CM

## 2019-12-17 DIAGNOSIS — I12.9 HYPERTENSIVE KIDNEY DISEASE WITH CHRONIC KIDNEY DISEASE STAGE III (HCC): Primary | ICD-10-CM

## 2019-12-17 DIAGNOSIS — N18.30 HYPERTENSIVE KIDNEY DISEASE WITH CHRONIC KIDNEY DISEASE STAGE III (HCC): Primary | ICD-10-CM

## 2019-12-17 LAB
A-G RATIO,AGRAT: 1.5 RATIO
ALBUMIN SERPL-MCNC: 4.5 G/DL (ref 3.9–5.4)
ALP SERPL-CCNC: 71 U/L (ref 38–126)
ALT SERPL-CCNC: 28 U/L (ref 0–50)
ANION GAP SERPL CALC-SCNC: 14 MMOL/L
AST SERPL W P-5'-P-CCNC: 34 U/L (ref 14–36)
BACTERIA,BACTU: ABNORMAL
BILIRUB SERPL-MCNC: 1.7 MG/DL (ref 0.2–1.3)
BILIRUB UR QL: NEGATIVE
BUN SERPL-MCNC: 18 MG/DL (ref 9–20)
BUN/CREATININE RATIO,BUCR: 12 RATIO
CALCIUM SERPL-MCNC: 10.7 MG/DL (ref 8.4–10.2)
CHLORIDE SERPL-SCNC: 103 MMOL/L (ref 98–107)
CHOL/HDL RATIO,CHHD: 2 RATIO (ref 0–4)
CHOLEST SERPL-MCNC: 99 MG/DL (ref 0–200)
CK SERPL-CCNC: 112 U/L (ref 30–135)
CLARITY: CLEAR
CO2 SERPL-SCNC: 26 MMOL/L (ref 22–32)
COLOR UR: ABNORMAL
CREAT SERPL-MCNC: 1.5 MG/DL (ref 0.8–1.5)
GLOBULIN,GLOB: 3.1
GLUCOSE 24H UR-MRATE: NEGATIVE G/(24.H)
GLUCOSE SERPL-MCNC: 93 MG/DL (ref 75–110)
HDLC SERPL-MCNC: 42 MG/DL (ref 35–130)
HGB UR QL STRIP: ABNORMAL
KETONES UR QL STRIP.AUTO: NEGATIVE
LDL/HDL RATIO,LDHD: 1 RATIO
LDLC SERPL CALC-MCNC: 37 MG/DL (ref 0–130)
LEUKOCYTE ESTERASE: NEGATIVE
MICROALBUMIN, URINE: 50 MG/L (ref 0–20)
NITRITE UR QL STRIP.AUTO: NEGATIVE
PH UR STRIP: 5 [PH] (ref 5–7)
POTASSIUM SERPL-SCNC: 5.4 MMOL/L (ref 3.6–5)
PROT SERPL-MCNC: 7.6 G/DL (ref 6.3–8.2)
PROT UR STRIP-MCNC: ABNORMAL MG/DL
PSA, TEST22: 0.1 NG/ML (ref 0–4)
RBC #/AREA URNS HPF: ABNORMAL #/HPF
SODIUM SERPL-SCNC: 143 MMOL/L (ref 137–145)
SP GR UR REFRACTOMETRY: 1.02 (ref 1–1.03)
T4 FREE SERPL-MCNC: 1.03 NG/DL (ref 0.58–2.3)
TRIGL SERPL-MCNC: 101 MG/DL (ref 0–200)
TSH SERPL DL<=0.05 MIU/L-ACNC: 1.55 UIU/ML (ref 0.34–5.6)
UROBILINOGEN UR QL STRIP.AUTO: NEGATIVE
VLDLC SERPL CALC-MCNC: 20 MG/DL
WBC URNS QL MICRO: ABNORMAL #/HPF

## 2019-12-17 NOTE — PATIENT INSTRUCTIONS
Body Mass Index: Care Instructions Your Care Instructions Body mass index (BMI) can help you see if your weight is raising your risk for health problems. It uses a formula to compare how much you weigh with how tall you are. · A BMI lower than 18.5 is considered underweight. · A BMI between 18.5 and 24.9 is considered healthy. · A BMI between 25 and 29.9 is considered overweight. A BMI of 30 or higher is considered obese. If your BMI is in the normal range, it means that you have a lower risk for weight-related health problems. If your BMI is in the overweight or obese range, you may be at increased risk for weight-related health problems, such as high blood pressure, heart disease, stroke, arthritis or joint pain, and diabetes. If your BMI is in the underweight range, you may be at increased risk for health problems such as fatigue, lower protection (immunity) against illness, muscle loss, bone loss, hair loss, and hormone problems. BMI is just one measure of your risk for weight-related health problems. You may be at higher risk for health problems if you are not active, you eat an unhealthy diet, or you drink too much alcohol or use tobacco products. Follow-up care is a key part of your treatment and safety. Be sure to make and go to all appointments, and call your doctor if you are having problems. It's also a good idea to know your test results and keep a list of the medicines you take. How can you care for yourself at home? · Practice healthy eating habits. This includes eating plenty of fruits, vegetables, whole grains, lean protein, and low-fat dairy. · If your doctor recommends it, get more exercise. Walking is a good choice. Bit by bit, increase the amount you walk every day. Try for at least 30 minutes on most days of the week. · Do not smoke. Smoking can increase your risk for health problems.  If you need help quitting, talk to your doctor about stop-smoking programs and medicines. These can increase your chances of quitting for good. · Limit alcohol to 2 drinks a day for men and 1 drink a day for women. Too much alcohol can cause health problems. If you have a BMI higher than 25 · Your doctor may do other tests to check your risk for weight-related health problems. This may include measuring the distance around your waist. A waist measurement of more than 40 inches in men or 35 inches in women can increase the risk of weight-related health problems. · Talk with your doctor about steps you can take to stay healthy or improve your health. You may need to make lifestyle changes to lose weight and stay healthy, such as changing your diet and getting regular exercise. If you have a BMI lower than 18.5 · Your doctor may do other tests to check your risk for health problems. · Talk with your doctor about steps you can take to stay healthy or improve your health. You may need to make lifestyle changes to gain or maintain weight and stay healthy, such as getting more healthy foods in your diet and doing exercises to build muscle. Where can you learn more? Go to http://terry-farhan.info/. Enter S176 in the search box to learn more about \"Body Mass Index: Care Instructions. \" Current as of: March 28, 2019 Content Version: 12.2 © 1055-2163 "[employer]. Care instructions adapted under license by [employer] (which disclaims liability or warranty for this information). If you have questions about a medical condition or this instruction, always ask your healthcare professional. [address] any warranty or liability for your use of this information.

## 2019-12-18 LAB
BASOPHILS # BLD AUTO: 0.1 X10E3/UL (ref 0–0.2)
BASOPHILS NFR BLD AUTO: 1 %
EOSINOPHIL # BLD AUTO: 0 X10E3/UL (ref 0–0.4)
EOSINOPHIL NFR BLD AUTO: 0 %
ERYTHROCYTE [DISTWIDTH] IN BLOOD BY AUTOMATED COUNT: 12.4 % (ref 12.3–15.4)
HBA1C MFR BLD HPLC: 5.3 % (ref 4–5.7)
HCT VFR BLD AUTO: 40.6 % (ref 37.5–51)
HGB BLD-MCNC: 13.6 G/DL (ref 13–17.7)
IMM GRANULOCYTES # BLD AUTO: 0 X10E3/UL (ref 0–0.1)
IMM GRANULOCYTES NFR BLD AUTO: 0 %
LYMPHOCYTES # BLD AUTO: 2 X10E3/UL (ref 0.7–3.1)
LYMPHOCYTES NFR BLD AUTO: 22 %
MCH RBC QN AUTO: 31.1 PG (ref 26.6–33)
MCHC RBC AUTO-ENTMCNC: 33.5 G/DL (ref 31.5–35.7)
MCV RBC AUTO: 93 FL (ref 79–97)
MONOCYTES # BLD AUTO: 1 X10E3/UL (ref 0.1–0.9)
MONOCYTES NFR BLD AUTO: 11 %
NEUTROPHILS # BLD AUTO: 6 X10E3/UL (ref 1.4–7)
NEUTROPHILS NFR BLD AUTO: 66 %
PLATELET # BLD AUTO: 289 X10E3/UL (ref 150–450)
RBC # BLD AUTO: 4.38 X10E6/UL (ref 4.14–5.8)
WBC # BLD AUTO: 9.3 X10E3/UL (ref 3.4–10.8)

## 2019-12-19 LAB — BACTERIA UR CULT: NO GROWTH

## 2020-01-15 PROBLEM — Z00.00 MEDICARE ANNUAL WELLNESS VISIT, SUBSEQUENT: Status: RESOLVED | Noted: 2017-10-27 | Resolved: 2020-01-15

## 2020-03-17 NOTE — PROGRESS NOTES
Chief Complaint   Patient presents with    Follow-up     3 month f//u       SUBJECTIVE:    Tati Berumen is a 80 y.o. male who returns in follow-up of his medical problems to include hypertension, diabetes, hyperlipidemia, DJD, history of polymyalgia rheumatica, obesity and other medical problems. He is taking his medications and trying to follow his diet and remains physically active. He currently denies any chest pain, shortness of breath, palpitations, PND, orthopnea or other cardiorespiratory complaints. He notes no GI or  complaints. He notes no headaches, dizziness or neurologic complaints. He has no current active arthritic complaints and there are no other complaints on complete review of systems. Current Outpatient Medications   Medication Sig Dispense Refill    ezetimibe (ZETIA) 10 mg tablet TAKE 1 TABLET BY MOUTH ONCE DAILY AT BEDTIME 90 Tab 3    amLODIPine (NORVASC) 5 mg tablet TAKE 1 TABLET BY MOUTH ONCE DAILY 90 Tab 3    simvastatin (ZOCOR) 20 mg tablet TAKE 1 TABLET BY MOUTH ONCE DAILY AT  NIGHT 90 Tab 3    losartan (COZAAR) 50 mg tablet TAKE 1 TABLET BY MOUTH ONCE DAILY 90 Tab 3    glimepiride (AMARYL) 4 mg tablet TAKE 1 TABLET BY MOUTH IN THE MORNING 90 Tab 3    lisinopril (PRINIVIL, ZESTRIL) 40 mg tablet TAKE 1 TABLET BY MOUTH ONCE DAILY 90 Tab 3    metFORMIN ER (GLUCOPHAGE XR) 500 mg tablet TAKE 2 TABLETS BY MOUTH TWICE DAILY 360 Tab 3    JANUVIA 100 mg tablet TAKE ONE TABLET BY MOUTH ONCE DAILY 90 Tab 3    aspirin delayed-release 81 mg tablet Take  by mouth daily.  Omega-3-DHA-EPA-Fish Oil 1,000 mg (120 mg-180 mg) cap Take 1,500 mg by mouth daily.  nicotinic acid (NIACIN) 500 mg tablet Take 500 mg by mouth Daily (before breakfast).        Past Medical History:   Diagnosis Date    Cancer [facility])     prostate    Cardiac arrhythmia 7/20/2017    Colon cancer screening 7/20/2017    [facility]     Type II    Diabetes ([facility]     DJD (degenerative joint disease) 2017    Former smoker 2017    Hypercholesterolemia     Hyperlipidemia 2017    Hypertension     Hypertension with renal disease 2017    On statin therapy 2017    Polymyalgia rheumatica ([facility] [address] 2017     Past Surgical History:   Procedure Laterality Date    HX PROSTATECTOMY      1969    HX PROSTATECTOMY       No Known Allergies    REVIEW OF SYSTEMS:  General: negative for - chills or fever, or weight loss or gain  ENT: negative for - headaches, nasal congestion or tinnitus  Eyes: no blurred or visual changes  Neck: No stiffness or swollen nodes  Respiratory: negative for - cough, hemoptysis, shortness of breath or wheezing  Cardiovascular : negative for - chest pain, edema, palpitations or shortness of breath  Gastrointestinal: negative for - abdominal pain, blood in stools, heartburn or nausea/vomiting  Genito-Urinary: no dysuria, trouble voiding, or hematuria  Musculoskeletal: negative for - gait disturbance, joint pain, joint stiffness or joint swelling  Neurological: no TIA or stroke symptoms  Hematologic: no bruises, no bleeding  Lymphatic: no swollen glands  Integument: no lumps, mole changes, nail changes or rash  Endocrine:no malaise/lethargy poly uria or polydipsia or unexpected weight changes        Social History     Socioeconomic History    Marital status:      Spouse name: Not on file    Number of children: Not on file    Years of education: Not on file    Highest education level: Not on file   Tobacco Use    Smoking status: Former Smoker     Packs/day: 1.00     Years: 50.00     Pack years: 50.00     Types: Cigarettes     Start date: 1955     Last attempt to quit: 2008     Years since quittin.6    Smokeless tobacco: Never Used   Substance and Sexual Activity    Alcohol use: No    Drug use: No    Sexual activity: Never   Social History Narrative    ** Merged History Encounter **          Family History   Problem Relation Age of Onset    Heart Disease Mother     Cancer Father        OBJECTIVE:     Visit Vitals  /78   Pulse 62   Temp 97.5 °F (36.4 °C) (Oral)   Ht 5' 7\" (1.702 m)   Wt 226 lb (102.5 kg)   SpO2 98%   BMI 35.40 kg/m²     CONSTITUTIONAL:   well nourished, appears age appropriate  EYES: sclera anicteric, PERRL, EOMI  ENMT:nares clear, moist mucous membranes, pharynx clear  NECK: supple. Thyroid normal, No JVD or bruits  RESPIRATORY: Chest: clear to ascultation and percussion, normal inspiratory effort  CARDIOVASCULAR: Heart: regular rate and rhythm no murmurs, rubs or gallops, PMI not displaced, No thrills  GASTROINTESTINAL: Abdomen: non distended, soft, non tender, bowel sounds normal  HEMATOLOGIC: no purpura, petechiae or bruising  LYMPHATIC: No lymph node enlargemant  MUSCULOSKELETAL: Extremities: no edema or active synovitis, pulse 1+. No diabetic foot changes noted. INTEGUMENT: No unusual rashes or suspicious skin lesions noted. Nails appear normal.  PERIPHERAL VASCULAR: normal pulses femoral, PT and DP  NEUROLOGIC: non-focal exam, A & O X 3 sensation and proprioception all toes both feet. PSYCHIATRIC:, appropriate affect     ASSESSMENT:   1. Hypertensive kidney disease with chronic kidney disease stage III ([address]    2. Controlled type 2 diabetes mellitus with stage 3 chronic kidney disease, without long-term current use of insulin ([address]    3. Mixed hyperlipidemia    4. Primary osteoarthritis involving multiple joints    5. Severe obesity (BMI 35.0-39. 9) with comorbidity ([address]    6. Chronic kidney disease, stage 3 (HCC)    7. Polymyalgia rheumatica ([address]    8. Prostate cancer ([address]      Impression  1. Hypertension that is controlled so continue current therapy reviewed with him. 2.  Diabetes repeat status pending a prior lab review not make adjustments if necessary. 3.  Hyperlipidemia prior lab reviewed and repeat status pending and I will adjust if needed. 4. DJD that is stable  5.   Obesity we did discuss diet, exercise and weight reduction for overall health benefit. 6   CKD stage III repeat status pending  7. Polymyalgia rheumatica stable  8. Prostate cancer status post prostatectomy many years ago and stable  I will call with lab results and make further recommendations or adjustments if necessary. Follow-up in 3 months or sooner if there is a problem. PLAN:  .  Orders Placed This Encounter    HEMOGLOBIN A1C WITH EAG    METABOLIC PANEL, COMPREHENSIVE ([facility])    LIPID PANEL ([facility])    CK ([facility])         ATTENTION:   This medical record was transcribed using an electronic medical records system. Although proofread, it may and can contain electronic and spelling errors. Other human spelling and other errors may be present. Corrections may be executed at a later time. Please feel free to contact us for any clarifications as needed. Follow-up and Dispositions    · Return in about 3 months (around 6/18/2020). No results found for any visits on 03/18/20. Jean-Claude Barton MD    The patient verbalized understanding of the problems and plans as explained.

## 2020-03-18 ENCOUNTER — OFFICE VISIT (OUTPATIENT)
Dept: INTERNAL MEDICINE CLINIC | Age: 85
End: 2020-03-18

## 2020-03-18 VITALS
DIASTOLIC BLOOD PRESSURE: 78 MMHG | BODY MASS INDEX: 35.47 KG/M2 | HEIGHT: 67 IN | SYSTOLIC BLOOD PRESSURE: 138 MMHG | OXYGEN SATURATION: 98 % | TEMPERATURE: 97.5 F | HEART RATE: 62 BPM | WEIGHT: 226 LBS

## 2020-03-18 DIAGNOSIS — N18.30 HYPERTENSIVE KIDNEY DISEASE WITH CHRONIC KIDNEY DISEASE STAGE III (HCC): Primary | ICD-10-CM

## 2020-03-18 DIAGNOSIS — N18.30 CONTROLLED TYPE 2 DIABETES MELLITUS WITH STAGE 3 CHRONIC KIDNEY DISEASE, WITHOUT LONG-TERM CURRENT USE OF INSULIN (HCC): ICD-10-CM

## 2020-03-18 DIAGNOSIS — C61 PROSTATE CANCER (HCC): ICD-10-CM

## 2020-03-18 DIAGNOSIS — N18.30 CHRONIC KIDNEY DISEASE, STAGE 3 (HCC): ICD-10-CM

## 2020-03-18 DIAGNOSIS — E78.2 MIXED HYPERLIPIDEMIA: ICD-10-CM

## 2020-03-18 DIAGNOSIS — I12.9 HYPERTENSIVE KIDNEY DISEASE WITH CHRONIC KIDNEY DISEASE STAGE III (HCC): Primary | ICD-10-CM

## 2020-03-18 DIAGNOSIS — M35.3 POLYMYALGIA RHEUMATICA (HCC): ICD-10-CM

## 2020-03-18 DIAGNOSIS — E11.22 CONTROLLED TYPE 2 DIABETES MELLITUS WITH STAGE 3 CHRONIC KIDNEY DISEASE, WITHOUT LONG-TERM CURRENT USE OF INSULIN (HCC): ICD-10-CM

## 2020-03-18 DIAGNOSIS — M15.9 PRIMARY OSTEOARTHRITIS INVOLVING MULTIPLE JOINTS: ICD-10-CM

## 2020-03-18 DIAGNOSIS — E66.01 SEVERE OBESITY (BMI 35.0-39.9) WITH COMORBIDITY (HCC): ICD-10-CM

## 2020-03-18 LAB
A-G RATIO,AGRAT: 1.5 RATIO
ALBUMIN SERPL-MCNC: 4.5 G/DL (ref 3.9–5.4)
ALP SERPL-CCNC: 51 U/L (ref 38–126)
ALT SERPL-CCNC: 22 U/L (ref 0–50)
ANION GAP SERPL CALC-SCNC: 15 MMOL/L
AST SERPL W P-5'-P-CCNC: 32 U/L (ref 14–36)
BILIRUB SERPL-MCNC: 1.2 MG/DL (ref 0.2–1.3)
BUN SERPL-MCNC: 23 MG/DL (ref 9–20)
BUN/CREATININE RATIO,BUCR: 14 RATIO
CALCIUM SERPL-MCNC: 10.3 MG/DL (ref 8.4–10.2)
CHLORIDE SERPL-SCNC: 106 MMOL/L (ref 98–107)
CHOL/HDL RATIO,CHHD: 2 RATIO (ref 0–4)
CHOLEST SERPL-MCNC: 100 MG/DL (ref 0–200)
CK SERPL-CCNC: 135 U/L (ref 30–135)
CO2 SERPL-SCNC: 23 MMOL/L (ref 22–32)
CREAT SERPL-MCNC: 1.6 MG/DL (ref 0.8–1.5)
GLOBULIN,GLOB: 3.1
GLUCOSE SERPL-MCNC: 75 MG/DL (ref 75–110)
HDLC SERPL-MCNC: 46 MG/DL (ref 35–130)
LDL/HDL RATIO,LDHD: 1 RATIO
LDLC SERPL CALC-MCNC: 32 MG/DL (ref 0–130)
POTASSIUM SERPL-SCNC: 5.1 MMOL/L (ref 3.6–5)
PROT SERPL-MCNC: 7.6 G/DL (ref 6.3–8.2)
SODIUM SERPL-SCNC: 144 MMOL/L (ref 137–145)
TRIGL SERPL-MCNC: 112 MG/DL (ref 0–200)
VLDLC SERPL CALC-MCNC: 22 MG/DL

## 2020-03-18 NOTE — PROGRESS NOTES
Chief Complaint   Patient presents with    Follow-up     3 month f//u     1. Have you been to the ER, urgent care clinic since your last visit? Hospitalized since your last visit? No    2. Have you seen or consulted any other health care providers outside of the [address] [place] [address] since your last visit? Include any pap smears or colon screening.  No

## 2020-03-18 NOTE — PATIENT INSTRUCTIONS
Body Mass Index: Care Instructions Your Care Instructions Body mass index (BMI) can help you see if your weight is raising your risk for health problems. It uses a formula to compare how much you weigh with how tall you are. · A BMI lower than 18.5 is considered underweight. · A BMI between 18.5 and 24.9 is considered healthy. · A BMI between 25 and 29.9 is considered overweight. A BMI of 30 or higher is considered obese. If your BMI is in the normal range, it means that you have a lower risk for weight-related health problems. If your BMI is in the overweight or obese range, you may be at increased risk for weight-related health problems, such as high blood pressure, heart disease, stroke, arthritis or joint pain, and diabetes. If your BMI is in the underweight range, you may be at increased risk for health problems such as fatigue, lower protection (immunity) against illness, muscle loss, bone loss, hair loss, and hormone problems. BMI is just one measure of your risk for weight-related health problems. You may be at higher risk for health problems if you are not active, you eat an unhealthy diet, or you drink too much alcohol or use tobacco products. Follow-up care is a key part of your treatment and safety. Be sure to make and go to all appointments, and call your doctor if you are having problems. It's also a good idea to know your test results and keep a list of the medicines you take. How can you care for yourself at home? · Practice healthy eating habits. This includes eating plenty of fruits, vegetables, whole grains, lean protein, and low-fat dairy. · If your doctor recommends it, get more exercise. Walking is a good choice. Bit by bit, increase the amount you walk every day. Try for at least 30 minutes on most days of the week. · Do not smoke. Smoking can increase your risk for health problems.  If you need help quitting, talk to your doctor about stop-smoking programs and medicines. These can increase your chances of quitting for good. · Limit alcohol to 2 drinks a day for men and 1 drink a day for women. Too much alcohol can cause health problems. If you have a BMI higher than 25 · Your doctor may do other tests to check your risk for weight-related health problems. This may include measuring the distance around your waist. A waist measurement of more than 40 inches in men or 35 inches in women can increase the risk of weight-related health problems. · Talk with your doctor about steps you can take to stay healthy or improve your health. You may need to make lifestyle changes to lose weight and stay healthy, such as changing your diet and getting regular exercise. If you have a BMI lower than 18.5 · Your doctor may do other tests to check your risk for health problems. · Talk with your doctor about steps you can take to stay healthy or improve your health. You may need to make lifestyle changes to gain or maintain weight and stay healthy, such as getting more healthy foods in your diet and doing exercises to build muscle. Where can you learn more? Go to http://terry-farhan.info/ Enter S176 in the search box to learn more about \"Body Mass Index: Care Instructions. \" Current as of: December 10, 2019Content Version: 12.4 © 8808-5998 [employer]orporated. Care instructions adapted under license by [employer] (which disclaims liability or warranty for this information). If you have questions about a medical condition or this instruction, always ask your healthcare professional. [address] any warranty or liability for your use of this information.

## 2020-03-19 LAB
EST. AVERAGE GLUCOSE BLD GHB EST-MCNC: 140 MG/DL
HBA1C MFR BLD: 6.5 % (ref 4.8–5.6)

## 2020-04-27 RX ORDER — METFORMIN HYDROCHLORIDE 500 MG/1
TABLET, EXTENDED RELEASE ORAL
Qty: 360 TAB | Refills: 3 | Status: SHIPPED | OUTPATIENT
Start: 2020-04-27 | End: 2021-05-05

## 2020-04-27 NOTE — TELEPHONE ENCOUNTER
RX refill request from the patient/pharmacy. Patient last seen 03- with labs, and next appt. scheduled for 06-  Requested Prescriptions     Pending Prescriptions Disp Refills    metFORMIN ER (GLUCOPHAGE XR) 500 mg tablet [Pharmacy Med Name: metFORMIN HCl  MG Oral Tablet Extended Release 24 Hour] 360 Tab 3     Sig: Take 2 tablets by mouth twice daily   .

## 2020-06-02 DIAGNOSIS — I10 ESSENTIAL HYPERTENSION: ICD-10-CM

## 2020-06-02 RX ORDER — LISINOPRIL 40 MG/1
TABLET ORAL
Qty: 90 TAB | Refills: 1 | Status: SHIPPED | OUTPATIENT
Start: 2020-06-02 | End: 2020-12-02

## 2020-06-02 RX ORDER — GLIMEPIRIDE 4 MG/1
TABLET ORAL
Qty: 90 TAB | Refills: 1 | Status: SHIPPED | OUTPATIENT
Start: 2020-06-02 | End: 2020-12-02

## 2020-06-02 NOTE — TELEPHONE ENCOUNTER
RX refill request from the patient/pharmacy. Patient last seen 03- with labs, and next appt. scheduled for 06-  Requested Prescriptions     Pending Prescriptions Disp Refills    glimepiride (AMARYL) 4 mg tablet [Pharmacy Med Name: Glimepiride 4 MG Oral Tablet] 90 Tab 1     Sig: TAKE 1 TABLET BY MOUTH IN THE MORNING    lisinopriL (PRINIVIL, ZESTRIL) 40 mg tablet [Pharmacy Med Name: Lisinopril 40 MG Oral Tablet] 90 Tab 1     Sig: Take 1 tablet by mouth once daily   .

## 2020-06-16 NOTE — PROGRESS NOTES
Chief Complaint   Patient presents with    Hypertension     3 month f/up    Cholesterol Problem    Diabetes    Chronic Kidney Disease       SUBJECTIVE:    Yaz Martinez is a 80 y.o. male who returns in follow-up of his medical problems include hypertension, diabetes, hyperlipidemia, DJD, CKD stage III, history of polymyalgia rheumatica, obesity and other medical problems. He is taking his medications and trying to follow his diet and remain physically active. He currently denies any chest pain, shortness of breath, palpitations, PND, orthopnea or other cardiorespiratory complaints. There are no GI or  complaints. He notes no headaches, dizziness or neurologic complaints. He has no current active arthritic complaints and and no other complaints on complete review of systems. Current Outpatient Medications   Medication Sig Dispense Refill    glimepiride (AMARYL) 4 mg tablet TAKE 1 TABLET BY MOUTH IN THE MORNING 90 Tab 1    lisinopriL (PRINIVIL, ZESTRIL) 40 mg tablet Take 1 tablet by mouth once daily 90 Tab 1    metFORMIN ER (GLUCOPHAGE XR) 500 mg tablet Take 2 tablets by mouth twice daily 360 Tab 3    ezetimibe (ZETIA) 10 mg tablet TAKE 1 TABLET BY MOUTH ONCE DAILY AT BEDTIME 90 Tab 3    amLODIPine (NORVASC) 5 mg tablet TAKE 1 TABLET BY MOUTH ONCE DAILY 90 Tab 3    simvastatin (ZOCOR) 20 mg tablet TAKE 1 TABLET BY MOUTH ONCE DAILY AT  NIGHT 90 Tab 3    losartan (COZAAR) 50 mg tablet TAKE 1 TABLET BY MOUTH ONCE DAILY 90 Tab 3    JANUVIA 100 mg tablet TAKE ONE TABLET BY MOUTH ONCE DAILY 90 Tab 3    aspirin delayed-release 81 mg tablet Take  by mouth daily.  Omega-3-DHA-EPA-Fish Oil 1,000 mg (120 mg-180 mg) cap Take 1,500 mg by mouth daily.  nicotinic acid (NIACIN) 500 mg tablet Take 500 mg by mouth Daily (before breakfast).        Past Medical History:   Diagnosis Date    Cancer [facility])     prostate    Cardiac arrhythmia 7/20/2017    Colon cancer screening 7/20/2017    [email or web address]     Type II    Diabetes ([email or web address]     DJD (degenerative joint disease) 2017    Former smoker 2017    Hypercholesterolemia     Hyperlipidemia 2017    Hypertension     Hypertension with renal disease 2017    On statin therapy 2017    Polymyalgia rheumatica ([email or web address] 2017     Past Surgical History:   Procedure Laterality Date    HX PROSTATECTOMY      1969    HX PROSTATECTOMY       No Known Allergies    REVIEW OF SYSTEMS:  General: negative for - chills or fever, or weight loss or gain  ENT: negative for - headaches, nasal congestion or tinnitus  Eyes: no blurred or visual changes  Neck: No stiffness or swollen nodes  Respiratory: negative for - cough, hemoptysis, shortness of breath or wheezing  Cardiovascular : negative for - chest pain, edema, palpitations or shortness of breath  Gastrointestinal: negative for - abdominal pain, blood in stools, heartburn or nausea/vomiting  Genito-Urinary: no dysuria, trouble voiding, or hematuria  Musculoskeletal: negative for - gait disturbance, joint pain, joint stiffness or joint swelling  Neurological: no TIA or stroke symptoms  Hematologic: no bruises, no bleeding  Lymphatic: no swollen glands  Integument: no lumps, mole changes, nail changes or rash  Endocrine:no malaise/lethargy poly uria or polydipsia or unexpected weight changes        Social History     Socioeconomic History    Marital status:      Spouse name: Not on file    Number of children: Not on file    Years of education: Not on file    Highest education level: Not on file   Tobacco Use    Smoking status: Former Smoker     Packs/day: 1.00     Years: 50.00     Pack years: 50.00     Types: Cigarettes     Start date: 1955     Last attempt to quit: 2008     Years since quittin.9    Smokeless tobacco: Never Used   Substance and Sexual Activity    Alcohol use: No    Drug use: No    Sexual activity: Never   Social History Narrative    ** Merged History Encounter **          Family History   Problem Relation Age of Onset    Heart Disease Mother     Cancer Father        OBJECTIVE:     Visit Vitals  /64   Pulse 70   Temp 97.2 °F (36.2 °C)   Resp 16   Ht 5' 7\" (1.702 m)   Wt 224 lb 9.6 oz (101.9 kg)   SpO2 96%   BMI 35.18 kg/m²     CONSTITUTIONAL:   well nourished, appears age appropriate  EYES: sclera anicteric, PERRL, EOMI  ENMT:nares clear, moist mucous membranes, pharynx clear  NECK: supple. Thyroid normal, No JVD or bruits  RESPIRATORY: Chest: clear to ascultation and percussion, normal inspiratory effort  CARDIOVASCULAR: Heart: regular rate and rhythm no murmurs, rubs or gallops, PMI not displaced, No thrills, no peripheral edema  GASTROINTESTINAL: Abdomen: non distended, soft, non tender, bowel sounds normal  HEMATOLOGIC: no purpura, petechiae or bruising  LYMPHATIC: No lymph node enlargemant  MUSCULOSKELETAL: Extremities: no active synovitis, pulse 1+   INTEGUMENT: No unusual rashes or suspicious skin lesions noted. Nails appear normal.  PERIPHERAL VASCULAR: normal pulses femoral, PT and DP  NEUROLOGIC: non-focal exam, A & O X 3  PSYCHIATRIC:, appropriate affect     ASSESSMENT:   1. Hypertensive kidney disease with chronic kidney disease stage III ([address]    2. Controlled type 2 diabetes mellitus with stage 3 chronic kidney disease, without long-term current use of insulin ([address]    3. Mixed hyperlipidemia    4. Chronic kidney disease, stage 3 ([address]    5. Primary osteoarthritis involving multiple joints    6. Severe obesity (BMI 35.0-39. 9) with comorbidity ([address]    7. Polymyalgia rheumatica (HCC)      Impression  1. Hypertension that is controlled so continue current therapy reviewed with him. 2.  Diabetes repeat status pending a prior lab review not make adjustments if necessary. 3.  Hyperlipidemia prior lab reviewed and repeat status pending I will adjust if needed. 4.  CKD stage III repeat status pending  5. DJD stable  6.   Obesity we discussed diet, exercise and weight reduction for overall health benefit. 7.  History of polymyalgia rheumatica now in remission  I will call with lab and make further recommendations or adjustments if necessary. Follow-up in 3 months or sooner if there is a problem. PLAN:  .  Orders Placed This Encounter    METABOLIC PANEL, COMPREHENSIVE ([facility])    LIPID PANEL ([facility])    CK ([facility])    HEMOGLOBIN A1C W/O EAG ([facility])         ATTENTION:   This medical record was transcribed using an electronic medical records system. Although proofread, it may and can contain electronic and spelling errors. Other human spelling and other errors may be present. Corrections may be executed at a later time. Please feel free to contact us for any clarifications as needed. Follow-up and Dispositions    · Return in about 3 months (around 9/17/2020). No results found for any visits on 06/17/20. Xochitl Rausch MD    The patient verbalized understanding of the problems and plans as explained.

## 2020-06-17 ENCOUNTER — OFFICE VISIT (OUTPATIENT)
Dept: INTERNAL MEDICINE CLINIC | Age: 85
End: 2020-06-17

## 2020-06-17 VITALS
TEMPERATURE: 97.2 F | OXYGEN SATURATION: 96 % | DIASTOLIC BLOOD PRESSURE: 64 MMHG | HEART RATE: 70 BPM | BODY MASS INDEX: 35.25 KG/M2 | SYSTOLIC BLOOD PRESSURE: 138 MMHG | RESPIRATION RATE: 16 BRPM | HEIGHT: 67 IN | WEIGHT: 224.6 LBS

## 2020-06-17 DIAGNOSIS — E78.2 MIXED HYPERLIPIDEMIA: ICD-10-CM

## 2020-06-17 DIAGNOSIS — N18.30 HYPERTENSIVE KIDNEY DISEASE WITH CHRONIC KIDNEY DISEASE STAGE III (HCC): Primary | ICD-10-CM

## 2020-06-17 DIAGNOSIS — N18.30 CHRONIC KIDNEY DISEASE, STAGE 3 (HCC): ICD-10-CM

## 2020-06-17 DIAGNOSIS — N18.30 CONTROLLED TYPE 2 DIABETES MELLITUS WITH STAGE 3 CHRONIC KIDNEY DISEASE, WITHOUT LONG-TERM CURRENT USE OF INSULIN (HCC): ICD-10-CM

## 2020-06-17 DIAGNOSIS — M35.3 POLYMYALGIA RHEUMATICA (HCC): ICD-10-CM

## 2020-06-17 DIAGNOSIS — I12.9 HYPERTENSIVE KIDNEY DISEASE WITH CHRONIC KIDNEY DISEASE STAGE III (HCC): Primary | ICD-10-CM

## 2020-06-17 DIAGNOSIS — M15.9 PRIMARY OSTEOARTHRITIS INVOLVING MULTIPLE JOINTS: ICD-10-CM

## 2020-06-17 DIAGNOSIS — E66.01 SEVERE OBESITY (BMI 35.0-39.9) WITH COMORBIDITY (HCC): ICD-10-CM

## 2020-06-17 DIAGNOSIS — E11.22 CONTROLLED TYPE 2 DIABETES MELLITUS WITH STAGE 3 CHRONIC KIDNEY DISEASE, WITHOUT LONG-TERM CURRENT USE OF INSULIN (HCC): ICD-10-CM

## 2020-06-17 LAB
A-G RATIO,AGRAT: 1.6 RATIO
ALBUMIN SERPL-MCNC: 4.6 G/DL (ref 3.9–5.4)
ALP SERPL-CCNC: 63 U/L (ref 38–126)
ALT SERPL-CCNC: 24 U/L (ref 0–50)
ANION GAP SERPL CALC-SCNC: 14 MMOL/L
AST SERPL W P-5'-P-CCNC: 28 U/L (ref 14–36)
BILIRUB SERPL-MCNC: 1.7 MG/DL (ref 0.2–1.3)
BUN SERPL-MCNC: 22 MG/DL (ref 9–20)
BUN/CREATININE RATIO,BUCR: 15 RATIO
CALCIUM SERPL-MCNC: 10.7 MG/DL (ref 8.4–10.2)
CHLORIDE SERPL-SCNC: 107 MMOL/L (ref 98–107)
CHOL/HDL RATIO,CHHD: 2 RATIO (ref 0–4)
CHOLEST SERPL-MCNC: 92 MG/DL (ref 0–200)
CK SERPL-CCNC: 94 U/L (ref 30–135)
CO2 SERPL-SCNC: 23 MMOL/L (ref 22–32)
CREAT SERPL-MCNC: 1.5 MG/DL (ref 0.8–1.5)
GLOBULIN,GLOB: 2.8
GLUCOSE SERPL-MCNC: 99 MG/DL (ref 75–110)
HBA1C MFR BLD HPLC: 6.3 % (ref 4–5.7)
HDLC SERPL-MCNC: 41 MG/DL (ref 35–130)
LDL/HDL RATIO,LDHD: 1 RATIO
LDLC SERPL CALC-MCNC: 34 MG/DL (ref 0–130)
POTASSIUM SERPL-SCNC: 5.3 MMOL/L (ref 3.6–5)
PROT SERPL-MCNC: 7.4 G/DL (ref 6.3–8.2)
SODIUM SERPL-SCNC: 144 MMOL/L (ref 137–145)
TRIGL SERPL-MCNC: 86 MG/DL (ref 0–200)
VLDLC SERPL CALC-MCNC: 17 MG/DL

## 2020-06-17 NOTE — PROGRESS NOTES
Chief Complaint   Patient presents with    Hypertension     3 month f/up    Cholesterol Problem    Diabetes    Chronic Kidney Disease     1. Have you been to the ER, urgent care clinic since your last visit? Hospitalized since your last visit? No    2. Have you seen or consulted any other health care providers outside of the [address] [place] [address] since your last visit? Include any pap smears or colon screening.  No

## 2020-06-17 NOTE — PATIENT INSTRUCTIONS
Body Mass Index: Care Instructions Your Care Instructions Body mass index (BMI) can help you see if your weight is raising your risk for health problems. It uses a formula to compare how much you weigh with how tall you are. · A BMI lower than 18.5 is considered underweight. · A BMI between 18.5 and 24.9 is considered healthy. · A BMI between 25 and 29.9 is considered overweight. A BMI of 30 or higher is considered obese. If your BMI is in the normal range, it means that you have a lower risk for weight-related health problems. If your BMI is in the overweight or obese range, you may be at increased risk for weight-related health problems, such as high blood pressure, heart disease, stroke, arthritis or joint pain, and diabetes. If your BMI is in the underweight range, you may be at increased risk for health problems such as fatigue, lower protection (immunity) against illness, muscle loss, bone loss, hair loss, and hormone problems. BMI is just one measure of your risk for weight-related health problems. You may be at higher risk for health problems if you are not active, you eat an unhealthy diet, or you drink too much alcohol or use tobacco products. Follow-up care is a key part of your treatment and safety. Be sure to make and go to all appointments, and call your doctor if you are having problems. It's also a good idea to know your test results and keep a list of the medicines you take. How can you care for yourself at home? · Practice healthy eating habits. This includes eating plenty of fruits, vegetables, whole grains, lean protein, and low-fat dairy. · If your doctor recommends it, get more exercise. Walking is a good choice. Bit by bit, increase the amount you walk every day. Try for at least 30 minutes on most days of the week. · Do not smoke. Smoking can increase your risk for health problems.  If you need help quitting, talk to your doctor about stop-smoking programs and medicines. These can increase your chances of quitting for good. · Limit alcohol to 2 drinks a day for men and 1 drink a day for women. Too much alcohol can cause health problems. If you have a BMI higher than 25 · Your doctor may do other tests to check your risk for weight-related health problems. This may include measuring the distance around your waist. A waist measurement of more than 40 inches in men or 35 inches in women can increase the risk of weight-related health problems. · Talk with your doctor about steps you can take to stay healthy or improve your health. You may need to make lifestyle changes to lose weight and stay healthy, such as changing your diet and getting regular exercise. If you have a BMI lower than 18.5 · Your doctor may do other tests to check your risk for health problems. · Talk with your doctor about steps you can take to stay healthy or improve your health. You may need to make lifestyle changes to gain or maintain weight and stay healthy, such as getting more healthy foods in your diet and doing exercises to build muscle. Where can you learn more? Go to http://terry-farhan.info/ Enter S176 in the search box to learn more about \"Body Mass Index: Care Instructions. \" Current as of: December 11, 2019               Content Version: 12.5 © 3542-1791 [employer]wise, [employer]orporated. Care instructions adapted under license by [employer] (which disclaims liability or warranty for this information). If you have questions about a medical condition or this instruction, always ask your healthcare professional. [address] any warranty or liability for your use of this information.

## 2020-09-03 DIAGNOSIS — I12.9 HYPERTENSIVE KIDNEY DISEASE WITH CHRONIC KIDNEY DISEASE STAGE III (HCC): ICD-10-CM

## 2020-09-03 DIAGNOSIS — N18.30 HYPERTENSIVE KIDNEY DISEASE WITH CHRONIC KIDNEY DISEASE STAGE III (HCC): ICD-10-CM

## 2020-09-03 RX ORDER — LOSARTAN POTASSIUM 50 MG/1
TABLET ORAL
Qty: 90 TAB | Refills: 3 | Status: SHIPPED | OUTPATIENT
Start: 2020-09-03 | End: 2020-09-23 | Stop reason: SDUPTHER

## 2020-09-22 NOTE — PROGRESS NOTES
Chief Complaint   Patient presents with    Diabetes     3 month follow up    Hypertension    Cholesterol Problem       SUBJECTIVE:    Eulalia Nava is a 80 y.o. male who returns in follow-up for his medical problems include hypertension, diabetes, hyperlipidemia, CKD stage III, obesity and other medical problems. He is taking his medications and trying to follow his diet and try and remain physically active. He currently denies any chest pain, shortness of breath, palpitations, PND, orthopnea or other cardiac or respiratory complaints. He notes no GI or  complaints. He notes no headaches, dizziness or neurologic complaints. He has no current active arthritic complaints and and no other complaints on complete review of systems. Current Outpatient Medications   Medication Sig Dispense Refill    losartan (COZAAR) 50 mg tablet Take 2 Tabs by mouth daily. 90 Tab 3    glimepiride (AMARYL) 4 mg tablet TAKE 1 TABLET BY MOUTH IN THE MORNING 90 Tab 1    lisinopriL (PRINIVIL, ZESTRIL) 40 mg tablet Take 1 tablet by mouth once daily 90 Tab 1    metFORMIN ER (GLUCOPHAGE XR) 500 mg tablet Take 2 tablets by mouth twice daily 360 Tab 3    ezetimibe (ZETIA) 10 mg tablet TAKE 1 TABLET BY MOUTH ONCE DAILY AT BEDTIME 90 Tab 3    amLODIPine (NORVASC) 5 mg tablet TAKE 1 TABLET BY MOUTH ONCE DAILY 90 Tab 3    simvastatin (ZOCOR) 20 mg tablet TAKE 1 TABLET BY MOUTH ONCE DAILY AT  NIGHT 90 Tab 3    JANUVIA 100 mg tablet TAKE ONE TABLET BY MOUTH ONCE DAILY 90 Tab 3    aspirin delayed-release 81 mg tablet Take  by mouth daily.  Omega-3-DHA-EPA-Fish Oil 1,000 mg (120 mg-180 mg) cap Take 1,500 mg by mouth daily.  nicotinic acid (NIACIN) 500 mg tablet Take 500 mg by mouth Daily (before breakfast).        Past Medical History:   Diagnosis Date    Cancer [facility])     prostate    Cardiac arrhythmia 7/20/2017    Colon cancer screening 7/20/2017    Diabetes [facility] [address]     Type II    Diabetes ([facility] [address]     SHON (degenerative joint disease) 2017    Former smoker 2017    Hypercholesterolemia     Hyperlipidemia 2017    Hypertension     Hypertension with renal disease 2017    On statin therapy 2017    Polymyalgia rheumatica ([address] 2017     Past Surgical History:   Procedure Laterality Date    HX PROSTATECTOMY      1969    HX PROSTATECTOMY       No Known Allergies    REVIEW OF SYSTEMS:  General: negative for - chills or fever, or weight loss or gain  ENT: negative for - headaches, nasal congestion or tinnitus  Eyes: no blurred or visual changes  Neck: No stiffness or swollen nodes  Respiratory: negative for - cough, hemoptysis, shortness of breath or wheezing  Cardiovascular : negative for - chest pain, edema, palpitations or shortness of breath  Gastrointestinal: negative for - abdominal pain, blood in stools, heartburn or nausea/vomiting  Genito-Urinary: no dysuria, trouble voiding, or hematuria  Musculoskeletal: negative for - gait disturbance, joint pain, joint stiffness or joint swelling  Neurological: no TIA or stroke symptoms  Hematologic: no bruises, no bleeding  Lymphatic: no swollen glands  Integument: no lumps, mole changes, nail changes or rash  Endocrine:no malaise/lethargy poly uria or polydipsia or unexpected weight changes        Social History     Socioeconomic History    Marital status:      Spouse name: Not on file    Number of children: Not on file    Years of education: Not on file    Highest education level: Not on file   Tobacco Use    Smoking status: Former Smoker     Packs/day: 1.00     Years: 50.00     Pack years: 50.00     Types: Cigarettes     Start date: 1955     Last attempt to quit: 2008     Years since quittin.1    Smokeless tobacco: Never Used   Substance and Sexual Activity    Alcohol use: No    Drug use: No    Sexual activity: Never   Social History Narrative    ** Merged History Encounter **          Family History   Problem Relation Age of Onset    Heart Disease Mother     Cancer Father        OBJECTIVE:     Visit Vitals  BP (!) 168/80   Pulse 69   Temp 97.7 °F (36.5 °C) (Oral)   Resp 19   Ht 5' 7\" (1.702 m)   Wt 228 lb 12.8 oz (103.8 kg)   SpO2 95%   BMI 35.84 kg/m²     CONSTITUTIONAL:   well nourished, appears age appropriate  EYES: sclera anicteric, PERRL, EOMI  ENMT:nares clear, moist mucous membranes, pharynx clear  NECK: supple. Thyroid normal, No JVD or bruits  RESPIRATORY: Chest: clear to ascultation and percussion, normal inspiratory effort  CARDIOVASCULAR: Heart: regular rate and rhythm no murmurs, rubs or gallops, PMI not displaced, No thrills, no peripheral edema  GASTROINTESTINAL: Abdomen: non distended, soft, non tender, bowel sounds normal  HEMATOLOGIC: no purpura, petechiae or bruising  LYMPHATIC: No lymph node enlargemant  MUSCULOSKELETAL: Extremities: no active synovitis, pulse 1+   INTEGUMENT: No unusual rashes or suspicious skin lesions noted. Nails appear normal.  PERIPHERAL VASCULAR: normal pulses femoral, PT and DP  NEUROLOGIC: non-focal exam, A & O X 3  PSYCHIATRIC:, appropriate affect     ASSESSMENT:   1. Hypertensive kidney disease with chronic kidney disease stage III ([address]    2. Controlled type 2 diabetes mellitus with stage 3 chronic kidney disease, without long-term current use of insulin ([address]    3. Mixed hyperlipidemia    4. Primary osteoarthritis involving multiple joints    5. Severe obesity (BMI 35.0-39. 9) with comorbidity ([address]    6. Chronic kidney disease, stage 3 (HCC)    7. Needs flu shot      Impression  1. Hypertension that is not controlled so at this point I am increasing her losartan to 100 mg daily and continue lisinopril 40 mg daily as well as Norvasc 5 mg daily. 2.  Diabetes repeat status pending and prior lab reviewed now make adjustments if necessary. 3.  Hyperlipidemia prior lab reviewed and repeat status pending I will adjust if needed.   4   DJD that is stable   5   Obesity we did discuss diet, exercise and weight reduction for overall health benefit and note his weight is up 4 pounds which could be playing some role in his blood pressure. 6   CKD stage III repeat status pending  Flu shot given today. I will call with lab results and make further recommendations or adjustments if necessary. Follow-up in 3 months or sooner if there is a problem. I will however see him in 1 month regarding his hypertension. PLAN:  .  Orders Placed This Encounter    Influenza Vaccine, QUAD, 65 Yrs +  IM  ([facility]ad [id number] )    METABOLIC PANEL, COMPREHENSIVE ([facility])    LIPID PANEL ([facility])    CK ([facility])    HEMOGLOBIN A1C W/O EAG ([facility])    losartan (COZAAR) 50 mg tablet         ATTENTION:   This medical record was transcribed using an electronic medical records system. Although proofread, it may and can contain electronic and spelling errors. Other human spelling and other errors may be present. Corrections may be executed at a later time. Please feel free to contact us for any clarifications as needed. Follow-up and Dispositions    · Return in about 3 months (around 12/23/2020). No results found for any visits on 09/23/20. Marbella Appiah MD    The patient verbalized understanding of the problems and plans as explained.

## 2020-09-23 ENCOUNTER — OFFICE VISIT (OUTPATIENT)
Dept: INTERNAL MEDICINE CLINIC | Age: 85
End: 2020-09-23
Payer: MEDICARE

## 2020-09-23 VITALS
WEIGHT: 228.8 LBS | OXYGEN SATURATION: 95 % | RESPIRATION RATE: 19 BRPM | HEIGHT: 67 IN | SYSTOLIC BLOOD PRESSURE: 168 MMHG | TEMPERATURE: 97.7 F | HEART RATE: 69 BPM | DIASTOLIC BLOOD PRESSURE: 80 MMHG | BODY MASS INDEX: 35.91 KG/M2

## 2020-09-23 DIAGNOSIS — E11.22 CONTROLLED TYPE 2 DIABETES MELLITUS WITH STAGE 3 CHRONIC KIDNEY DISEASE, WITHOUT LONG-TERM CURRENT USE OF INSULIN (HCC): ICD-10-CM

## 2020-09-23 DIAGNOSIS — N18.30 CHRONIC KIDNEY DISEASE, STAGE 3 (HCC): ICD-10-CM

## 2020-09-23 DIAGNOSIS — Z23 NEEDS FLU SHOT: ICD-10-CM

## 2020-09-23 DIAGNOSIS — N18.30 CONTROLLED TYPE 2 DIABETES MELLITUS WITH STAGE 3 CHRONIC KIDNEY DISEASE, WITHOUT LONG-TERM CURRENT USE OF INSULIN (HCC): ICD-10-CM

## 2020-09-23 DIAGNOSIS — E78.2 MIXED HYPERLIPIDEMIA: ICD-10-CM

## 2020-09-23 DIAGNOSIS — N18.30 HYPERTENSIVE KIDNEY DISEASE WITH CHRONIC KIDNEY DISEASE STAGE III (HCC): Primary | ICD-10-CM

## 2020-09-23 DIAGNOSIS — I12.9 HYPERTENSIVE KIDNEY DISEASE WITH CHRONIC KIDNEY DISEASE STAGE III (HCC): Primary | ICD-10-CM

## 2020-09-23 DIAGNOSIS — M15.9 PRIMARY OSTEOARTHRITIS INVOLVING MULTIPLE JOINTS: ICD-10-CM

## 2020-09-23 DIAGNOSIS — E66.01 SEVERE OBESITY (BMI 35.0-39.9) WITH COMORBIDITY (HCC): ICD-10-CM

## 2020-09-23 LAB
A-G RATIO,AGRAT: 1.6 RATIO
ALBUMIN SERPL-MCNC: 4.7 G/DL (ref 3.9–5.4)
ALP SERPL-CCNC: 56 U/L (ref 38–126)
ALT SERPL-CCNC: 27 U/L (ref 0–50)
ANION GAP SERPL CALC-SCNC: 13 MMOL/L
AST SERPL W P-5'-P-CCNC: 34 U/L (ref 14–36)
BILIRUB SERPL-MCNC: 1.6 MG/DL (ref 0.2–1.3)
BUN SERPL-MCNC: 23 MG/DL (ref 9–20)
BUN/CREATININE RATIO,BUCR: 15 RATIO
CALCIUM SERPL-MCNC: 11 MG/DL (ref 8.4–10.2)
CHLORIDE SERPL-SCNC: 107 MMOL/L (ref 98–107)
CHOL/HDL RATIO,CHHD: 2 RATIO (ref 0–4)
CHOLEST SERPL-MCNC: 97 MG/DL (ref 0–200)
CK SERPL-CCNC: 142 U/L (ref 30–135)
CO2 SERPL-SCNC: 25 MMOL/L (ref 22–32)
CREAT SERPL-MCNC: 1.5 MG/DL (ref 0.8–1.5)
GLOBULIN,GLOB: 2.9
GLUCOSE SERPL-MCNC: 123 MG/DL (ref 75–110)
HBA1C MFR BLD HPLC: 6.7 % (ref 4–5.7)
HDLC SERPL-MCNC: 40 MG/DL (ref 35–130)
LDL/HDL RATIO,LDHD: 1 RATIO
LDLC SERPL CALC-MCNC: 34 MG/DL (ref 0–130)
POTASSIUM SERPL-SCNC: 4.7 MMOL/L (ref 3.6–5)
PROT SERPL-MCNC: 7.6 G/DL (ref 6.3–8.2)
SODIUM SERPL-SCNC: 145 MMOL/L (ref 137–145)
TRIGL SERPL-MCNC: 114 MG/DL (ref 0–200)
VLDLC SERPL CALC-MCNC: 23 MG/DL

## 2020-09-23 PROCEDURE — 90694 VACC AIIV4 NO PRSRV 0.5ML IM: CPT

## 2020-09-23 PROCEDURE — 82550 ASSAY OF CK (CPK): CPT | Performed by: INTERNAL MEDICINE

## 2020-09-23 PROCEDURE — G8754 DIAS BP LESS 90: HCPCS | Performed by: INTERNAL MEDICINE

## 2020-09-23 PROCEDURE — 83036 HEMOGLOBIN GLYCOSYLATED A1C: CPT | Performed by: INTERNAL MEDICINE

## 2020-09-23 PROCEDURE — G0008 ADMIN INFLUENZA VIRUS VAC: HCPCS

## 2020-09-23 PROCEDURE — G8417 CALC BMI ABV UP PARAM F/U: HCPCS | Performed by: INTERNAL MEDICINE

## 2020-09-23 PROCEDURE — G8753 SYS BP > OR = 140: HCPCS | Performed by: INTERNAL MEDICINE

## 2020-09-23 PROCEDURE — G8510 SCR DEP NEG, NO PLAN REQD: HCPCS | Performed by: INTERNAL MEDICINE

## 2020-09-23 PROCEDURE — G8536 NO DOC ELDER MAL SCRN: HCPCS | Performed by: INTERNAL MEDICINE

## 2020-09-23 PROCEDURE — 80061 LIPID PANEL: CPT | Performed by: INTERNAL MEDICINE

## 2020-09-23 PROCEDURE — 1101F PT FALLS ASSESS-DOCD LE1/YR: CPT | Performed by: INTERNAL MEDICINE

## 2020-09-23 PROCEDURE — 80053 COMPREHEN METABOLIC PANEL: CPT | Performed by: INTERNAL MEDICINE

## 2020-09-23 PROCEDURE — G8427 DOCREV CUR MEDS BY ELIG CLIN: HCPCS | Performed by: INTERNAL MEDICINE

## 2020-09-23 PROCEDURE — 99214 OFFICE O/P EST MOD 30 MIN: CPT | Performed by: INTERNAL MEDICINE

## 2020-09-23 RX ORDER — LOSARTAN POTASSIUM 50 MG/1
100 TABLET ORAL DAILY
Qty: 90 TAB | Refills: 3 | OUTPATIENT
Start: 2020-09-23 | End: 2020-10-21 | Stop reason: SDUPTHER

## 2020-09-23 NOTE — PATIENT INSTRUCTIONS
Body Mass Index: Care Instructions Your Care Instructions Body mass index (BMI) can help you see if your weight is raising your risk for health problems. It uses a formula to compare how much you weigh with how tall you are. · A BMI lower than 18.5 is considered underweight. · A BMI between 18.5 and 24.9 is considered healthy. · A BMI between 25 and 29.9 is considered overweight. A BMI of 30 or higher is considered obese. If your BMI is in the normal range, it means that you have a lower risk for weight-related health problems. If your BMI is in the overweight or obese range, you may be at increased risk for weight-related health problems, such as high blood pressure, heart disease, stroke, arthritis or joint pain, and diabetes. If your BMI is in the underweight range, you may be at increased risk for health problems such as fatigue, lower protection (immunity) against illness, muscle loss, bone loss, hair loss, and hormone problems. BMI is just one measure of your risk for weight-related health problems. You may be at higher risk for health problems if you are not active, you eat an unhealthy diet, or you drink too much alcohol or use tobacco products. Follow-up care is a key part of your treatment and safety. Be sure to make and go to all appointments, and call your doctor if you are having problems. It's also a good idea to know your test results and keep a list of the medicines you take. How can you care for yourself at home? · Practice healthy eating habits. This includes eating plenty of fruits, vegetables, whole grains, lean protein, and low-fat dairy. · If your doctor recommends it, get more exercise. Walking is a good choice. Bit by bit, increase the amount you walk every day. Try for at least 30 minutes on most days of the week. · Do not smoke. Smoking can increase your risk for health problems.  If you need help quitting, talk to your doctor about stop-smoking programs and medicines. These can increase your chances of quitting for good. · Limit alcohol to 2 drinks a day for men and 1 drink a day for women. Too much alcohol can cause health problems. If you have a BMI higher than 25 · Your doctor may do other tests to check your risk for weight-related health problems. This may include measuring the distance around your waist. A waist measurement of more than 40 inches in men or 35 inches in women can increase the risk of weight-related health problems. · Talk with your doctor about steps you can take to stay healthy or improve your health. You may need to make lifestyle changes to lose weight and stay healthy, such as changing your diet and getting regular exercise. If you have a BMI lower than 18.5 · Your doctor may do other tests to check your risk for health problems. · Talk with your doctor about steps you can take to stay healthy or improve your health. You may need to make lifestyle changes to gain or maintain weight and stay healthy, such as getting more healthy foods in your diet and doing exercises to build muscle. Where can you learn more? Go to http://terry-farhan.info/ Enter S176 in the search box to learn more about \"Body Mass Index: Care Instructions. \" Current as of: December 11, 2019               Content Version: 12.6 © 7408-5670 [employer] [place]. Care instructions adapted under license by [employer] (which disclaims liability or warranty for this information). If you have questions about a medical condition or this instruction, always ask your healthcare professional. [address] any warranty or liability for your use of this information.

## 2020-09-23 NOTE — PROGRESS NOTES
After obtaining written consent and per orders of Dr. Annie Graves, injection of High dose flu vaccine given by Heather Bourgeois RN. Order and injection/medication verified by second nurse/ma review by Dallas Alberto LPN. Patient tolerated procedure well. VIS was given to them. No reactions noted.

## 2020-09-23 NOTE — PROGRESS NOTES
Chief Complaint   Patient presents with    Diabetes     3 month follow up    Hypertension    Cholesterol Problem     Visit Vitals  BP (!) 174/90 (BP 1 Location: Left arm, BP Patient Position: Sitting)   Pulse 69   Temp 97.7 °F (36.5 °C) (Oral)   Resp 19   Ht 5' 7\" (1.702 m)   Wt 228 lb 12.8 oz (103.8 kg)   SpO2 95%   BMI 35.84 kg/m²     1. Have you been to the ER, urgent care clinic since your last visit? Hospitalized since your last visit? No    2. Have you seen or consulted any other health care providers outside of the [address] [place] [address] since your last visit? Include any pap smears or colon screening.  No

## 2020-10-21 ENCOUNTER — OFFICE VISIT (OUTPATIENT)
Dept: INTERNAL MEDICINE CLINIC | Age: 85
End: 2020-10-21
Payer: MEDICARE

## 2020-10-21 VITALS
RESPIRATION RATE: 16 BRPM | HEART RATE: 70 BPM | TEMPERATURE: 97.3 F | BODY MASS INDEX: 36 KG/M2 | WEIGHT: 229.4 LBS | OXYGEN SATURATION: 96 % | HEIGHT: 67 IN | SYSTOLIC BLOOD PRESSURE: 138 MMHG | DIASTOLIC BLOOD PRESSURE: 66 MMHG

## 2020-10-21 DIAGNOSIS — I12.9 HYPERTENSIVE KIDNEY DISEASE WITH CHRONIC KIDNEY DISEASE STAGE III (HCC): ICD-10-CM

## 2020-10-21 DIAGNOSIS — N18.30 HYPERTENSIVE KIDNEY DISEASE WITH STAGE 3 CHRONIC KIDNEY DISEASE, UNSPECIFIED WHETHER STAGE 3A OR 3B CKD (HCC): Primary | ICD-10-CM

## 2020-10-21 DIAGNOSIS — N18.30 HYPERTENSIVE KIDNEY DISEASE WITH CHRONIC KIDNEY DISEASE STAGE III (HCC): ICD-10-CM

## 2020-10-21 DIAGNOSIS — I12.9 HYPERTENSIVE KIDNEY DISEASE WITH STAGE 3 CHRONIC KIDNEY DISEASE, UNSPECIFIED WHETHER STAGE 3A OR 3B CKD (HCC): Primary | ICD-10-CM

## 2020-10-21 DIAGNOSIS — E66.01 SEVERE OBESITY (BMI 35.0-39.9) WITH COMORBIDITY (HCC): ICD-10-CM

## 2020-10-21 PROCEDURE — 1101F PT FALLS ASSESS-DOCD LE1/YR: CPT | Performed by: INTERNAL MEDICINE

## 2020-10-21 PROCEDURE — G8752 SYS BP LESS 140: HCPCS | Performed by: INTERNAL MEDICINE

## 2020-10-21 PROCEDURE — G8427 DOCREV CUR MEDS BY ELIG CLIN: HCPCS | Performed by: INTERNAL MEDICINE

## 2020-10-21 PROCEDURE — 99213 OFFICE O/P EST LOW 20 MIN: CPT | Performed by: INTERNAL MEDICINE

## 2020-10-21 PROCEDURE — G8754 DIAS BP LESS 90: HCPCS | Performed by: INTERNAL MEDICINE

## 2020-10-21 PROCEDURE — G8536 NO DOC ELDER MAL SCRN: HCPCS | Performed by: INTERNAL MEDICINE

## 2020-10-21 PROCEDURE — G8510 SCR DEP NEG, NO PLAN REQD: HCPCS | Performed by: INTERNAL MEDICINE

## 2020-10-21 PROCEDURE — G8417 CALC BMI ABV UP PARAM F/U: HCPCS | Performed by: INTERNAL MEDICINE

## 2020-10-21 RX ORDER — LOSARTAN POTASSIUM 100 MG/1
100 TABLET ORAL DAILY
Qty: 90 TAB | Refills: 3 | Status: SHIPPED | OUTPATIENT
Start: 2020-10-21 | End: 2021-11-23

## 2020-10-21 NOTE — PROGRESS NOTES
Chief Complaint   Patient presents with    Hypertension     1 month f/up       SUBJECTIVE:    Tata Dumas is a 80 y.o. male who returns in follow-up of his hypertension after recently been seen at which time his blood pressure was markedly elevated and thus we increase his losartan from 50 mg daily to 100 mg daily. He has been taken to the 50s so far and has been tolerating okay. He notes no dizziness, lightheadedness or other cardiorespiratory complaints. Notes no GI or  complaints and no other complaints on complete review of systems. Current Outpatient Medications   Medication Sig Dispense Refill    losartan (COZAAR) 100 mg tablet Take 1 Tab by mouth daily. 90 Tab 3    glimepiride (AMARYL) 4 mg tablet TAKE 1 TABLET BY MOUTH IN THE MORNING 90 Tab 1    lisinopriL (PRINIVIL, ZESTRIL) 40 mg tablet Take 1 tablet by mouth once daily 90 Tab 1    metFORMIN ER (GLUCOPHAGE XR) 500 mg tablet Take 2 tablets by mouth twice daily 360 Tab 3    ezetimibe (ZETIA) 10 mg tablet TAKE 1 TABLET BY MOUTH ONCE DAILY AT BEDTIME 90 Tab 3    amLODIPine (NORVASC) 5 mg tablet TAKE 1 TABLET BY MOUTH ONCE DAILY 90 Tab 3    simvastatin (ZOCOR) 20 mg tablet TAKE 1 TABLET BY MOUTH ONCE DAILY AT  NIGHT 90 Tab 3    JANUVIA 100 mg tablet TAKE ONE TABLET BY MOUTH ONCE DAILY 90 Tab 3    aspirin delayed-release 81 mg tablet Take  by mouth daily.  Omega-3-DHA-EPA-Fish Oil 1,000 mg (120 mg-180 mg) cap Take 1,500 mg by mouth daily.  nicotinic acid (NIACIN) 500 mg tablet Take 500 mg by mouth Daily (before breakfast).        Past Medical History:   Diagnosis Date    Cancer [facility])     prostate    Cardiac arrhythmia 7/20/2017    Colon cancer screening 7/20/2017    Diabetes ([facility] [address]     Type II    Diabetes ([facility] [address]     DJD (degenerative joint disease) 7/20/2017    Former smoker 7/20/2017    Hypercholesterolemia     Hyperlipidemia 7/20/2017    Hypertension     Hypertension with renal disease 7/20/2017    On statin therapy 2017    Polymyalgia rheumatica ([facility] [address] 2017     Past Surgical History:   Procedure Laterality Date    HX PROSTATECTOMY      1969    HX PROSTATECTOMY       No Known Allergies    REVIEW OF SYSTEMS:  General: negative for - chills or fever, or weight loss or gain  ENT: negative for - headaches, nasal congestion or tinnitus  Eyes: no blurred or visual changes  Neck: No stiffness or swollen nodes  Respiratory: negative for - cough, hemoptysis, shortness of breath or wheezing  Cardiovascular : negative for - chest pain, edema, palpitations or shortness of breath  Gastrointestinal: negative for - abdominal pain, blood in stools, heartburn or nausea/vomiting  Genito-Urinary: no dysuria, trouble voiding, or hematuria  Musculoskeletal: negative for - gait disturbance, joint pain, joint stiffness or joint swelling  Neurological: no TIA or stroke symptoms  Hematologic: no bruises, no bleeding  Lymphatic: no swollen glands  Integument: no lumps, mole changes, nail changes or rash  Endocrine:no malaise/lethargy poly uria or polydipsia or unexpected weight changes        Social History     Socioeconomic History    Marital status:      Spouse name: Not on file    Number of children: Not on file    Years of education: Not on file    Highest education level: Not on file   Tobacco Use    Smoking status: Former Smoker     Packs/day: 1.00     Years: 50.00     Pack years: 50.00     Types: Cigarettes     Start date: 1955     Last attempt to quit: 2008     Years since quittin.2    Smokeless tobacco: Never Used   Substance and Sexual Activity    Alcohol use: No    Drug use: No    Sexual activity: Never   Social History Narrative    ** Merged History Encounter **          Family History   Problem Relation Age of Onset    Heart Disease Mother     Cancer Father        OBJECTIVE:     Visit Vitals  /66   Pulse 70   Temp 97.3 °F (36.3 °C)   Resp 16   Ht 5' 7\" (1.702 m)   Wt 229 lb 6.4 oz (104.1 kg)   SpO2 96%   BMI 35.93 kg/m²     CONSTITUTIONAL:   well nourished, appears age appropriate  EYES: sclera anicteric, PERRL, EOMI  ENMT:nares clear, moist mucous membranes, pharynx clear  NECK: supple. Thyroid normal, No JVD or bruits  RESPIRATORY: Chest: clear to ascultation and percussion, normal inspiratory effort  CARDIOVASCULAR: Heart: regular rate and rhythm no murmurs, rubs or gallops, PMI not displaced, No thrills, no peripheral edema  GASTROINTESTINAL: Abdomen: non distended, soft, non tender, bowel sounds normal  HEMATOLOGIC: no purpura, petechiae or bruising  LYMPHATIC: No lymph node enlargemant  MUSCULOSKELETAL: Extremities: no active synovitis, pulse 1+   INTEGUMENT: No unusual rashes or suspicious skin lesions noted. Nails appear normal.  PERIPHERAL VASCULAR: normal pulses femoral, PT and DP  NEUROLOGIC: non-focal exam, A & O X 3  PSYCHIATRIC:, appropriate affect     ASSESSMENT:   1. Hypertensive kidney disease with stage 3 chronic kidney disease, unspecified whether stage 3a or 3b CKD    2. Severe obesity (BMI 35.0-39. 9) with comorbidity ([facility] [address]    3. Hypertensive kidney disease with chronic kidney disease stage III      Impression  1. Hypertension that is now controlled so continue losartan 100 mg daily along with his lisinopril 40 mg daily, Norvasc 5 mg daily and diet  2. Obesity weight today is unfortunately up half a pound I encouraged him to work on getting that down  I will recheck a myself again at his prior schedule appointment in December or sooner should there be a problem. PLAN:  .  Orders Placed This Encounter    losartan (COZAAR) 100 mg tablet         ATTENTION:   This medical record was transcribed using an electronic medical records system. Although proofread, it may and can contain electronic and spelling errors. Other human spelling and other errors may be present. Corrections may be executed at a later time.   Please feel free to contact us for any clarifications as needed. Follow-up and Dispositions    · Return in about 2 months (around 12/21/2020). No results found for any visits on 10/21/20. Casper Rodgers MD    The patient verbalized understanding of the problems and plans as explained.

## 2020-10-21 NOTE — PATIENT INSTRUCTIONS
Body Mass Index: Care Instructions Your Care Instructions Body mass index (BMI) can help you see if your weight is raising your risk for health problems. It uses a formula to compare how much you weigh with how tall you are. · A BMI lower than 18.5 is considered underweight. · A BMI between 18.5 and 24.9 is considered healthy. · A BMI between 25 and 29.9 is considered overweight. A BMI of 30 or higher is considered obese. If your BMI is in the normal range, it means that you have a lower risk for weight-related health problems. If your BMI is in the overweight or obese range, you may be at increased risk for weight-related health problems, such as high blood pressure, heart disease, stroke, arthritis or joint pain, and diabetes. If your BMI is in the underweight range, you may be at increased risk for health problems such as fatigue, lower protection (immunity) against illness, muscle loss, bone loss, hair loss, and hormone problems. BMI is just one measure of your risk for weight-related health problems. You may be at higher risk for health problems if you are not active, you eat an unhealthy diet, or you drink too much alcohol or use tobacco products. Follow-up care is a key part of your treatment and safety. Be sure to make and go to all appointments, and call your doctor if you are having problems. It's also a good idea to know your test results and keep a list of the medicines you take. How can you care for yourself at home? · Practice healthy eating habits. This includes eating plenty of fruits, vegetables, whole grains, lean protein, and low-fat dairy. · If your doctor recommends it, get more exercise. Walking is a good choice. Bit by bit, increase the amount you walk every day. Try for at least 30 minutes on most days of the week. · Do not smoke. Smoking can increase your risk for health problems.  If you need help quitting, talk to your doctor about stop-smoking programs and medicines. These can increase your chances of quitting for good. · Limit alcohol to 2 drinks a day for men and 1 drink a day for women. Too much alcohol can cause health problems. If you have a BMI higher than 25 · Your doctor may do other tests to check your risk for weight-related health problems. This may include measuring the distance around your waist. A waist measurement of more than 40 inches in men or 35 inches in women can increase the risk of weight-related health problems. · Talk with your doctor about steps you can take to stay healthy or improve your health. You may need to make lifestyle changes to lose weight and stay healthy, such as changing your diet and getting regular exercise. If you have a BMI lower than 18.5 · Your doctor may do other tests to check your risk for health problems. · Talk with your doctor about steps you can take to stay healthy or improve your health. You may need to make lifestyle changes to gain or maintain weight and stay healthy, such as getting more healthy foods in your diet and doing exercises to build muscle. Where can you learn more? Go to http://www.lopez.com/ Enter S176 in the search box to learn more about \"Body Mass Index: Care Instructions. \" Current as of: December 11, 2019               Content Version: 12.6 © 7103-7051 [employer]. Care instructions adapted under license by [employer] (which disclaims liability or warranty for this information). If you have questions about a medical condition or this instruction, always ask your healthcare professional. [address] any warranty or liability for your use of this information.

## 2020-10-21 NOTE — PROGRESS NOTES
Chief Complaint   Patient presents with    Hypertension     1 month f/up     1. Have you been to the ER, urgent care clinic since your last visit? Hospitalized since your last visit? No    2. Have you seen or consulted any other health care providers outside of the [address] [place] [address] since your last visit? Include any pap smears or colon screening.  No

## 2020-12-02 DIAGNOSIS — E78.5 HYPERLIPIDEMIA, UNSPECIFIED HYPERLIPIDEMIA TYPE: ICD-10-CM

## 2020-12-02 DIAGNOSIS — E78.2 MIXED HYPERLIPIDEMIA: ICD-10-CM

## 2020-12-02 DIAGNOSIS — I10 ESSENTIAL HYPERTENSION: ICD-10-CM

## 2020-12-02 RX ORDER — EZETIMIBE 10 MG/1
TABLET ORAL
Qty: 90 TAB | Refills: 3 | Status: SHIPPED | OUTPATIENT
Start: 2020-12-02 | End: 2021-12-02

## 2020-12-02 RX ORDER — AMLODIPINE BESYLATE 5 MG/1
TABLET ORAL
Qty: 90 TAB | Refills: 3 | Status: SHIPPED | OUTPATIENT
Start: 2020-12-02 | End: 2021-12-02

## 2020-12-02 RX ORDER — LISINOPRIL 40 MG/1
TABLET ORAL
Qty: 90 TAB | Refills: 3 | Status: SHIPPED | OUTPATIENT
Start: 2020-12-02 | End: 2021-12-02

## 2020-12-02 RX ORDER — SIMVASTATIN 20 MG/1
TABLET, FILM COATED ORAL
Qty: 90 TAB | Refills: 3 | Status: SHIPPED | OUTPATIENT
Start: 2020-12-02 | End: 2021-12-02

## 2020-12-02 RX ORDER — GLIMEPIRIDE 4 MG/1
TABLET ORAL
Qty: 90 TAB | Refills: 3 | Status: SHIPPED | OUTPATIENT
Start: 2020-12-02 | End: 2021-12-02

## 2020-12-02 NOTE — TELEPHONE ENCOUNTER
RX refill request from the patient/pharmacy. Patient last seen 10- with labs, and next appt. scheduled for 12-  Requested Prescriptions     Pending Prescriptions Disp Refills    lisinopriL (PRINIVIL, ZESTRIL) 40 mg tablet [Pharmacy Med Name: Lisinopril 40 MG Oral Tablet] 90 Tab 3     Sig: Take 1 tablet by mouth once daily    glimepiride (AMARYL) 4 mg tablet [Pharmacy Med Name: Glimepiride 4 MG Oral Tablet] 90 Tab 3     Sig: TAKE 1 TABLET BY MOUTH IN THE MORNING    amLODIPine (NORVASC) 5 mg tablet [Pharmacy Med Name: amLODIPine Besylate 5 MG Oral Tablet] 90 Tab 3     Sig: Take 1 tablet by mouth once daily    ezetimibe (ZETIA) 10 mg tablet [Pharmacy Med Name: Ezetimibe 10 MG Oral Tablet] 90 Tab 3     Sig: TAKE 1 TABLET BY MOUTH ONCE DAILY AT BEDTIME    simvastatin (ZOCOR) 20 mg tablet [Pharmacy Med Name: Simvastatin 20 MG Oral Tablet] 90 Tab 3     Sig: TAKE 1 TABLET BY MOUTH ONCE DAILY AT NIGHT   .

## 2020-12-22 NOTE — PROGRESS NOTES
This is a Subsequent Medicare Annual Wellness Visit providing Personalized Prevention Plan Services (PPPS) (Performed 12 months after initial AWV and PPPS )    I have reviewed the patient's medical history in detail and updated the computerized patient record. He presents today for his Medicare subsequent annual wellness examination and screening questionnaire. He is also in follow-up of his multiple medical problems include hypertension, diabetes, hyperlipidemia, cardiac arrhythmia, prostate cancer post prostatectomy years ago, DJD, CKD stage III, history of polymyalgia rheumatica now in remission, obesity, prior smoking history and other multiple medical problems. He is taking his medications and trying to follow his diet and remain physically active. He currently denies any chest pain, shortness of breath, palpitations, PND, orthopnea or other cardiac or respiratory complaints. He notes no current GI or  complaints. He notes no headaches, dizziness or neurologic complaints. He does have some chronic arthritic complaints but has no other complaints on complete review of systems.     History     Past Medical History:   Diagnosis Date    Cancer [facility])     prostate    Cardiac arrhythmia 2017    Colon cancer screening 2017    Diabetes ([facility]     Type II    Diabetes ([facility]     DJD (degenerative joint disease) 2017    Former smoker 2017    Hypercholesterolemia     Hyperlipidemia 2017    Hypertension     Hypertension with renal disease 2017    On statin therapy 2017    Polymyalgia rheumatica ([facility] 2017      Past Surgical History:   Procedure Laterality Date    HX PROSTATECTOMY          HX PROSTATECTOMY       Social History     Tobacco Use    Smoking status: Former Smoker     Packs/day: 1.00     Years: 50.00     Pack years: 50.00     Types: Cigarettes     Start date: 1955     Quit date: 2008     Years since quittin.4    Smokeless tobacco: Never Used   Substance Use Topics    Alcohol use: No    Drug use: No     Current Outpatient Medications   Medication Sig Dispense Refill    lisinopriL (PRINIVIL, ZESTRIL) 40 mg tablet Take 1 tablet by mouth once daily 90 Tab 3    glimepiride (AMARYL) 4 mg tablet TAKE 1 TABLET BY MOUTH IN THE MORNING 90 Tab 3    amLODIPine (NORVASC) 5 mg tablet Take 1 tablet by mouth once daily 90 Tab 3    ezetimibe (ZETIA) 10 mg tablet TAKE 1 TABLET BY MOUTH ONCE DAILY AT BEDTIME 90 Tab 3    simvastatin (ZOCOR) 20 mg tablet TAKE 1 TABLET BY MOUTH ONCE DAILY AT NIGHT 90 Tab 3    losartan (COZAAR) 100 mg tablet Take 1 Tab by mouth daily. 90 Tab 3    metFORMIN ER (GLUCOPHAGE XR) 500 mg tablet Take 2 tablets by mouth twice daily 360 Tab 3    JANUVIA 100 mg tablet TAKE ONE TABLET BY MOUTH ONCE DAILY 90 Tab 3    aspirin delayed-release 81 mg tablet Take  by mouth daily.  Omega-3-DHA-EPA-Fish Oil 1,000 mg (120 mg-180 mg) cap Take 1,500 mg by mouth daily.  nicotinic acid (NIACIN) 500 mg tablet Take 500 mg by mouth Daily (before breakfast). No Known Allergies  Family History   Problem Relation Age of Onset    Heart Disease Mother     Cancer Father        Patient Active Problem List    Diagnosis    Severe obesity (BMI 35.0-39. 9) with comorbidity ([facility]    Hypertensive kidney disease with chronic kidney disease stage III    Controlled type 2 diabetes mellitus with stage 3 chronic kidney disease, without long-term current use of insulin ([facility])    Mixed hyperlipidemia    Chronic kidney disease, stage 3    Primary osteoarthritis involving multiple joints    Polymyalgia rheumatica (HCC)    CKD (chronic kidney disease) stage 3, GFR 30-59 ml/min ([facility])    Alcohol screening    Acute non-recurrent maxillary sinusitis    Medicare annual wellness visit, subsequent    Prostate cancer ([facility]     S/P Prostatectomy        Former smoker    Cardiac arrhythmia       Patient Care Team:  Dallas Collins MD as PCP - General (Internal Medicine)  Brian Brandt MD as PCP - [facility] Empaneled Provider    Depression Risk Factor Screening:     3 most recent PHQ Screens 12/23/2020   Little interest or pleasure in doing things Not at all   Feeling down, depressed, irritable, or hopeless Not at all   Total Score PHQ 2 0     Alcohol Risk Factor Screening: You do not drink alcohol or very rarely. Functional Ability and Level of Safety:     Fall Risk     Fall Risk Assessment, last 12 mths 12/23/2020   Able to walk? Yes   Fall in past 12 months? (No Data)       Hearing Loss   mild    Activities of Daily Living   Self-care. ADL Assessment 6/12/2019   Feeding yourself No Help Needed   Getting from bed to chair No Help Needed   Getting dressed No Help Needed   Bathing or showering No Help Needed   Walk across the room (includes cane/walker) No Help Needed   Using the telphone No Help Needed   Taking your medications No Help Needed   Preparing meals No Help Needed   Managing money (expenses/bills) No Help Needed   Moderately strenuous housework (laundry) No Help Needed   Shopping for personal items (toiletries/medicines) No Help Needed   Shopping for groceries No Help Needed   Driving No Help Needed   Climbing a flight of stairs No Help Needed   Getting to places beyond walking distances No Help Needed       Abuse Screen   Patient is not abused    Social History     Social History Narrative    ** Merged History Encounter **            Review of Systems      ROS:    Constitutional: He denies fevers, weight loss, sweats. Eyes: No blurred or double vision. ENT: No difficulty with swallowing, taste, speech or smell. Neck: no stiffness or swelling  Respiratory: No cough wheezing or shortness of breath. Cardiovascular: Denies chest pain, palpitations, unexplained indigestion or syncope. Gastrointestinal:  No changes in bowel movements, no abdominal pain, no bloating.   Genitourinary:  He denies frequency, nocturia or stranguria. Extremities: No change of his chronic joint pain, stiffness or swelling. Neurological:  No numbness, tingling, burring paresthesias or loss of motor strength. No syncope, dizziness or frequent headache  Lymphatic: no adenopathy noted  Hematologic: no easy bruising or bleeding gums  Skin:  No recent rashes or mole changes. Psychiatric/Behavioral:  Negative for depression. Physical Examination     Evaluation of Cognitive Function:  Mood/affect:  happy  Appearance: age appropriate  Family member/caregiver input: none    Visit Vitals  BP (!) 164/78   Pulse 62   Temp 98.5 °F (36.9 °C)   Resp 16   Ht 5' 7\" (1.702 m)   Wt 228 lb (103.4 kg)   SpO2 98%   BMI 35.71 kg/m²     Vitals:    12/23/20 1020 12/23/20 1023 12/23/20 1053   BP: (!) 172/73 (!) 162/80 (!) 164/78   Pulse: 62     Resp: 16     Temp: 98.5 °F (36.9 °C)     SpO2: 98%     Weight: 228 lb (103.4 kg)     Height: 5' 7\" (1.702 m)     PainSc:   0 - No pain          PHYSICAL EXAM:    General appearance - alert, well appearing, and in no distress  Mental status - alert, oriented to person, place, and time  HEENT:  Ears - bilateral TM's and external ear canals clear  Eyes - pupillary responses were normal.  Extraocular muscle function intact. Lids and conjunctiva not injected. Fundoscopic exam revealed sharp disc margins. eye movements intact  Pharynx- clear with teeth in good repair. No masses were noted  Neck - supple without thyromegaly or burit. No JVD noted  Lungs - clear to auscultation and percussion  Cardiac- normal rate, regular rhythm without murmurs. PMI not displaced. No gallop, rub or click  Abdomen - flat, soft, non-tender without palpable organomegaly or mass. No pulsatile mass was felt, and not bruit was heard.   Bowel sounds were active  : Circumcised, Testes descended w/o masses  Rectal: normal sphincter tone, prostate normal, no masses, stool brown and hemacult negative  Extremities -  no clubbing cyanosis or edema  Lymphatics - no palpable lymphadenopathy, no hepatosplenomegaly  Hematologic: no petechiae or purpura  Peripheral vascular -Femoral, Dorsalis pedis and posterior tibial pulses felt without difficulty  Skin - no rash or unusual mole change noted  Neurological - Cranial nerves II-XII grossly intact. Motor strength 5/5. DTR's 2+ and symmetric. Station and gait normal  Back exam - full range of motion, no tenderness, palpable spasm or pain on motion  Musculoskeletal - no joint tenderness, deformity or swelling        Results for orders placed or performed in visit on 83/75/57   METABOLIC PANEL, COMPREHENSIVE   Result Value Ref Range    Glucose 123 (H) 75 - 110 mg/dL    BUN 23.0 (H) 9.0 - 20.0 mg/dL    Creatinine 1.5 0.8 - 1.5 mg/dL    Sodium 145 137 - 145 mmol/L    Potassium 4.7 3.6 - 5.0 mmol/L    Chloride 107 98 - 107 mmol/L    CO2 25.0 22.0 - 32.0 mmol/L    Calcium 11.0 (H) 8.4 - 10.2 mg/dl    Protein, total 7.6 6.3 - 8.2 g/dL    Albumin 4.7 3.9 - 5.4 g/dL    ALT (SGPT) 27 0 - 50 U/L    AST (SGOT) 34.0 14.0 - 36.0 U/L    Alk.  phosphatase 56 38 - 126 U/L    Bilirubin, total 1.6 (H) 0.2 - 1.3 mg/dL    BUN/Creatinine ratio 15 Ratio    GFR est AA 54 <60 mL/min/1.73m2    GFR est non-AA 44 <60 mL/min/1.73m2    Globulin 2.90     A-G Ratio 1.6 Ratio    Anion gap 13 mmol/L   LIPID PANEL   Result Value Ref Range    Cholesterol, total 97 0 - 200 mg/dL    Triglyceride 114 0 - 200 mg/dL    HDL Cholesterol 40 35 - 130 mg/dL    VLDL 23 mg/dL    LDL, calculated 34 0 - 130 mg/dL    CHOL/HDL Ratio 2 0 - 4 Ratio    LDL/HDL Ratio 1 Ratio   CK   Result Value Ref Range    .00 (H) 30.00 - 135.00 U/L   HEMOGLOBIN A1C W/O EAG   Result Value Ref Range    Hemoglobin A1c 6.7 (H) 4.0 - 5.7 %       Advice/Referrals/Counseling   Education and counseling provided:  Are appropriate based on today's review and evaluation  End-of-Life planning (with patient's consent)  Pneumococcal Vaccine  Influenza Vaccine  Colorectal cancer screening tests      Assessment/Plan     ASSESSMENT:   1. Hypertensive kidney disease with stage 3 chronic kidney disease, unspecified whether stage 3a or 3b CKD    2. Controlled type 2 diabetes mellitus with stage 3 chronic kidney disease, without long-term current use of insulin ([facility]    3. Mixed hyperlipidemia    4. Stage 3 chronic kidney disease, unspecified whether stage 3a or 3b CKD    5. Primary osteoarthritis involving multiple joints    6. Severe obesity (BMI 35.0-39. 9) with comorbidity ([facility]    7. Polymyalgia rheumatica ([facility]    8. Prostate cancer ([facility]    9. Alcohol screening    10. Medicare annual wellness visit, subsequent    11. Cardiac arrhythmia, unspecified cardiac arrhythmia type      Impression  1. Hypertension blood pressure accelerated and it has been up and down the last few visits so at this point we will continue current medications with addition of chlorthalidone 25 mg and will have to watch renal function closely. I am reluctant to add a beta-blocker with a heart rate of 62 on EKG. 2.  Diabetes repeat status pending a prior lab reviewed now make adjustments if necessary. 3.  Hyperlipidemia prior lab reviewed repeat status pending I will adjust if needed. 4.  CKD stage III repeat status is pending  5. DJD that is stable  6. Obesity I did discuss diet, exercise and weight reduction for overall health benefit and I note his weight is actually down a pound. 7.  History of polymyalgia rheumatica no evidence of recurrence  8. Prostate cancer status post prostatectomy with PSA pending  9. Annual alcohol screening is done he does not drink alcohol at all now. 10.  Cardiac arrhythmia EKG obtained today normal sinus rhythm with rate of 62. No acute process  Medicare subsequent annual wellness examination screening questionnaire was completed today. The results were reviewed with him and his questions were answered. Lifestyle recommendations modifications discussed and made.   I will call with lab results and make further recommendations or adjustments if necessary. Follow-up in 1 month for hypertension in 3 months for other medical problems. PLAN:  .  Orders Placed This Encounter    CBC WITH AUTOMATED DIFF ([facility])    METABOLIC PANEL, COMPREHENSIVE ([facility])    LIPID PANEL ([facility])    CK ([facility])    HEMOGLOBIN A1C W/O EAG ([facility])    T4, FREE ([facility])    TSH 3RD GENERATION ([facility])    PSA SCREENING (SCREENING) ([facility])    URINALYSIS W/O MICRO ([facility])    URINE, MICROALBUMIN, SEMIQUANTITATIVE ([facility])    AMB POC EKG ROUTINE W/ 12 LEADS, INTER & REP         ATTENTION:   This medical record was transcribed using an electronic medical records system. Although proofread, it may and can contain electronic and spelling errors. Other human spelling and other errors may be present. Corrections may be executed at a later time. Please feel free to contact us for any clarifications as needed. Follow-up and Dispositions    · Return in about 4 weeks (around 1/20/2021). Lauri Mcardle, MD    Recommended healthy diet low in carbohydrates, fats, sodium and cholesterol. Recommended regular cardiovascular exercise 3-6 times per week for 30-60 minutes daily. Current Outpatient Medications   Medication Sig Dispense Refill    lisinopriL (PRINIVIL, ZESTRIL) 40 mg tablet Take 1 tablet by mouth once daily 90 Tab 3    glimepiride (AMARYL) 4 mg tablet TAKE 1 TABLET BY MOUTH IN THE MORNING 90 Tab 3    amLODIPine (NORVASC) 5 mg tablet Take 1 tablet by mouth once daily 90 Tab 3    ezetimibe (ZETIA) 10 mg tablet TAKE 1 TABLET BY MOUTH ONCE DAILY AT BEDTIME 90 Tab 3    simvastatin (ZOCOR) 20 mg tablet TAKE 1 TABLET BY MOUTH ONCE DAILY AT NIGHT 90 Tab 3    losartan (COZAAR) 100 mg tablet Take 1 Tab by mouth daily.  90 Tab 3    metFORMIN ER (GLUCOPHAGE XR) 500 mg tablet Take 2 tablets by mouth twice daily 360 Tab 3    JANUVIA 100 mg tablet TAKE ONE TABLET BY MOUTH ONCE DAILY 90 Tab 3    aspirin delayed-release 81 mg tablet Take  by mouth daily.  Omega-3-DHA-EPA-Fish Oil 1,000 mg (120 mg-180 mg) cap Take 1,500 mg by mouth daily.  nicotinic acid (NIACIN) 500 mg tablet Take 500 mg by mouth Daily (before breakfast). No results found for any visits on 12/23/20. Verbal and written instructions (see AVS) provided. Patient expresses understanding of diagnosis and treatment plan.     Manisha June MD

## 2020-12-23 ENCOUNTER — OFFICE VISIT (OUTPATIENT)
Dept: INTERNAL MEDICINE CLINIC | Age: 85
End: 2020-12-23
Payer: MEDICARE

## 2020-12-23 VITALS
RESPIRATION RATE: 16 BRPM | WEIGHT: 228 LBS | TEMPERATURE: 98.5 F | DIASTOLIC BLOOD PRESSURE: 78 MMHG | HEIGHT: 67 IN | OXYGEN SATURATION: 98 % | SYSTOLIC BLOOD PRESSURE: 164 MMHG | BODY MASS INDEX: 35.79 KG/M2 | HEART RATE: 62 BPM

## 2020-12-23 DIAGNOSIS — N18.30 CONTROLLED TYPE 2 DIABETES MELLITUS WITH STAGE 3 CHRONIC KIDNEY DISEASE, WITHOUT LONG-TERM CURRENT USE OF INSULIN (HCC): ICD-10-CM

## 2020-12-23 DIAGNOSIS — E66.01 SEVERE OBESITY (BMI 35.0-39.9) WITH COMORBIDITY (HCC): ICD-10-CM

## 2020-12-23 DIAGNOSIS — I49.9 CARDIAC ARRHYTHMIA, UNSPECIFIED CARDIAC ARRHYTHMIA TYPE: ICD-10-CM

## 2020-12-23 DIAGNOSIS — Z00.00 MEDICARE ANNUAL WELLNESS VISIT, SUBSEQUENT: ICD-10-CM

## 2020-12-23 DIAGNOSIS — E78.2 MIXED HYPERLIPIDEMIA: ICD-10-CM

## 2020-12-23 DIAGNOSIS — I12.9 HYPERTENSIVE KIDNEY DISEASE WITH STAGE 3 CHRONIC KIDNEY DISEASE, UNSPECIFIED WHETHER STAGE 3A OR 3B CKD (HCC): Primary | ICD-10-CM

## 2020-12-23 DIAGNOSIS — C61 PROSTATE CANCER (HCC): ICD-10-CM

## 2020-12-23 DIAGNOSIS — E11.22 CONTROLLED TYPE 2 DIABETES MELLITUS WITH STAGE 3 CHRONIC KIDNEY DISEASE, WITHOUT LONG-TERM CURRENT USE OF INSULIN (HCC): ICD-10-CM

## 2020-12-23 DIAGNOSIS — N18.30 STAGE 3 CHRONIC KIDNEY DISEASE, UNSPECIFIED WHETHER STAGE 3A OR 3B CKD (HCC): ICD-10-CM

## 2020-12-23 DIAGNOSIS — M35.3 POLYMYALGIA RHEUMATICA (HCC): ICD-10-CM

## 2020-12-23 DIAGNOSIS — N18.30 HYPERTENSIVE KIDNEY DISEASE WITH STAGE 3 CHRONIC KIDNEY DISEASE, UNSPECIFIED WHETHER STAGE 3A OR 3B CKD (HCC): Primary | ICD-10-CM

## 2020-12-23 DIAGNOSIS — Z13.39 ALCOHOL SCREENING: ICD-10-CM

## 2020-12-23 DIAGNOSIS — M15.9 PRIMARY OSTEOARTHRITIS INVOLVING MULTIPLE JOINTS: ICD-10-CM

## 2020-12-23 LAB
A-G RATIO,AGRAT: 1.5 RATIO
ALBUMIN SERPL-MCNC: 4.5 G/DL (ref 3.9–5.4)
ALP SERPL-CCNC: 66 U/L (ref 38–126)
ALT SERPL-CCNC: 30 U/L (ref 0–50)
ANION GAP SERPL CALC-SCNC: 13 MMOL/L
AST SERPL W P-5'-P-CCNC: 35 U/L (ref 14–36)
BACTERIA,BACTU: ABNORMAL
BILIRUB SERPL-MCNC: 1.5 MG/DL (ref 0.2–1.3)
BILIRUB UR QL: NEGATIVE
BUN SERPL-MCNC: 27 MG/DL (ref 9–20)
BUN/CREATININE RATIO,BUCR: 17 RATIO
CALCIUM SERPL-MCNC: 10.5 MG/DL (ref 8.4–10.2)
CHLORIDE SERPL-SCNC: 107 MMOL/L (ref 98–107)
CHOL/HDL RATIO,CHHD: 3 RATIO (ref 0–4)
CHOLEST SERPL-MCNC: 98 MG/DL (ref 0–200)
CK SERPL-CCNC: 100 U/L (ref 30–135)
CLARITY: CLEAR
CO2 SERPL-SCNC: 25 MMOL/L (ref 22–32)
COLOR UR: ABNORMAL
CREAT SERPL-MCNC: 1.6 MG/DL (ref 0.8–1.5)
ERYTHROCYTE [DISTWIDTH] IN BLOOD BY AUTOMATED COUNT: 13.3 %
GLOBULIN,GLOB: 3.1
GLUCOSE 24H UR-MRATE: NEGATIVE G/(24.H)
GLUCOSE SERPL-MCNC: 142 MG/DL (ref 75–110)
HBA1C MFR BLD HPLC: 6.5 % (ref 4–5.7)
HCT VFR BLD AUTO: 43.6 % (ref 37–51)
HDLC SERPL-MCNC: 34 MG/DL (ref 35–130)
HGB BLD-MCNC: 13.6 G/DL (ref 12–18)
HGB UR QL STRIP: ABNORMAL
KETONES UR QL STRIP.AUTO: NEGATIVE
LDL/HDL RATIO,LDHD: 1 RATIO
LDLC SERPL CALC-MCNC: 38 MG/DL (ref 0–130)
LEUKOCYTE ESTERASE: NEGATIVE
LYMPHOCYTES ABSOLUTE: 3.4 K/UL (ref 0.6–4.1)
LYMPHOCYTES NFR BLD: 35.9 % (ref 10–58.5)
MCH RBC QN AUTO: 31.1 PG (ref 26–32)
MCHC RBC AUTO-ENTMCNC: 31.2 G/DL (ref 30–36)
MCV RBC AUTO: 99.5 FL (ref 80–97)
MICROALBUMIN, URINE: 100 MG/L (ref 0–20)
MONOCYTES ABS-DIF,2141: 0.9 K/UL (ref 0–1.8)
MONOCYTES NFR BLD: 9.1 % (ref 0.1–24)
NEUTROPHILS # BLD: 55 % (ref 37–92)
NEUTROPHILS ABS,2156: 5.2 K/UL (ref 2–7.8)
NITRITE UR QL STRIP.AUTO: NEGATIVE
PH UR STRIP: 5 [PH] (ref 5–7)
PLATELET # BLD AUTO: 269 K/UL (ref 140–440)
POTASSIUM SERPL-SCNC: 5.4 MMOL/L (ref 3.6–5)
PROT SERPL-MCNC: 7.6 G/DL (ref 6.3–8.2)
PROT UR STRIP-MCNC: ABNORMAL MG/DL
PSA, TEST22: 0.1 NG/ML (ref 0–4)
RBC # BLD AUTO: 4.38 M/UL (ref 4.2–6.3)
RBC #/AREA URNS HPF: ABNORMAL #/HPF
SODIUM SERPL-SCNC: 145 MMOL/L (ref 137–145)
SP GR UR REFRACTOMETRY: 1.02 (ref 1–1.03)
T4 FREE SERPL-MCNC: 1.06 NG/DL (ref 0.58–2.3)
TRIGL SERPL-MCNC: 129 MG/DL (ref 0–200)
TSH SERPL DL<=0.05 MIU/L-ACNC: 1.33 UIU/ML (ref 0.34–5.6)
UROBILINOGEN UR QL STRIP.AUTO: NEGATIVE
VLDLC SERPL CALC-MCNC: 26 MG/DL
WBC # BLD AUTO: 9.4 K/UL (ref 4.1–10.9)
WBC URNS QL MICRO: ABNORMAL #/HPF

## 2020-12-23 PROCEDURE — 85025 COMPLETE CBC W/AUTO DIFF WBC: CPT | Performed by: INTERNAL MEDICINE

## 2020-12-23 PROCEDURE — 80053 COMPREHEN METABOLIC PANEL: CPT | Performed by: INTERNAL MEDICINE

## 2020-12-23 PROCEDURE — G8754 DIAS BP LESS 90: HCPCS | Performed by: INTERNAL MEDICINE

## 2020-12-23 PROCEDURE — G8427 DOCREV CUR MEDS BY ELIG CLIN: HCPCS | Performed by: INTERNAL MEDICINE

## 2020-12-23 PROCEDURE — G8510 SCR DEP NEG, NO PLAN REQD: HCPCS | Performed by: INTERNAL MEDICINE

## 2020-12-23 PROCEDURE — 84439 ASSAY OF FREE THYROXINE: CPT | Performed by: INTERNAL MEDICINE

## 2020-12-23 PROCEDURE — 99215 OFFICE O/P EST HI 40 MIN: CPT | Performed by: INTERNAL MEDICINE

## 2020-12-23 PROCEDURE — G8417 CALC BMI ABV UP PARAM F/U: HCPCS | Performed by: INTERNAL MEDICINE

## 2020-12-23 PROCEDURE — 82044 UR ALBUMIN SEMIQUANTITATIVE: CPT | Performed by: INTERNAL MEDICINE

## 2020-12-23 PROCEDURE — G8753 SYS BP > OR = 140: HCPCS | Performed by: INTERNAL MEDICINE

## 2020-12-23 PROCEDURE — 80061 LIPID PANEL: CPT | Performed by: INTERNAL MEDICINE

## 2020-12-23 PROCEDURE — 93000 ELECTROCARDIOGRAM COMPLETE: CPT | Performed by: INTERNAL MEDICINE

## 2020-12-23 PROCEDURE — 84443 ASSAY THYROID STIM HORMONE: CPT | Performed by: INTERNAL MEDICINE

## 2020-12-23 PROCEDURE — 1101F PT FALLS ASSESS-DOCD LE1/YR: CPT | Performed by: INTERNAL MEDICINE

## 2020-12-23 PROCEDURE — 82550 ASSAY OF CK (CPK): CPT | Performed by: INTERNAL MEDICINE

## 2020-12-23 PROCEDURE — G8536 NO DOC ELDER MAL SCRN: HCPCS | Performed by: INTERNAL MEDICINE

## 2020-12-23 PROCEDURE — 81003 URINALYSIS AUTO W/O SCOPE: CPT | Performed by: INTERNAL MEDICINE

## 2020-12-23 PROCEDURE — 83036 HEMOGLOBIN GLYCOSYLATED A1C: CPT | Performed by: INTERNAL MEDICINE

## 2020-12-23 PROCEDURE — G0439 PPPS, SUBSEQ VISIT: HCPCS | Performed by: INTERNAL MEDICINE

## 2020-12-23 PROCEDURE — G0103 PSA SCREENING: HCPCS | Performed by: INTERNAL MEDICINE

## 2020-12-23 RX ORDER — CHLORTHALIDONE 25 MG/1
25 TABLET ORAL DAILY
Qty: 30 TAB | Status: SHIPPED | OUTPATIENT
Start: 2020-12-23 | End: 2022-02-16

## 2020-12-23 NOTE — PROGRESS NOTES
Teddy Silverman is a 80 y.o. male    Chief Complaint   Patient presents with    Annual Wellness Visit     3 month follow up       Visit Vitals  BP (!) 162/80 (BP 1 Location: Right arm, BP Patient Position: Sitting) Comment (BP Patient Position): manual BP   Pulse 62   Temp 98.5 °F (36.9 °C)   Resp 16   Ht 5' 7\" (1.702 m)   Wt 228 lb (103.4 kg)   SpO2 98%   BMI 35.71 kg/m²           1. Have you been to the ER, urgent care clinic since your last visit? Hospitalized since your last visit? No     2. Have you seen or consulted any other health care providers outside of the [address] [place] [address] since your last visit? Include any pap smears or colon screening.  No

## 2021-01-15 ENCOUNTER — OFFICE VISIT (OUTPATIENT)
Dept: INTERNAL MEDICINE CLINIC | Age: 86
End: 2021-01-15
Payer: MEDICARE

## 2021-01-15 VITALS
TEMPERATURE: 97.5 F | DIASTOLIC BLOOD PRESSURE: 68 MMHG | HEIGHT: 67 IN | HEART RATE: 77 BPM | RESPIRATION RATE: 16 BRPM | WEIGHT: 228.6 LBS | OXYGEN SATURATION: 93 % | SYSTOLIC BLOOD PRESSURE: 136 MMHG | BODY MASS INDEX: 35.88 KG/M2

## 2021-01-15 DIAGNOSIS — N18.30 HYPERTENSIVE KIDNEY DISEASE WITH STAGE 3 CHRONIC KIDNEY DISEASE, UNSPECIFIED WHETHER STAGE 3A OR 3B CKD (HCC): Primary | ICD-10-CM

## 2021-01-15 DIAGNOSIS — E66.01 SEVERE OBESITY (BMI 35.0-39.9) WITH COMORBIDITY (HCC): ICD-10-CM

## 2021-01-15 DIAGNOSIS — N18.30 STAGE 3 CHRONIC KIDNEY DISEASE, UNSPECIFIED WHETHER STAGE 3A OR 3B CKD (HCC): ICD-10-CM

## 2021-01-15 DIAGNOSIS — I12.9 HYPERTENSIVE KIDNEY DISEASE WITH STAGE 3 CHRONIC KIDNEY DISEASE, UNSPECIFIED WHETHER STAGE 3A OR 3B CKD (HCC): Primary | ICD-10-CM

## 2021-01-15 PROCEDURE — 1101F PT FALLS ASSESS-DOCD LE1/YR: CPT | Performed by: INTERNAL MEDICINE

## 2021-01-15 PROCEDURE — 99213 OFFICE O/P EST LOW 20 MIN: CPT | Performed by: INTERNAL MEDICINE

## 2021-01-15 PROCEDURE — G8510 SCR DEP NEG, NO PLAN REQD: HCPCS | Performed by: INTERNAL MEDICINE

## 2021-01-15 PROCEDURE — G8417 CALC BMI ABV UP PARAM F/U: HCPCS | Performed by: INTERNAL MEDICINE

## 2021-01-15 PROCEDURE — G8427 DOCREV CUR MEDS BY ELIG CLIN: HCPCS | Performed by: INTERNAL MEDICINE

## 2021-01-15 PROCEDURE — G8536 NO DOC ELDER MAL SCRN: HCPCS | Performed by: INTERNAL MEDICINE

## 2021-01-15 NOTE — PATIENT INSTRUCTIONS
Body Mass Index: Care Instructions Your Care Instructions Body mass index (BMI) can help you see if your weight is raising your risk for health problems. It uses a formula to compare how much you weigh with how tall you are. · A BMI lower than 18.5 is considered underweight. · A BMI between 18.5 and 24.9 is considered healthy. · A BMI between 25 and 29.9 is considered overweight. A BMI of 30 or higher is considered obese. If your BMI is in the normal range, it means that you have a lower risk for weight-related health problems. If your BMI is in the overweight or obese range, you may be at increased risk for weight-related health problems, such as high blood pressure, heart disease, stroke, arthritis or joint pain, and diabetes. If your BMI is in the underweight range, you may be at increased risk for health problems such as fatigue, lower protection (immunity) against illness, muscle loss, bone loss, hair loss, and hormone problems. BMI is just one measure of your risk for weight-related health problems. You may be at higher risk for health problems if you are not active, you eat an unhealthy diet, or you drink too much alcohol or use tobacco products. Follow-up care is a key part of your treatment and safety. Be sure to make and go to all appointments, and call your doctor if you are having problems. It's also a good idea to know your test results and keep a list of the medicines you take. How can you care for yourself at home? · Practice healthy eating habits. This includes eating plenty of fruits, vegetables, whole grains, lean protein, and low-fat dairy. · If your doctor recommends it, get more exercise. Walking is a good choice. Bit by bit, increase the amount you walk every day. Try for at least 30 minutes on most days of the week. · Do not smoke. Smoking can increase your risk for health problems. If you need help quitting, talk to your doctor about stop-smoking programs and medicines. These can increase your chances of quitting for good. · Limit alcohol to 2 drinks a day for men and 1 drink a day for women. Too much alcohol can cause health problems. If you have a BMI higher than 25 · Your doctor may do other tests to check your risk for weight-related health problems. This may include measuring the distance around your waist. A waist measurement of more than 40 inches in men or 35 inches in women can increase the risk of weight-related health problems. · Talk with your doctor about steps you can take to stay healthy or improve your health. You may need to make lifestyle changes to lose weight and stay healthy, such as changing your diet and getting regular exercise. If you have a BMI lower than 18.5 · Your doctor may do other tests to check your risk for health problems. · Talk with your doctor about steps you can take to stay healthy or improve your health. You may need to make lifestyle changes to gain or maintain weight and stay healthy, such as getting more healthy foods in your diet and doing exercises to build muscle. Where can you learn more? Go to http://www.[employer].com/ Enter S176 in the search box to learn more about \"Body Mass Index: Care Instructions. \" Current as of: December 11, 2019               Content Version: 12.6 © 5918-1924 [employer]. Care instructions adapted under license by [employer] (which disclaims liability or warranty for this information). If you have questions about a medical condition or this instruction, always ask your healthcare professional. [place] [address] any warranty or liability for your use of this information.

## 2021-01-15 NOTE — PROGRESS NOTES
Chief Complaint   Patient presents with    Hypertension     3 week follow up     Visit Vitals  BP (!) 166/76 (BP 1 Location: Left arm, BP Patient Position: Sitting)   Pulse 77   Temp 97.5 °F (36.4 °C) (Oral)   Resp 16   Ht 5' 7\" (1.702 m)   Wt 228 lb 9.6 oz (103.7 kg)   SpO2 93%   BMI 35.80 kg/m²     1. Have you been to the ER, urgent care clinic since your last visit? Hospitalized since your last visit? No    2. Have you seen or consulted any other health care providers outside of the [address] [place] [address] since your last visit? Include any pap smears or colon screening.  No

## 2021-01-15 NOTE — PROGRESS NOTES
Chief Complaint   Patient presents with    Hypertension     3 week follow up       SUBJECTIVE:    Drea Frederick is a 80 y.o. male is in follow-up of his hypertension after recently seen here with significant ovation of blood pressure. He was started that time on high [place] returns today in follow-up and has been tolerating it quite well. He currently denies any chest pain, shortness of breath, palpitations, PND, orthopnea or other cardiac or respiratory complaints. He notes no GI or  complaints. He notes no headaches, dizziness or neurologic complaints. He has no other complaints on complete review of systems. Current Outpatient Medications   Medication Sig Dispense Refill    chlorthalidone (HYGROTON) 25 mg tablet Take 1 Tab by mouth daily. 30 Tab prn    lisinopriL (PRINIVIL, ZESTRIL) 40 mg tablet Take 1 tablet by mouth once daily 90 Tab 3    glimepiride (AMARYL) 4 mg tablet TAKE 1 TABLET BY MOUTH IN THE MORNING 90 Tab 3    amLODIPine (NORVASC) 5 mg tablet Take 1 tablet by mouth once daily 90 Tab 3    ezetimibe (ZETIA) 10 mg tablet TAKE 1 TABLET BY MOUTH ONCE DAILY AT BEDTIME 90 Tab 3    simvastatin (ZOCOR) 20 mg tablet TAKE 1 TABLET BY MOUTH ONCE DAILY AT NIGHT 90 Tab 3    losartan (COZAAR) 100 mg tablet Take 1 Tab by mouth daily. 90 Tab 3    metFORMIN ER (GLUCOPHAGE XR) 500 mg tablet Take 2 tablets by mouth twice daily 360 Tab 3    JANUVIA 100 mg tablet TAKE ONE TABLET BY MOUTH ONCE DAILY 90 Tab 3    aspirin delayed-release 81 mg tablet Take  by mouth daily.  Omega-3-DHA-EPA-Fish Oil 1,000 mg (120 mg-180 mg) cap Take 1,500 mg by mouth daily.  nicotinic acid (NIACIN) 500 mg tablet Take 500 mg by mouth Daily (before breakfast).        Past Medical History:   Diagnosis Date    Cancer [facility])     prostate    Cardiac arrhythmia 7/20/2017    Colon cancer screening 7/20/2017    [facility] [address]     Type II    Diabetes ([facility] [address]     DJD (degenerative joint disease) 7/20/2017    Former smoker 2017    Hypercholesterolemia     Hyperlipidemia 2017    Hypertension     Hypertension with renal disease 2017    On statin therapy 2017    Polymyalgia rheumatica ([facility] [address] 2017     Past Surgical History:   Procedure Laterality Date    HX PROSTATECTOMY      1969    HX PROSTATECTOMY       No Known Allergies    REVIEW OF SYSTEMS:  General: negative for - chills or fever, or weight loss or gain  ENT: negative for - headaches, nasal congestion or tinnitus  Eyes: no blurred or visual changes  Neck: No stiffness or swollen nodes  Respiratory: negative for - cough, hemoptysis, shortness of breath or wheezing  Cardiovascular : negative for - chest pain, edema, palpitations or shortness of breath  Gastrointestinal: negative for - abdominal pain, blood in stools, heartburn or nausea/vomiting  Genito-Urinary: no dysuria, trouble voiding, or hematuria  Musculoskeletal: negative for - gait disturbance, joint pain, joint stiffness or joint swelling  Neurological: no TIA or stroke symptoms  Hematologic: no bruises, no bleeding  Lymphatic: no swollen glands  Integument: no lumps, mole changes, nail changes or rash  Endocrine:no malaise/lethargy poly uria or polydipsia or unexpected weight changes        Social History     Socioeconomic History    Marital status:      Spouse name: Not on file    Number of children: Not on file    Years of education: Not on file    Highest education level: Not on file   Tobacco Use    Smoking status: Former Smoker     Packs/day: 1.00     Years: 50.00     Pack years: 50.00     Types: Cigarettes     Start date: 1955     Quit date: 2008     Years since quittin.4    Smokeless tobacco: Never Used   Substance and Sexual Activity    Alcohol use: No    Drug use: No    Sexual activity: Never   Social History Narrative    ** Merged History Encounter **          Family History   Problem Relation Age of Onset    Heart Disease Mother     Cancer Father        OBJECTIVE:     Visit Vitals  /68   Pulse 77   Temp 97.5 °F (36.4 °C) (Oral)   Resp 16   Ht 5' 7\" (1.702 m)   Wt 228 lb 9.6 oz (103.7 kg)   SpO2 93%   BMI 35.80 kg/m²     CONSTITUTIONAL:   well nourished, appears age appropriate  EYES: sclera anicteric, PERRL, EOMI  ENMT:nares clear, moist mucous membranes, pharynx clear  NECK: supple. Thyroid normal, No JVD or bruits  RESPIRATORY: Chest: clear to ascultation and percussion, normal inspiratory effort  CARDIOVASCULAR: Heart: regular rate and rhythm no murmurs, rubs or gallops, PMI not displaced, No thrills, no peripheral edema  GASTROINTESTINAL: Abdomen: non distended, soft, non tender, bowel sounds normal  HEMATOLOGIC: no purpura, petechiae or bruising  LYMPHATIC: No lymph node enlargemant  MUSCULOSKELETAL: Extremities: no active synovitis, pulse 1+   INTEGUMENT: No unusual rashes or suspicious skin lesions noted. Nails appear normal.  PERIPHERAL VASCULAR: normal pulses femoral, PT and DP  NEUROLOGIC: non-focal exam, A & O X 3  PSYCHIATRIC:, appropriate affect     ASSESSMENT:   1. Hypertensive kidney disease with stage 3 chronic kidney disease, unspecified whether stage 3a or 3b CKD    2. Severe obesity (BMI 35.0-39. 9) with comorbidity ([address]    3. Stage 3 chronic kidney disease, unspecified whether stage 3a or 3b CKD      Impression  1. Hypertension repeat by me is good so continue current medicines. 2.  Obesity weight today is unchanged so continue same encourage diet  3. CKD stage III stable on last check  We will continue current treatment I will recheck him again as previously scheduled in April. I will see him sooner should there be problems. PLAN:  . No orders of the defined types were placed in this encounter. ATTENTION:   This medical record was transcribed using an electronic medical records system. Although proofread, it may and can contain electronic and spelling errors.   Other human spelling and other errors may be present. Corrections may be executed at a later time. Please feel free to contact us for any clarifications as needed. Follow-up and Dispositions    · Return At prior scheduled appointment on April 1. No results found for any visits on 01/15/21. Jackie Nino MD    The patient verbalized understanding of the problems and plans as explained.

## 2021-01-21 PROBLEM — Z00.00 MEDICARE ANNUAL WELLNESS VISIT, SUBSEQUENT: Status: RESOLVED | Noted: 2017-10-27 | Resolved: 2021-01-21

## 2021-03-31 PROBLEM — I10 ESSENTIAL HYPERTENSION: Status: ACTIVE | Noted: 2021-03-31

## 2021-03-31 PROBLEM — N18.30 HYPERTENSIVE KIDNEY DISEASE WITH CHRONIC KIDNEY DISEASE STAGE III (HCC): Status: RESOLVED | Noted: 2017-07-20 | Resolved: 2021-03-31

## 2021-03-31 PROBLEM — N18.30 CHRONIC KIDNEY DISEASE, STAGE 3 (HCC): Status: RESOLVED | Noted: 2017-07-20 | Resolved: 2021-03-31

## 2021-03-31 PROBLEM — I12.9 HYPERTENSIVE KIDNEY DISEASE WITH CHRONIC KIDNEY DISEASE STAGE III (HCC): Status: RESOLVED | Noted: 2017-07-20 | Resolved: 2021-03-31

## 2021-03-31 NOTE — PROGRESS NOTES
Chief Complaint   Patient presents with    Diabetes     3 month follow up    Hypertension    Cholesterol Problem       SUBJECTIVE:    Anika Hagen is a 80 y.o. male who returns in follow-up for his medical problems include hypertension, diabetes, hyperlipidemia, CKD stage III, obesity, DJD, history of polymyalgia rheumatica and other medical problems. He is taking his medications and trying to follow his diet and remain physically active. He currently denies any chest pain, shortness of breath, palpitations, PND, orthopnea or other cardiac or respiratory complaints. He notes no GI or  complaints. He notes no headaches, dizziness or neurologic complaints. He has no current active arthritic complaints and has no further residual from his polymyalgia rheumatica. The remainder review of systems is negative. Current Outpatient Medications   Medication Sig Dispense Refill    chlorthalidone (HYGROTON) 25 mg tablet Take 1 Tab by mouth daily. 30 Tab prn    lisinopriL (PRINIVIL, ZESTRIL) 40 mg tablet Take 1 tablet by mouth once daily 90 Tab 3    glimepiride (AMARYL) 4 mg tablet TAKE 1 TABLET BY MOUTH IN THE MORNING 90 Tab 3    amLODIPine (NORVASC) 5 mg tablet Take 1 tablet by mouth once daily 90 Tab 3    ezetimibe (ZETIA) 10 mg tablet TAKE 1 TABLET BY MOUTH ONCE DAILY AT BEDTIME 90 Tab 3    simvastatin (ZOCOR) 20 mg tablet TAKE 1 TABLET BY MOUTH ONCE DAILY AT NIGHT 90 Tab 3    losartan (COZAAR) 100 mg tablet Take 1 Tab by mouth daily. 90 Tab 3    metFORMIN ER (GLUCOPHAGE XR) 500 mg tablet Take 2 tablets by mouth twice daily 360 Tab 3    JANUVIA 100 mg tablet TAKE ONE TABLET BY MOUTH ONCE DAILY 90 Tab 3    aspirin delayed-release 81 mg tablet Take  by mouth daily.  Omega-3-DHA-EPA-Fish Oil 1,000 mg (120 mg-180 mg) cap Take 1,500 mg by mouth daily.  nicotinic acid (NIACIN) 500 mg tablet Take 500 mg by mouth Daily (before breakfast).        Past Medical History:   Diagnosis Date    Cancer [facility])     prostate    Cardiac arrhythmia 2017    Colon cancer screening 2017    Diabetes ([facility] [address]     Type II    Diabetes ([facility] [address]     DJD (degenerative joint disease) 2017    Former smoker 2017    Hypercholesterolemia     Hyperlipidemia 2017    Hypertension     Hypertension with renal disease 2017    On statin therapy 2017    Polymyalgia rheumatica ([facility] [address] 2017     Past Surgical History:   Procedure Laterality Date    HX PROSTATECTOMY          HX PROSTATECTOMY       No Known Allergies    REVIEW OF SYSTEMS:  General: negative for - chills or fever, or weight loss or gain  ENT: negative for - headaches, nasal congestion or tinnitus  Eyes: no blurred or visual changes  Neck: No stiffness or swollen nodes  Respiratory: negative for - cough, hemoptysis, shortness of breath or wheezing  Cardiovascular : negative for - chest pain, edema, palpitations or shortness of breath  Gastrointestinal: negative for - abdominal pain, blood in stools, heartburn or nausea/vomiting  Genito-Urinary: no dysuria, trouble voiding, or hematuria  Musculoskeletal: negative for - gait disturbance, joint pain, joint stiffness or joint swelling  Neurological: no TIA or stroke symptoms  Hematologic: no bruises, no bleeding  Lymphatic: no swollen glands  Integument: no lumps, mole changes, nail changes or rash  Endocrine:no malaise/lethargy poly uria or polydipsia or unexpected weight changes        Social History     Socioeconomic History    Marital status:      Spouse name: Not on file    Number of children: Not on file    Years of education: Not on file    Highest education level: Not on file   Tobacco Use    Smoking status: Former Smoker     Packs/day: 1.00     Years: 50.00     Pack years: 50.00     Types: Cigarettes     Start date: 1955     Quit date: 2008     Years since quittin.7    Smokeless tobacco: Never Used   Substance and Sexual Activity    Alcohol use: No    Drug use: No    Sexual activity: Never   Social History Narrative    ** Merged History Encounter **          Family History   Problem Relation Age of Onset    Heart Disease Mother     Cancer Father        OBJECTIVE:     Visit Vitals  /60 (BP 1 Location: Left upper arm, BP Patient Position: Sitting, BP Cuff Size: Adult)   Pulse 68   Temp 97.7 °F (36.5 °C) (Temporal)   Resp 17   Ht 5' 7\" (1.702 m)   Wt 226 lb 4.8 oz (102.6 kg)   SpO2 97%   BMI 35.44 kg/m²     CONSTITUTIONAL:   well nourished, appears age appropriate  EYES: sclera anicteric, PERRL, EOMI  ENMT:nares clear, moist mucous membranes, pharynx clear  NECK: supple. Thyroid normal, No JVD or bruits  RESPIRATORY: Chest: clear to ascultation and percussion, normal inspiratory effort  CARDIOVASCULAR: Heart: regular rate and rhythm no murmurs, rubs or gallops, PMI not displaced, No thrills, no peripheral edema  GASTROINTESTINAL: Abdomen: non distended, soft, non tender, bowel sounds normal  HEMATOLOGIC: no purpura, petechiae or bruising  LYMPHATIC: No lymph node enlargemant  MUSCULOSKELETAL: Extremities: no active synovitis, pulse 1+. No diabetic foot changes noted. INTEGUMENT: No unusual rashes or suspicious skin lesions noted. Nails appear normal.  PERIPHERAL VASCULAR: normal pulses femoral, PT and DP  NEUROLOGIC: non-focal exam, A & O X 3. Normal distal sensation and proprioception all toes both feet. PSYCHIATRIC:, appropriate affect     ASSESSMENT:   1. Essential hypertension    2. Controlled type 2 diabetes mellitus with stage 3 chronic kidney disease, without long-term current use of insulin ([address]    3. Mixed hyperlipidemia    4. Stage 3 chronic kidney disease, unspecified whether stage 3a or 3b CKD ([address]    5. Primary osteoarthritis involving multiple joints    6. Severe obesity (BMI 35.0-39. 9) with comorbidity ([address]    7. Prostate cancer ([address]    8. Polymyalgia rheumatica (HCC)      Impression  1.   Hypertension that is controlled so continue current therapy reviewed with him. 2.  Diabetes repeat status pending and prior lab reviewed now make adjustments if necessary. 3.  Hyperlipidemia prior lab reviewed repeat status pending I will adjust if needed. 4.  CKD stage III repeat status pending  5. DJD that is stable  6. Obesity I did discuss diet, exercise and weight reduction for overall health benefit. 7.  Prostate cancer no evidence of recurrence status post prostatectomy many years ago  6. Polymyalgia rheumatica that seems to be in remission  I will call with lab and make further recommendations or adjustments if necessary. Follow-up scheduled for 3 months or sooner should there be a problem. PLAN:  .  Orders Placed This Encounter    METABOLIC PANEL, COMPREHENSIVE ([facility])    LIPID PANEL ([facility])    CK ([facility])    HEMOGLOBIN A1C W/O EAG ([facility])         ATTENTION:   This medical record was transcribed using an electronic medical records system. Although proofread, it may and can contain electronic and spelling errors. Other human spelling and other errors may be present. Corrections may be executed at a later time. Please feel free to contact us for any clarifications as needed. Follow-up and Dispositions    · Return in about 3 months (around 7/1/2021). No results found for any visits on 04/01/21. Drea Haley MD    The patient verbalized understanding of the problems and plans as explained.

## 2021-04-01 ENCOUNTER — OFFICE VISIT (OUTPATIENT)
Dept: INTERNAL MEDICINE CLINIC | Age: 86
End: 2021-04-01
Payer: MEDICARE

## 2021-04-01 VITALS
WEIGHT: 226.3 LBS | RESPIRATION RATE: 17 BRPM | SYSTOLIC BLOOD PRESSURE: 138 MMHG | HEART RATE: 68 BPM | BODY MASS INDEX: 35.52 KG/M2 | OXYGEN SATURATION: 97 % | TEMPERATURE: 97.7 F | DIASTOLIC BLOOD PRESSURE: 60 MMHG | HEIGHT: 67 IN

## 2021-04-01 DIAGNOSIS — I10 ESSENTIAL HYPERTENSION: Primary | ICD-10-CM

## 2021-04-01 DIAGNOSIS — C61 PROSTATE CANCER (HCC): ICD-10-CM

## 2021-04-01 DIAGNOSIS — N18.30 CONTROLLED TYPE 2 DIABETES MELLITUS WITH STAGE 3 CHRONIC KIDNEY DISEASE, WITHOUT LONG-TERM CURRENT USE OF INSULIN (HCC): ICD-10-CM

## 2021-04-01 DIAGNOSIS — N18.30 STAGE 3 CHRONIC KIDNEY DISEASE, UNSPECIFIED WHETHER STAGE 3A OR 3B CKD (HCC): ICD-10-CM

## 2021-04-01 DIAGNOSIS — E11.22 CONTROLLED TYPE 2 DIABETES MELLITUS WITH STAGE 3 CHRONIC KIDNEY DISEASE, WITHOUT LONG-TERM CURRENT USE OF INSULIN (HCC): ICD-10-CM

## 2021-04-01 DIAGNOSIS — E66.01 SEVERE OBESITY (BMI 35.0-39.9) WITH COMORBIDITY (HCC): ICD-10-CM

## 2021-04-01 DIAGNOSIS — M15.9 PRIMARY OSTEOARTHRITIS INVOLVING MULTIPLE JOINTS: ICD-10-CM

## 2021-04-01 DIAGNOSIS — M35.3 POLYMYALGIA RHEUMATICA (HCC): ICD-10-CM

## 2021-04-01 DIAGNOSIS — E78.2 MIXED HYPERLIPIDEMIA: ICD-10-CM

## 2021-04-01 LAB
A-G RATIO,AGRAT: 1.6 RATIO
ALBUMIN SERPL-MCNC: 4.8 G/DL (ref 3.9–5.4)
ALP SERPL-CCNC: 62 U/L (ref 38–126)
ALT SERPL-CCNC: 22 U/L (ref 0–50)
ANION GAP SERPL CALC-SCNC: 15 MMOL/L
AST SERPL W P-5'-P-CCNC: 31 U/L (ref 14–36)
BILIRUB SERPL-MCNC: 1.1 MG/DL (ref 0.2–1.3)
BUN SERPL-MCNC: 40 MG/DL (ref 9–20)
BUN/CREATININE RATIO,BUCR: 21 RATIO
CALCIUM SERPL-MCNC: 10.6 MG/DL (ref 8.4–10.2)
CHLORIDE SERPL-SCNC: 109 MMOL/L (ref 98–107)
CHOL/HDL RATIO,CHHD: 2 RATIO (ref 0–4)
CHOLEST SERPL-MCNC: 93 MG/DL (ref 0–200)
CK SERPL-CCNC: 126 U/L (ref 30–135)
CO2 SERPL-SCNC: 22 MMOL/L (ref 22–32)
CREAT SERPL-MCNC: 1.9 MG/DL (ref 0.8–1.5)
GLOBULIN,GLOB: 3
GLUCOSE SERPL-MCNC: 120 MG/DL (ref 75–110)
HBA1C MFR BLD HPLC: 6.8 % (ref 4–5.7)
HDLC SERPL-MCNC: 39 MG/DL (ref 35–130)
LDL/HDL RATIO,LDHD: 1 RATIO
LDLC SERPL CALC-MCNC: 30 MG/DL (ref 0–130)
POTASSIUM SERPL-SCNC: 5.3 MMOL/L (ref 3.6–5)
PROT SERPL-MCNC: 7.8 G/DL (ref 6.3–8.2)
SODIUM SERPL-SCNC: 146 MMOL/L (ref 137–145)
TRIGL SERPL-MCNC: 120 MG/DL (ref 0–200)
VLDLC SERPL CALC-MCNC: 24 MG/DL

## 2021-04-01 PROCEDURE — G8536 NO DOC ELDER MAL SCRN: HCPCS | Performed by: INTERNAL MEDICINE

## 2021-04-01 PROCEDURE — G8417 CALC BMI ABV UP PARAM F/U: HCPCS | Performed by: INTERNAL MEDICINE

## 2021-04-01 PROCEDURE — G8754 DIAS BP LESS 90: HCPCS | Performed by: INTERNAL MEDICINE

## 2021-04-01 PROCEDURE — 80061 LIPID PANEL: CPT | Performed by: INTERNAL MEDICINE

## 2021-04-01 PROCEDURE — 99214 OFFICE O/P EST MOD 30 MIN: CPT | Performed by: INTERNAL MEDICINE

## 2021-04-01 PROCEDURE — G8510 SCR DEP NEG, NO PLAN REQD: HCPCS | Performed by: INTERNAL MEDICINE

## 2021-04-01 PROCEDURE — G8427 DOCREV CUR MEDS BY ELIG CLIN: HCPCS | Performed by: INTERNAL MEDICINE

## 2021-04-01 PROCEDURE — 82550 ASSAY OF CK (CPK): CPT | Performed by: INTERNAL MEDICINE

## 2021-04-01 PROCEDURE — 1101F PT FALLS ASSESS-DOCD LE1/YR: CPT | Performed by: INTERNAL MEDICINE

## 2021-04-01 PROCEDURE — G8752 SYS BP LESS 140: HCPCS | Performed by: INTERNAL MEDICINE

## 2021-04-01 PROCEDURE — 83036 HEMOGLOBIN GLYCOSYLATED A1C: CPT | Performed by: INTERNAL MEDICINE

## 2021-04-01 PROCEDURE — 80050 GENERAL HEALTH PANEL: CPT | Performed by: INTERNAL MEDICINE

## 2021-04-01 NOTE — PATIENT INSTRUCTIONS
Body Mass Index: Care Instructions Your Care Instructions Body mass index (BMI) can help you see if your weight is raising your risk for health problems. It uses a formula to compare how much you weigh with how tall you are. · A BMI lower than 18.5 is considered underweight. · A BMI between 18.5 and 24.9 is considered healthy. · A BMI between 25 and 29.9 is considered overweight. A BMI of 30 or higher is considered obese. If your BMI is in the normal range, it means that you have a lower risk for weight-related health problems. If your BMI is in the overweight or obese range, you may be at increased risk for weight-related health problems, such as high blood pressure, heart disease, stroke, arthritis or joint pain, and diabetes. If your BMI is in the underweight range, you may be at increased risk for health problems such as fatigue, lower protection (immunity) against illness, muscle loss, bone loss, hair loss, and hormone problems. BMI is just one measure of your risk for weight-related health problems. You may be at higher risk for health problems if you are not active, you eat an unhealthy diet, or you drink too much alcohol or use tobacco products. Follow-up care is a key part of your treatment and safety. Be sure to make and go to all appointments, and call your doctor if you are having problems. It's also a good idea to know your test results and keep a list of the medicines you take. How can you care for yourself at home? · Practice healthy eating habits. This includes eating plenty of fruits, vegetables, whole grains, lean protein, and low-fat dairy. · If your doctor recommends it, get more exercise. Walking is a good choice. Bit by bit, increase the amount you walk every day. Try for at least 30 minutes on most days of the week. · Do not smoke. Smoking can increase your risk for health problems. If you need help quitting, talk to your doctor about stop-smoking programs and medicines. These can increase your chances of quitting for good. · Limit alcohol to 2 drinks a day for men and 1 drink a day for women. Too much alcohol can cause health problems. If you have a BMI higher than 25 · Your doctor may do other tests to check your risk for weight-related health problems. This may include measuring the distance around your waist. A waist measurement of more than 40 inches in men or 35 inches in women can increase the risk of weight-related health problems. · Talk with your doctor about steps you can take to stay healthy or improve your health. You may need to make lifestyle changes to lose weight and stay healthy, such as changing your diet and getting regular exercise. If you have a BMI lower than 18.5 · Your doctor may do other tests to check your risk for health problems. · Talk with your doctor about steps you can take to stay healthy or improve your health. You may need to make lifestyle changes to gain or maintain weight and stay healthy, such as getting more healthy foods in your diet and doing exercises to build muscle. Where can you learn more? Go to http://www.lopez.com/ Enter S176 in the search box to learn more about \"Body Mass Index: Care Instructions. \" Current as of: September 23, 2020               Content Version: 12.8 © 2006-2021 [employer]. Care instructions adapted under license by [employer] (which disclaims liability or warranty for this information). If you have questions about a medical condition or this instruction, always ask your healthcare professional. [place] [address] any warranty or liability for your use of this information.

## 2021-04-01 NOTE — PROGRESS NOTES
Chief Complaint   Patient presents with    Diabetes     3 month follow up    Hypertension    Cholesterol Problem     Visit Vitals  /60 (BP 1 Location: Left upper arm, BP Patient Position: Sitting, BP Cuff Size: Adult)   Pulse 68   Temp 97.7 °F (36.5 °C) (Temporal)   Resp 17   Ht 5' 7\" (1.702 m)   Wt 226 lb 4.8 oz (102.6 kg)   SpO2 97%   BMI 35.44 kg/m²     1. Have you been to the ER, urgent care clinic since your last visit? Hospitalized since your last visit? No    2. Have you seen or consulted any other health care providers outside of the [address] [place] [address] since your last visit? Include any pap smears or colon screening.  No

## 2021-05-05 RX ORDER — METFORMIN HYDROCHLORIDE 500 MG/1
TABLET, EXTENDED RELEASE ORAL
Qty: 360 TAB | Refills: 3 | Status: SHIPPED | OUTPATIENT
Start: 2021-05-05 | End: 2022-05-10

## 2021-05-05 NOTE — TELEPHONE ENCOUNTER
RX refill request from the patient/pharmacy. Patient last seen 04- with labs, and next appt. scheduled for 06-  Requested Prescriptions     Pending Prescriptions Disp Refills    metFORMIN ER (GLUCOPHAGE XR) 500 mg tablet [Pharmacy Med Name: metFORMIN HCl  MG Oral Tablet Extended Release 24 Hour] 360 Tab 3     Sig: Take 2 tablets by mouth twice daily   .

## 2021-06-24 NOTE — PROGRESS NOTES
Chief Complaint   Patient presents with    Hypertension     3 month follow up    Diabetes    Cholesterol Problem       SUBJECTIVE:    Yoon Valle is a 80 y.o. male returns in follow-up of his medical problems include hypertension, diabetes, hyperlipidemia, DJD, CKD stage III, obesity and other medical problems. He is taking his medications and trying to follow his diet and try and remain physically active. He currently denies any chest pain, shortness of breath, palpitations, PND, orthopnea or other cardiac or respiratory complaints. He notes no GI or  complaints. He notes no headaches, dizziness or neurologic complaints. He has no current active arthritic complaints and there are no other complaints on complete review of systems. Current Outpatient Medications   Medication Sig Dispense Refill    metFORMIN ER (GLUCOPHAGE XR) 500 mg tablet Take 2 tablets by mouth twice daily 360 Tab 3    chlorthalidone (HYGROTON) 25 mg tablet Take 1 Tab by mouth daily. 30 Tab prn    lisinopriL (PRINIVIL, ZESTRIL) 40 mg tablet Take 1 tablet by mouth once daily 90 Tab 3    glimepiride (AMARYL) 4 mg tablet TAKE 1 TABLET BY MOUTH IN THE MORNING 90 Tab 3    amLODIPine (NORVASC) 5 mg tablet Take 1 tablet by mouth once daily 90 Tab 3    ezetimibe (ZETIA) 10 mg tablet TAKE 1 TABLET BY MOUTH ONCE DAILY AT BEDTIME 90 Tab 3    simvastatin (ZOCOR) 20 mg tablet TAKE 1 TABLET BY MOUTH ONCE DAILY AT NIGHT 90 Tab 3    losartan (COZAAR) 100 mg tablet Take 1 Tab by mouth daily. 90 Tab 3    JANUVIA 100 mg tablet TAKE ONE TABLET BY MOUTH ONCE DAILY 90 Tab 3    aspirin delayed-release 81 mg tablet Take  by mouth daily.  Omega-3-DHA-EPA-Fish Oil 1,000 mg (120 mg-180 mg) cap Take 1,500 mg by mouth daily.  nicotinic acid (NIACIN) 500 mg tablet Take 500 mg by mouth Daily (before breakfast).        Past Medical History:   Diagnosis Date    Cancer [facility])     prostate    Cardiac arrhythmia 7/20/2017    Colon cancer screening 2017    Diabetes ([facility]     Type II    Diabetes ([facility]     DJD (degenerative joint disease) 2017    Former smoker 2017    Hypercholesterolemia     Hyperlipidemia 2017    Hypertension     Hypertension with renal disease 2017    On statin therapy 2017    Polymyalgia rheumatica ([facility] 2017     Past Surgical History:   Procedure Laterality Date    HX PROSTATECTOMY      1969    HX PROSTATECTOMY       No Known Allergies    REVIEW OF SYSTEMS:  General: negative for - chills or fever, or weight loss or gain  ENT: negative for - headaches, nasal congestion or tinnitus  Eyes: no blurred or visual changes  Neck: No stiffness or swollen nodes  Respiratory: negative for - cough, hemoptysis, shortness of breath or wheezing  Cardiovascular : negative for - chest pain, edema, palpitations or shortness of breath  Gastrointestinal: negative for - abdominal pain, blood in stools, heartburn or nausea/vomiting  Genito-Urinary: no dysuria, trouble voiding, or hematuria  Musculoskeletal: negative for - gait disturbance, joint pain, joint stiffness or joint swelling  Neurological: no TIA or stroke symptoms  Hematologic: no bruises, no bleeding  Lymphatic: no swollen glands  Integument: no lumps, mole changes, nail changes or rash  Endocrine:no malaise/lethargy poly uria or polydipsia or unexpected weight changes        Social History     Socioeconomic History    Marital status:      Spouse name: Not on file    Number of children: Not on file    Years of education: Not on file    Highest education level: Not on file   Tobacco Use    Smoking status: Former Smoker     Packs/day: 1.00     Years: 50.00     Pack years: 50.00     Types: Cigarettes     Start date: 1955     Quit date: 2008     Years since quittin.9    Smokeless tobacco: Never Used   Vaping Use    Vaping Use: Never used   Substance and Sexual Activity    Alcohol use: No    Drug use: No    Sexual activity: Never   Social History Narrative    ** Merged History Encounter **          Social Determinants of Health     Financial Resource Strain:     Difficulty of Paying Living Expenses:    Food Insecurity:     Worried About Running Out of Food in the Last Year:     [address] in the Last Year:    Transportation Needs:     Lack of Transportation (Medical):  Lack of Transportation (Non-Medical):    Physical Activity:     Days of Exercise per Week:     Minutes of Exercise per Session:    Stress:     Feeling of Stress :    Social Connections:     Frequency of Communication with Friends and Family:     Frequency of Social Gatherings with Friends and Family:     Attends [employer] Services:     Active Member of Clubs or Organizations:     Attends Club or Organization Meetings:     Marital Status:      Family History   Problem Relation Age of Onset    Heart Disease Mother     Cancer Father        OBJECTIVE:     Visit Vitals  /66   Pulse 79   Temp 98.1 °F (36.7 °C) (Temporal)   Resp 17   Ht 5' 7\" (1.702 m)   Wt 226 lb 8 oz (102.7 kg)   SpO2 96%   BMI 35.47 kg/m²     CONSTITUTIONAL:   well nourished, appears age appropriate  EYES: sclera anicteric, PERRL, EOMI  ENMT:nares clear, moist mucous membranes, pharynx clear  NECK: supple. Thyroid normal, No JVD or bruits  RESPIRATORY: Chest: clear to ascultation and percussion, normal inspiratory effort  CARDIOVASCULAR: Heart: regular rate and rhythm no murmurs, rubs or gallops, PMI not displaced, No thrills, no peripheral edema  GASTROINTESTINAL: Abdomen: non distended, soft, non tender, bowel sounds normal  HEMATOLOGIC: no purpura, petechiae or bruising  LYMPHATIC: No lymph node enlargemant  MUSCULOSKELETAL: Extremities: no active synovitis, pulse 1+   INTEGUMENT: No unusual rashes or suspicious skin lesions noted.  Nails appear normal.  PERIPHERAL VASCULAR: normal pulses femoral, PT and DP  NEUROLOGIC: non-focal exam, A & O X 3  PSYCHIATRIC:, appropriate affect     ASSESSMENT:   1. Essential hypertension    2. Controlled type 2 diabetes mellitus with stage 3 chronic kidney disease, without long-term current use of insulin ([address]    3. Mixed hyperlipidemia    4. Stage 3 chronic kidney disease, unspecified whether stage 3a or 3b CKD ([address]    5. Primary osteoarthritis involving multiple joints    6. Severe obesity (BMI 35.0-39. 9) with comorbidity ([address]    7. Severe obesity (BMI 35.0-35.9 with comorbidity) (HCC)      Impression  1. Hypertension that is controlled so continue current therapy reviewed with him. 2.  Diabetes repeat status pending and prior lab review not make adjustments if necessary. 3.  Hyperlipidemia prior lab reviewed repeat status pending I will adjust if needed. 4.  CKD stage III repeat status pending   5. DJD that is stable  6. Obesity we did discuss diet, exercise and weight reduction for overall health benefit. I will call the lab results and make further recommendations or adjustments if necessary. Follow-up in 3 months or sooner if there is a problem. PLAN:  .  Orders Placed This Encounter    METABOLIC PANEL, COMPREHENSIVE    LIPID PANEL    CK    HEMOGLOBIN A1C WITH EAG         ATTENTION:   This medical record was transcribed using an electronic medical records system. Although proofread, it may and can contain electronic and spelling errors. Other human spelling and other errors may be present. Corrections may be executed at a later time. Please feel free to contact us for any clarifications as needed. Follow-up and Dispositions    · Return in about 3 months (around 9/25/2021). No results found for any visits on 06/25/21. Edvin Garcia MD    The patient verbalized understanding of the problems and plans as explained.

## 2021-06-25 ENCOUNTER — OFFICE VISIT (OUTPATIENT)
Dept: INTERNAL MEDICINE CLINIC | Age: 86
End: 2021-06-25
Payer: MEDICARE

## 2021-06-25 VITALS
BODY MASS INDEX: 35.55 KG/M2 | DIASTOLIC BLOOD PRESSURE: 66 MMHG | TEMPERATURE: 98.1 F | WEIGHT: 226.5 LBS | HEIGHT: 67 IN | HEART RATE: 79 BPM | SYSTOLIC BLOOD PRESSURE: 136 MMHG | OXYGEN SATURATION: 96 % | RESPIRATION RATE: 17 BRPM

## 2021-06-25 DIAGNOSIS — E78.2 MIXED HYPERLIPIDEMIA: ICD-10-CM

## 2021-06-25 DIAGNOSIS — I10 ESSENTIAL HYPERTENSION: Primary | ICD-10-CM

## 2021-06-25 DIAGNOSIS — N18.30 CONTROLLED TYPE 2 DIABETES MELLITUS WITH STAGE 3 CHRONIC KIDNEY DISEASE, WITHOUT LONG-TERM CURRENT USE OF INSULIN (HCC): ICD-10-CM

## 2021-06-25 DIAGNOSIS — E11.22 CONTROLLED TYPE 2 DIABETES MELLITUS WITH STAGE 3 CHRONIC KIDNEY DISEASE, WITHOUT LONG-TERM CURRENT USE OF INSULIN (HCC): ICD-10-CM

## 2021-06-25 DIAGNOSIS — E66.01 SEVERE OBESITY (BMI 35.0-39.9) WITH COMORBIDITY (HCC): ICD-10-CM

## 2021-06-25 DIAGNOSIS — M15.9 PRIMARY OSTEOARTHRITIS INVOLVING MULTIPLE JOINTS: ICD-10-CM

## 2021-06-25 DIAGNOSIS — E66.01 SEVERE OBESITY (BMI 35.0-35.9 WITH COMORBIDITY) (HCC): ICD-10-CM

## 2021-06-25 DIAGNOSIS — N18.30 STAGE 3 CHRONIC KIDNEY DISEASE, UNSPECIFIED WHETHER STAGE 3A OR 3B CKD (HCC): ICD-10-CM

## 2021-06-25 PROCEDURE — 1101F PT FALLS ASSESS-DOCD LE1/YR: CPT | Performed by: INTERNAL MEDICINE

## 2021-06-25 PROCEDURE — G8417 CALC BMI ABV UP PARAM F/U: HCPCS | Performed by: INTERNAL MEDICINE

## 2021-06-25 PROCEDURE — 99214 OFFICE O/P EST MOD 30 MIN: CPT | Performed by: INTERNAL MEDICINE

## 2021-06-25 PROCEDURE — G8510 SCR DEP NEG, NO PLAN REQD: HCPCS | Performed by: INTERNAL MEDICINE

## 2021-06-25 PROCEDURE — G8536 NO DOC ELDER MAL SCRN: HCPCS | Performed by: INTERNAL MEDICINE

## 2021-06-25 PROCEDURE — G8427 DOCREV CUR MEDS BY ELIG CLIN: HCPCS | Performed by: INTERNAL MEDICINE

## 2021-06-25 NOTE — PROGRESS NOTES
Chief Complaint   Patient presents with    Hypertension     3 month follow up    Diabetes    Cholesterol Problem     Visit Vitals  BP (!) 162/72 (BP 1 Location: Left upper arm, BP Patient Position: Sitting, BP Cuff Size: Adult)   Pulse 79   Temp 98.1 °F (36.7 °C) (Temporal)   Resp 17   Ht 5' 7\" (1.702 m)   Wt 226 lb 8 oz (102.7 kg)   SpO2 96%   BMI 35.47 kg/m²     1. Have you been to the ER, urgent care clinic since your last visit? Hospitalized since your last visit? No    2. Have you seen or consulted any other health care providers outside of the [address] [place] [address] since your last visit? Include any pap smears or colon screening.  No

## 2021-06-25 NOTE — PATIENT INSTRUCTIONS
Body Mass Index: Care Instructions  Your Care Instructions     Body mass index (BMI) can help you see if your weight is raising your risk for health problems. It uses a formula to compare how much you weigh with how tall you are. · A BMI lower than 18.5 is considered underweight. · A BMI between 18.5 and 24.9 is considered healthy. · A BMI between 25 and 29.9 is considered overweight. A BMI of 30 or higher is considered obese. If your BMI is in the normal range, it means that you have a lower risk for weight-related health problems. If your BMI is in the overweight or obese range, you may be at increased risk for weight-related health problems, such as high blood pressure, heart disease, stroke, arthritis or joint pain, and diabetes. If your BMI is in the underweight range, you may be at increased risk for health problems such as fatigue, lower protection (immunity) against illness, muscle loss, bone loss, hair loss, and hormone problems. BMI is just one measure of your risk for weight-related health problems. You may be at higher risk for health problems if you are not active, you eat an unhealthy diet, or you drink too much alcohol or use tobacco products. Follow-up care is a key part of your treatment and safety. Be sure to make and go to all appointments, and call your doctor if you are having problems. It's also a good idea to know your test results and keep a list of the medicines you take. How can you care for yourself at home? · Practice healthy eating habits. This includes eating plenty of fruits, vegetables, whole grains, lean protein, and low-fat dairy. · If your doctor recommends it, get more exercise. Walking is a good choice. Bit by bit, increase the amount you walk every day. Try for at least 30 minutes on most days of the week. · Do not smoke. Smoking can increase your risk for health problems. If you need help quitting, talk to your doctor about stop-smoking programs and medicines. These can increase your chances of quitting for good. · Limit alcohol to 2 drinks a day for men and 1 drink a day for women. Too much alcohol can cause health problems. If you have a BMI higher than 25  · Your doctor may do other tests to check your risk for weight-related health problems. This may include measuring the distance around your waist. A waist measurement of more than 40 inches in men or 35 inches in women can increase the risk of weight-related health problems. · Talk with your doctor about steps you can take to stay healthy or improve your health. You may need to make lifestyle changes to lose weight and stay healthy, such as changing your diet and getting regular exercise. If you have a BMI lower than 18.5  · Your doctor may do other tests to check your risk for health problems. · Talk with your doctor about steps you can take to stay healthy or improve your health. You may need to make lifestyle changes to gain or maintain weight and stay healthy, such as getting more healthy foods in your diet and doing exercises to build muscle. Where can you learn more? Go to http://www.lopez.com/  Enter S176 in the search box to learn more about \"Body Mass Index: Care Instructions. \"  Current as of: September 23, 2020               Content Version: 12.8  © 2006-2021 [employer]. Care instructions adapted under license by [employer] (which disclaims liability or warranty for this information). If you have questions about a medical condition or this instruction, always ask your healthcare professional. [place] [address] any warranty or liability for your use of this information.

## 2021-06-26 LAB
ALBUMIN SERPL-MCNC: 4.5 G/DL (ref 3.5–5)
ALBUMIN/GLOB SERPL: 1.3 {RATIO} (ref 1.1–2.2)
ALP SERPL-CCNC: 57 U/L (ref 45–117)
ALT SERPL-CCNC: 31 U/L (ref 12–78)
ANION GAP SERPL CALC-SCNC: 7 MMOL/L (ref 5–15)
AST SERPL-CCNC: 35 U/L (ref 15–37)
BILIRUB SERPL-MCNC: 1.3 MG/DL (ref 0.2–1)
BUN SERPL-MCNC: 36 MG/DL (ref 6–20)
BUN/CREAT SERPL: 18 (ref 12–20)
CALCIUM SERPL-MCNC: 10.9 MG/DL (ref 8.5–10.1)
CHLORIDE SERPL-SCNC: 107 MMOL/L (ref 97–108)
CHOLEST SERPL-MCNC: 107 MG/DL
CK SERPL-CCNC: 178 U/L (ref 39–308)
CO2 SERPL-SCNC: 25 MMOL/L (ref 21–32)
CREAT SERPL-MCNC: 1.97 MG/DL (ref 0.7–1.3)
EST. AVERAGE GLUCOSE BLD GHB EST-MCNC: 143 MG/DL
GLOBULIN SER CALC-MCNC: 3.4 G/DL (ref 2–4)
GLUCOSE SERPL-MCNC: 116 MG/DL (ref 65–100)
HBA1C MFR BLD: 6.6 % (ref 4–5.6)
HDLC SERPL-MCNC: 40 MG/DL
HDLC SERPL: 2.7 {RATIO} (ref 0–5)
LDLC SERPL CALC-MCNC: 35.2 MG/DL (ref 0–100)
POTASSIUM SERPL-SCNC: 5.3 MMOL/L (ref 3.5–5.1)
PROT SERPL-MCNC: 7.9 G/DL (ref 6.4–8.2)
SODIUM SERPL-SCNC: 139 MMOL/L (ref 136–145)
TRIGL SERPL-MCNC: 159 MG/DL (ref ?–150)
VLDLC SERPL CALC-MCNC: 31.8 MG/DL

## 2021-09-28 ENCOUNTER — OFFICE VISIT (OUTPATIENT)
Dept: INTERNAL MEDICINE CLINIC | Age: 86
End: 2021-09-28
Payer: MEDICARE

## 2021-09-28 VITALS
BODY MASS INDEX: 35.64 KG/M2 | DIASTOLIC BLOOD PRESSURE: 78 MMHG | TEMPERATURE: 97.6 F | RESPIRATION RATE: 16 BRPM | WEIGHT: 227.1 LBS | HEART RATE: 82 BPM | OXYGEN SATURATION: 96 % | SYSTOLIC BLOOD PRESSURE: 148 MMHG | HEIGHT: 67 IN

## 2021-09-28 DIAGNOSIS — I10 ESSENTIAL HYPERTENSION: Primary | ICD-10-CM

## 2021-09-28 DIAGNOSIS — E11.22 CONTROLLED TYPE 2 DIABETES MELLITUS WITH STAGE 3 CHRONIC KIDNEY DISEASE, WITHOUT LONG-TERM CURRENT USE OF INSULIN (HCC): ICD-10-CM

## 2021-09-28 DIAGNOSIS — Z23 NEEDS FLU SHOT: ICD-10-CM

## 2021-09-28 DIAGNOSIS — E78.2 MIXED HYPERLIPIDEMIA: ICD-10-CM

## 2021-09-28 DIAGNOSIS — N18.30 STAGE 3 CHRONIC KIDNEY DISEASE, UNSPECIFIED WHETHER STAGE 3A OR 3B CKD (HCC): ICD-10-CM

## 2021-09-28 DIAGNOSIS — M15.9 PRIMARY OSTEOARTHRITIS INVOLVING MULTIPLE JOINTS: ICD-10-CM

## 2021-09-28 DIAGNOSIS — E66.01 SEVERE OBESITY (BMI 35.0-39.9) WITH COMORBIDITY (HCC): ICD-10-CM

## 2021-09-28 DIAGNOSIS — N18.30 CONTROLLED TYPE 2 DIABETES MELLITUS WITH STAGE 3 CHRONIC KIDNEY DISEASE, WITHOUT LONG-TERM CURRENT USE OF INSULIN (HCC): ICD-10-CM

## 2021-09-28 PROCEDURE — G8536 NO DOC ELDER MAL SCRN: HCPCS | Performed by: INTERNAL MEDICINE

## 2021-09-28 PROCEDURE — 1101F PT FALLS ASSESS-DOCD LE1/YR: CPT | Performed by: INTERNAL MEDICINE

## 2021-09-28 PROCEDURE — G8417 CALC BMI ABV UP PARAM F/U: HCPCS | Performed by: INTERNAL MEDICINE

## 2021-09-28 PROCEDURE — G8510 SCR DEP NEG, NO PLAN REQD: HCPCS | Performed by: INTERNAL MEDICINE

## 2021-09-28 PROCEDURE — 90694 VACC AIIV4 NO PRSRV 0.5ML IM: CPT | Performed by: INTERNAL MEDICINE

## 2021-09-28 PROCEDURE — G8427 DOCREV CUR MEDS BY ELIG CLIN: HCPCS | Performed by: INTERNAL MEDICINE

## 2021-09-28 PROCEDURE — 99214 OFFICE O/P EST MOD 30 MIN: CPT | Performed by: INTERNAL MEDICINE

## 2021-09-28 PROCEDURE — G0008 ADMIN INFLUENZA VIRUS VAC: HCPCS | Performed by: INTERNAL MEDICINE

## 2021-09-28 NOTE — PROGRESS NOTES
HIPAA verified by two patient identifiers. Jayla Wang is a 80 y.o. male    Chief Complaint   Patient presents with    Diabetes     3 month    Hypertension    Chronic Kidney Disease    Cholesterol Problem       Visit Vitals  BP (!) 148/78 (BP 1 Location: Left upper arm, BP Patient Position: Sitting, BP Cuff Size: Adult)   Pulse 82   Temp 97.6 °F (36.4 °C) (Oral)   Resp 16   Ht 5' 7\" (1.702 m)   Wt 227 lb 1.6 oz (103 kg)   SpO2 96%   BMI 35.57 kg/m²       Pain Scale: /10  Pain Location:       Health Maintenance Due   Topic Date Due    DTaP/Tdap/Td series (1 - Tdap) Never done    Shingrix Vaccine Age 50> (1 of 2) Never done    Eye Exam Retinal or Dilated  02/09/2020    Flu Vaccine (1) 09/01/2021         Coordination of Care Questionnaire:  :   1) Have you been to an emergency room, urgent care, or hospitalized since your last visit? If yes, where when, and reason for visit? no       2. Have seen or consulted any other health care provider since your last visit? If yes, where when, and reason for visit? NO      Patient is accompanied by self I have received verbal consent from Jayla Wang to discuss any/all medical information while they are present in the room.

## 2021-09-28 NOTE — PATIENT INSTRUCTIONS
Body Mass Index: Care Instructions  Your Care Instructions     Body mass index (BMI) can help you see if your weight is raising your risk for health problems. It uses a formula to compare how much you weigh with how tall you are. · A BMI lower than 18.5 is considered underweight. · A BMI between 18.5 and 24.9 is considered healthy. · A BMI between 25 and 29.9 is considered overweight. A BMI of 30 or higher is considered obese. If your BMI is in the normal range, it means that you have a lower risk for weight-related health problems. If your BMI is in the overweight or obese range, you may be at increased risk for weight-related health problems, such as high blood pressure, heart disease, stroke, arthritis or joint pain, and diabetes. If your BMI is in the underweight range, you may be at increased risk for health problems such as fatigue, lower protection (immunity) against illness, muscle loss, bone loss, hair loss, and hormone problems. BMI is just one measure of your risk for weight-related health problems. You may be at higher risk for health problems if you are not active, you eat an unhealthy diet, or you drink too much alcohol or use tobacco products. Follow-up care is a key part of your treatment and safety. Be sure to make and go to all appointments, and call your doctor if you are having problems. It's also a good idea to know your test results and keep a list of the medicines you take. How can you care for yourself at home? · Practice healthy eating habits. This includes eating plenty of fruits, vegetables, whole grains, lean protein, and low-fat dairy. · If your doctor recommends it, get more exercise. Walking is a good choice. Bit by bit, increase the amount you walk every day. Try for at least 30 minutes on most days of the week. · Do not smoke. Smoking can increase your risk for health problems. If you need help quitting, talk to your doctor about stop-smoking programs and medicines. These can increase your chances of quitting for good. · Limit alcohol to 2 drinks a day for men and 1 drink a day for women. Too much alcohol can cause health problems. If you have a BMI higher than 25  · Your doctor may do other tests to check your risk for weight-related health problems. This may include measuring the distance around your waist. A waist measurement of more than 40 inches in men or 35 inches in women can increase the risk of weight-related health problems. · Talk with your doctor about steps you can take to stay healthy or improve your health. You may need to make lifestyle changes to lose weight and stay healthy, such as changing your diet and getting regular exercise. If you have a BMI lower than 18.5  · Your doctor may do other tests to check your risk for health problems. · Talk with your doctor about steps you can take to stay healthy or improve your health. You may need to make lifestyle changes to gain or maintain weight and stay healthy, such as getting more healthy foods in your diet and doing exercises to build muscle. Where can you learn more? Go to http://www.lopez.com/  Enter S176 in the search box to learn more about \"Body Mass Index: Care Instructions. \"  Current as of: March 17, 2021               Content Version: 13.0  © 2006-2021 [employer]. Care instructions adapted under license by [employer] (which disclaims liability or warranty for this information). If you have questions about a medical condition or this instruction, always ask your healthcare professional. [place] [address] any warranty or liability for your use of this information.

## 2021-09-28 NOTE — PROGRESS NOTES
Chief Complaint   Patient presents with    Diabetes     3 month    Hypertension    Chronic Kidney Disease    Cholesterol Problem       SUBJECTIVE:    Victor Manuel Culver is a 80 y.o. male returns in follow-up of his medical problems include hypertension, diabetes, hyperlipidemia, DJD, obesity, CKD stage III and other multiple medical problems. He is taking his medications and trying to follow his diet and remain physically active. He currently denies any chest pain, shortness of breath, palpitations, PND, orthopnea or other cardiac or respiratory complaints. He notes no GI or  complaints. He notes no headaches, dizziness or neurologic complaints. He has no current active arthritic complaints although does have his chronic joint aches and pains. He has no other complaints on complete review of systems. Current Outpatient Medications   Medication Sig Dispense Refill    metFORMIN ER (GLUCOPHAGE XR) 500 mg tablet Take 2 tablets by mouth twice daily 360 Tab 3    chlorthalidone (HYGROTON) 25 mg tablet Take 1 Tab by mouth daily. 30 Tab prn    lisinopriL (PRINIVIL, ZESTRIL) 40 mg tablet Take 1 tablet by mouth once daily 90 Tab 3    glimepiride (AMARYL) 4 mg tablet TAKE 1 TABLET BY MOUTH IN THE MORNING 90 Tab 3    amLODIPine (NORVASC) 5 mg tablet Take 1 tablet by mouth once daily 90 Tab 3    ezetimibe (ZETIA) 10 mg tablet TAKE 1 TABLET BY MOUTH ONCE DAILY AT BEDTIME 90 Tab 3    simvastatin (ZOCOR) 20 mg tablet TAKE 1 TABLET BY MOUTH ONCE DAILY AT NIGHT 90 Tab 3    losartan (COZAAR) 100 mg tablet Take 1 Tab by mouth daily. 90 Tab 3    JANUVIA 100 mg tablet TAKE ONE TABLET BY MOUTH ONCE DAILY 90 Tab 3    aspirin delayed-release 81 mg tablet Take  by mouth daily.  Omega-3-DHA-EPA-Fish Oil 1,000 mg (120 mg-180 mg) cap Take 1,500 mg by mouth daily.  nicotinic acid (NIACIN) 500 mg tablet Take 500 mg by mouth Daily (before breakfast).        Past Medical History:   Diagnosis Date    Cancer [facility]) prostate    Cardiac arrhythmia 2017    Colon cancer screening 2017    Diabetes [facility]     Type II    Diabetes ([facility]     DJD (degenerative joint disease) 2017    Former smoker 2017    Hypercholesterolemia     Hyperlipidemia 2017    Hypertension     Hypertension with renal disease 2017    On statin therapy 2017    Polymyalgia rheumatica ([facility] [facility] 2017     Past Surgical History:   Procedure Laterality Date    HX PROSTATECTOMY      1969    HX PROSTATECTOMY       No Known Allergies    REVIEW OF SYSTEMS:  General: negative for - chills or fever, or weight loss or gain  ENT: negative for - headaches, nasal congestion or tinnitus  Eyes: no blurred or visual changes  Neck: No stiffness or swollen nodes  Respiratory: negative for - cough, hemoptysis, shortness of breath or wheezing  Cardiovascular : negative for - chest pain, edema, palpitations or shortness of breath  Gastrointestinal: negative for - abdominal pain, blood in stools, heartburn or nausea/vomiting  Genito-Urinary: no dysuria, trouble voiding, or hematuria  Musculoskeletal: negative for - gait disturbance, joint pain, joint stiffness or joint swelling  Neurological: no TIA or stroke symptoms  Hematologic: no bruises, no bleeding  Lymphatic: no swollen glands  Integument: no lumps, mole changes, nail changes or rash  Endocrine:no malaise/lethargy poly uria or polydipsia or unexpected weight changes        Social History     Socioeconomic History    Marital status:      Spouse name: Not on file    Number of children: Not on file    Years of education: Not on file    Highest education level: Not on file   Tobacco Use    Smoking status: Former Smoker     Packs/day: 1.00     Years: 50.00     Pack years: 50.00     Types: Cigarettes     Start date: 1955     Quit date: 2008     Years since quittin.2    Smokeless tobacco: Never Used   Vaping Use    Vaping Use: Never used   Substance and Sexual Activity    Alcohol use: No    Drug use: No    Sexual activity: Never   Social History Narrative    ** Merged History Encounter **          Social Determinants of Health     Financial Resource Strain:     Difficulty of Paying Living Expenses:    Food Insecurity:     Worried About Running Out of Food in the Last Year:     [address] in the Last Year:    Transportation Needs:     Lack of Transportation (Medical):  Lack of Transportation (Non-Medical):    Physical Activity:     Days of Exercise per Week:     Minutes of Exercise per Session:    Stress:     Feeling of Stress :    Social Connections:     Frequency of Communication with Friends and Family:     Frequency of Social Gatherings with Friends and Family:     Attends [employer] Services:     Active Member of Clubs or Organizations:     Attends Club or Organization Meetings:     Marital Status:      Family History   Problem Relation Age of Onset    Heart Disease Mother     Cancer Father        OBJECTIVE:     Visit Vitals  BP (!) 148/78 (BP 1 Location: Left upper arm, BP Patient Position: Sitting, BP Cuff Size: Adult)   Pulse 82   Temp 97.6 °F (36.4 °C) (Oral)   Resp 16   Ht 5' 7\" (1.702 m)   Wt 227 lb 1.6 oz (103 kg)   SpO2 96%   BMI 35.57 kg/m²     CONSTITUTIONAL:   well nourished, appears age appropriate  EYES: sclera anicteric, PERRL, EOMI  ENMT:nares clear, moist mucous membranes, pharynx clear  NECK: supple.  Thyroid normal, No JVD or bruits  RESPIRATORY: Chest: clear to ascultation and percussion, normal inspiratory effort  CARDIOVASCULAR: Heart: regular rate and rhythm no murmurs, rubs or gallops, PMI not displaced, No thrills, no peripheral edema  GASTROINTESTINAL: Abdomen: non distended, soft, non tender, bowel sounds normal  HEMATOLOGIC: no purpura, petechiae or bruising  LYMPHATIC: No lymph node enlargemant  MUSCULOSKELETAL: Extremities: no active synovitis, pulse 1+   INTEGUMENT: No unusual rashes or suspicious skin lesions noted. Nails appear normal.  PERIPHERAL VASCULAR: normal pulses femoral, PT and DP  NEUROLOGIC: non-focal exam, A & O X 3  PSYCHIATRIC:, appropriate affect     ASSESSMENT:   1. Essential hypertension    2. Controlled type 2 diabetes mellitus with stage 3 chronic kidney disease, without long-term current use of insulin ([address]    3. Mixed hyperlipidemia    4. Primary osteoarthritis involving multiple joints    5. Stage 3 chronic kidney disease, unspecified whether stage 3a or 3b CKD ([address]    6. Severe obesity (BMI 35.0-39. 9) with comorbidity ([address]    7. Needs flu shot      Impression  1. Hypertension that is controlled so continue current therapy reviewed with him. 2.  Diabetes repeat status pending and prior lab reviewed now make adjustments if necessary. 3.  Hyperlipidemia prior lab reviewed repeat status pending I will adjust if needed. 4. DJD that is stable  5. CKD stage III repeat status pending  6. Obesity I did discuss diet, exercise and weight reduction for overall health benefit. Flu shot given today. I will call the lab and make further recommendations or adjustments if necessary. Follow-up in 3 months or sooner if there is a problem. PLAN:  .  Orders Placed This Encounter    Influenza Vaccine, QUAD, 65 Yrs +  IM  (Fluad 16774 )    METABOLIC PANEL, COMPREHENSIVE    LIPID PANEL    CK    HEMOGLOBIN A1C WITH EAG         ATTENTION:   This medical record was transcribed using an electronic medical records system. Although proofread, it may and can contain electronic and spelling errors. Other human spelling and other errors may be present. Corrections may be executed at a later time. Please feel free to contact us for any clarifications as needed. Follow-up and Dispositions    · Return in about 3 months (around 12/28/2021). No results found for any visits on 09/28/21.     Vernon White MD    The patient verbalized understanding of the problems and plans as explained.

## 2021-09-28 NOTE — PROGRESS NOTES
After obtaining consent and per verbal order from Dr. Chito Boothe, patient received influenza vaccine given by Kiko Aguiar and verified by Chito Perez. Fluad Influenza 0.5ml was given IM in left deltoid. Patient tolerated injection and was observed for 10 minutes post injection. VIS was given.

## 2021-09-29 LAB
ALBUMIN SERPL-MCNC: 4 G/DL (ref 3.5–5)
ALBUMIN/GLOB SERPL: 1.1 {RATIO} (ref 1.1–2.2)
ALP SERPL-CCNC: 54 U/L (ref 45–117)
ALT SERPL-CCNC: 32 U/L (ref 12–78)
ANION GAP SERPL CALC-SCNC: 5 MMOL/L (ref 5–15)
AST SERPL-CCNC: 31 U/L (ref 15–37)
BILIRUB SERPL-MCNC: 1.2 MG/DL (ref 0.2–1)
BUN SERPL-MCNC: 46 MG/DL (ref 6–20)
BUN/CREAT SERPL: 21 (ref 12–20)
CALCIUM SERPL-MCNC: 10.5 MG/DL (ref 8.5–10.1)
CHLORIDE SERPL-SCNC: 109 MMOL/L (ref 97–108)
CHOLEST SERPL-MCNC: 94 MG/DL
CK SERPL-CCNC: 209 U/L (ref 39–308)
CO2 SERPL-SCNC: 24 MMOL/L (ref 21–32)
CREAT SERPL-MCNC: 2.23 MG/DL (ref 0.7–1.3)
EST. AVERAGE GLUCOSE BLD GHB EST-MCNC: 151 MG/DL
GLOBULIN SER CALC-MCNC: 3.6 G/DL (ref 2–4)
GLUCOSE SERPL-MCNC: 108 MG/DL (ref 65–100)
HBA1C MFR BLD: 6.9 % (ref 4–5.6)
HDLC SERPL-MCNC: 37 MG/DL
HDLC SERPL: 2.5 {RATIO} (ref 0–5)
LDLC SERPL CALC-MCNC: 28 MG/DL (ref 0–100)
POTASSIUM SERPL-SCNC: 5.2 MMOL/L (ref 3.5–5.1)
PROT SERPL-MCNC: 7.6 G/DL (ref 6.4–8.2)
SODIUM SERPL-SCNC: 138 MMOL/L (ref 136–145)
TRIGL SERPL-MCNC: 145 MG/DL (ref ?–150)
VLDLC SERPL CALC-MCNC: 29 MG/DL

## 2021-10-01 NOTE — PROGRESS NOTES
Glyco a little up so watch diet, Kidneys a little worse so re-check BMP 2 weeks. Discussed with patient. Scheduled for f/up BMP in 2 weeks.

## 2021-10-15 ENCOUNTER — LAB ONLY (OUTPATIENT)
Dept: INTERNAL MEDICINE CLINIC | Age: 86
End: 2021-10-15

## 2021-10-15 DIAGNOSIS — R79.9 ABNORMAL BLOOD CHEMISTRY: Primary | ICD-10-CM

## 2021-10-15 LAB
ANION GAP SERPL CALC-SCNC: 5 MMOL/L (ref 5–15)
BUN SERPL-MCNC: 34 MG/DL (ref 6–20)
BUN/CREAT SERPL: 16 (ref 12–20)
CALCIUM SERPL-MCNC: 10.3 MG/DL (ref 8.5–10.1)
CHLORIDE SERPL-SCNC: 110 MMOL/L (ref 97–108)
CO2 SERPL-SCNC: 25 MMOL/L (ref 21–32)
CREAT SERPL-MCNC: 2.07 MG/DL (ref 0.7–1.3)
GLUCOSE SERPL-MCNC: 110 MG/DL (ref 65–100)
POTASSIUM SERPL-SCNC: 5.3 MMOL/L (ref 3.5–5.1)
SODIUM SERPL-SCNC: 140 MMOL/L (ref 136–145)

## 2021-11-23 DIAGNOSIS — N18.30 HYPERTENSIVE KIDNEY DISEASE WITH STAGE 3 CHRONIC KIDNEY DISEASE, UNSPECIFIED WHETHER STAGE 3A OR 3B CKD (HCC): ICD-10-CM

## 2021-11-23 DIAGNOSIS — I12.9 HYPERTENSIVE KIDNEY DISEASE WITH STAGE 3 CHRONIC KIDNEY DISEASE, UNSPECIFIED WHETHER STAGE 3A OR 3B CKD (HCC): ICD-10-CM

## 2021-11-23 RX ORDER — LOSARTAN POTASSIUM 100 MG/1
TABLET ORAL
Qty: 90 TABLET | Refills: 3 | Status: SHIPPED | OUTPATIENT
Start: 2021-11-23

## 2021-11-23 NOTE — TELEPHONE ENCOUNTER
PCP: Elvis Bazzi MD    Last appt: 9/28/2021  Future Appointments   Date Time Provider Laura Lorenzana   12/22/2021  9:10 AM Elvis Bazzi MD [facility]       Last refilled:10/21/20    Requested Prescriptions     Pending Prescriptions Disp Refills    losartan (COZAAR) 100 mg tablet [Pharmacy Med Name: Losartan Potassium 100 MG Oral Tablet] 90 Tablet 3     Sig: Take 1 tablet by mouth once daily

## 2021-12-02 DIAGNOSIS — I10 ESSENTIAL HYPERTENSION: ICD-10-CM

## 2021-12-02 DIAGNOSIS — E78.2 MIXED HYPERLIPIDEMIA: ICD-10-CM

## 2021-12-02 DIAGNOSIS — E78.5 HYPERLIPIDEMIA, UNSPECIFIED HYPERLIPIDEMIA TYPE: ICD-10-CM

## 2021-12-02 RX ORDER — AMLODIPINE BESYLATE 5 MG/1
TABLET ORAL
Qty: 90 TABLET | Refills: 3 | Status: SHIPPED | OUTPATIENT
Start: 2021-12-02

## 2021-12-02 RX ORDER — LISINOPRIL 40 MG/1
TABLET ORAL
Qty: 90 TABLET | Refills: 3 | Status: SHIPPED | OUTPATIENT
Start: 2021-12-02

## 2021-12-02 RX ORDER — GLIMEPIRIDE 4 MG/1
TABLET ORAL
Qty: 90 TABLET | Refills: 3 | Status: SHIPPED | OUTPATIENT
Start: 2021-12-02

## 2021-12-02 RX ORDER — EZETIMIBE 10 MG/1
TABLET ORAL
Qty: 90 TABLET | Refills: 3 | Status: SHIPPED | OUTPATIENT
Start: 2021-12-02

## 2021-12-02 RX ORDER — SIMVASTATIN 20 MG/1
TABLET, FILM COATED ORAL
Qty: 90 TABLET | Refills: 3 | Status: SHIPPED | OUTPATIENT
Start: 2021-12-02

## 2021-12-02 NOTE — TELEPHONE ENCOUNTER
RX refill request from the patient/pharmacy. Patient last seen 09- with labs, and next appt. scheduled for 12-  Requested Prescriptions     Pending Prescriptions Disp Refills    lisinopriL (PRINIVIL, ZESTRIL) 40 mg tablet [Pharmacy Med Name: LISINOPRIL 40MG     TAB] 90 Tablet 3     Sig: Take 1 tablet by mouth once daily    glimepiride (AMARYL) 4 mg tablet [Pharmacy Med Name: Glimepiride 4 MG Oral Tablet] 90 Tablet 3     Sig: TAKE 1 TABLET BY MOUTH IN THE MORNING    amLODIPine (NORVASC) 5 mg tablet [Pharmacy Med Name: amLODIPine Besylate 5 MG Oral Tablet] 90 Tablet 3     Sig: Take 1 tablet by mouth once daily    simvastatin (ZOCOR) 20 mg tablet [Pharmacy Med Name: Simvastatin 20 MG Oral Tablet] 90 Tablet 3     Sig: TAKE 1 TABLET BY MOUTH ONCE DAILY AT NIGHT    ezetimibe (ZETIA) 10 mg tablet [Pharmacy Med Name: Ezetimibe 10 MG Oral Tablet] 90 Tablet 3     Sig: TAKE 1 TABLET BY MOUTH ONCE DAILY AT BEDTIME   .

## 2021-12-21 PROBLEM — E55.9 VITAMIN D DEFICIENCY: Status: ACTIVE | Noted: 2021-12-21

## 2021-12-21 NOTE — PROGRESS NOTES
This is a Subsequent Medicare Annual Wellness Visit providing Personalized Prevention Plan Services (PPPS) (Performed 12 months after initial AWV and PPPS )    I have reviewed the patient's medical history in detail and updated the computerized patient record. He returns today for his Medicare subsequent annual wellness examination and screening questionnaire. He is also in follow-up of his multiple core problems include hypertension, diabetes, hyperlipidemia, CKD stage III, prostate cancer status post prostatectomy many years ago, DJD, polymyalgia rheumatica now on remission, obesity and other multiple medical problems. He currently denies any chest pain, shortness of breath, palpitations, PND, orthopnea or other cardiac or respiratory complaints. He notes no current GI or  complaints. He notes no headaches, dizziness or neurologic complaints. He has no current active arthritic complaints and and no other complaints on complete review of systems.     History     Past Medical History:   Diagnosis Date    Cancer [facility])     prostate    Cardiac arrhythmia 2017    Colon cancer screening 2017    Diabetes ([facility]     Type II    Diabetes ([facility]     DJD (degenerative joint disease) 2017    Former smoker 2017    Hypercholesterolemia     Hyperlipidemia 2017    Hypertension     Hypertension with renal disease 2017    On statin therapy 2017    Polymyalgia rheumatica ([facility] 2017      Past Surgical History:   Procedure Laterality Date    HX PROSTATECTOMY          HX PROSTATECTOMY       Social History     Tobacco Use    Smoking status: Former Smoker     Packs/day: 1.00     Years: 50.00     Pack years: 50.00     Types: Cigarettes     Start date: 1955     Quit date: 2008     Years since quittin.4    Smokeless tobacco: Never Used   Vaping Use    Vaping Use: Never used   Substance Use Topics    Alcohol use: No    Drug use: No     Current Outpatient Medications   Medication Sig Dispense Refill    lisinopriL (PRINIVIL, ZESTRIL) 40 mg tablet Take 1 tablet by mouth once daily 90 Tablet 3    glimepiride (AMARYL) 4 mg tablet TAKE 1 TABLET BY MOUTH IN THE MORNING 90 Tablet 3    amLODIPine (NORVASC) 5 mg tablet Take 1 tablet by mouth once daily 90 Tablet 3    simvastatin (ZOCOR) 20 mg tablet TAKE 1 TABLET BY MOUTH ONCE DAILY AT NIGHT 90 Tablet 3    ezetimibe (ZETIA) 10 mg tablet TAKE 1 TABLET BY MOUTH ONCE DAILY AT BEDTIME 90 Tablet 3    losartan (COZAAR) 100 mg tablet Take 1 tablet by mouth once daily 90 Tablet 3    metFORMIN ER (GLUCOPHAGE XR) 500 mg tablet Take 2 tablets by mouth twice daily 360 Tab 3    chlorthalidone (HYGROTON) 25 mg tablet Take 1 Tab by mouth daily. 30 Tab prn    JANUVIA 100 mg tablet TAKE ONE TABLET BY MOUTH ONCE DAILY 90 Tab 3    aspirin delayed-release 81 mg tablet Take  by mouth daily.  Omega-3-DHA-EPA-Fish Oil 1,000 mg (120 mg-180 mg) cap Take 1,500 mg by mouth daily.  nicotinic acid (NIACIN) 500 mg tablet Take 500 mg by mouth Daily (before breakfast). No Known Allergies  Family History   Problem Relation Age of Onset    Heart Disease Mother     Cancer Father        Patient Active Problem List    Diagnosis    Essential hypertension    CKD (chronic kidney disease) stage 3, GFR 30-59 ml/min ([facility])    Severe obesity (BMI 35.0-39. 9) with comorbidity ([facility]    Controlled type 2 diabetes mellitus with stage 3 chronic kidney disease, without long-term current use of insulin ([facility])    Mixed hyperlipidemia    Primary osteoarthritis involving multiple joints    Prostate cancer (HCC)     S/P Prostatectomy        Polymyalgia rheumatica ([facility])    Vitamin D deficiency    Alcohol screening    Acute non-recurrent maxillary sinusitis    Medicare annual wellness visit, subsequent    Former smoker    Cardiac arrhythmia       Patient Care Team:  Umer Page MD as PCP - General (Internal Medicine)  Toma Oliver MD MARC as PCP - [address] Provider    Depression Risk Factor Screening:     3 most recent PHQ Screens 12/22/2021   Little interest or pleasure in doing things Not at all   Feeling down, depressed, irritable, or hopeless Not at all   Total Score PHQ 2 0     Alcohol Risk Factor Screening: You do not drink alcohol or very rarely. Functional Ability and Level of Safety:     Fall Risk     Fall Risk Assessment, last 12 mths 12/22/2021   Able to walk? Yes   Fall in past 12 months? 0   Do you feel unsteady? 0   Are you worried about falling 0       Hearing Loss   mild    Activities of Daily Living   Self-care. ADL Assessment 12/22/2021   Feeding yourself No Help Needed   Getting from bed to chair No Help Needed   Getting dressed No Help Needed   Bathing or showering No Help Needed   Walk across the room (includes cane/walker) No Help Needed   Using the telphone No Help Needed   Taking your medications No Help Needed   Preparing meals No Help Needed   Managing money (expenses/bills) No Help Needed   Moderately strenuous housework (laundry) No Help Needed   Shopping for personal items (toiletries/medicines) No Help Needed   Shopping for groceries No Help Needed   Driving No Help Needed   Climbing a flight of stairs No Help Needed   Getting to places beyond walking distances No Help Needed       Abuse Screen   Patient is not abused    Social History     Social History Narrative    ** Merged History Encounter **            Review of Systems      ROS:    Constitutional: He denies fevers, weight loss, sweats. Eyes: No blurred or double vision. ENT: No difficulty with swallowing, taste, speech or smell. Neck: no stiffness or swelling  Respiratory: No cough wheezing or shortness of breath. Cardiovascular: Denies chest pain, palpitations, unexplained indigestion or syncope. Gastrointestinal:  No changes in bowel movements, no abdominal pain, no bloating.   Genitourinary:  He denies frequency, nocturia or stranguria. Extremities: No joint pain, stiffness or swelling. Neurological:  No numbness, tingling, burring paresthesias or loss of motor strength. No syncope, dizziness or frequent headache  Lymphatic: no adenopathy noted  Hematologic: no easy bruising or bleeding gums  Skin:  No recent rashes or mole changes. Psychiatric/Behavioral:  Negative for depression. Physical Examination     Evaluation of Cognitive Function:  Mood/affect:  happy  Appearance: age appropriate  Family member/caregiver input: None    Vitals:    12/22/21 0923 12/22/21 0949   BP: (!) 154/66 136/74   Pulse: 64    Resp: 17    Temp: 97.5 °F (36.4 °C)    TempSrc: Oral    SpO2: 96%    Weight: 222 lb 14.4 oz (101.1 kg)    Height: 5' 7\" (1.702 m)    PainSc:   0 - No pain         PHYSICAL EXAM:    General appearance - alert, well appearing, and in no distress  Mental status - alert, oriented to person, place, and time  HEENT:  Ears - bilateral TM's and external ear canals clear  Eyes - pupillary responses were normal.  Extraocular muscle function intact. Lids and conjunctiva not injected. Fundoscopic exam revealed sharp disc margins. eye movements intact  Pharynx- clear with teeth in good repair. No masses were noted  Neck - supple without thyromegaly or burit. No JVD noted  Lungs - clear to auscultation and percussion  Cardiac- normal rate, regular rhythm without murmurs. PMI not displaced. No gallop, rub or click  Abdomen - flat, soft, non-tender without palpable organomegaly or mass. No pulsatile mass was felt, and not bruit was heard.   Bowel sounds were active  : Circumcised, Testes descended w/o masses  Rectal: normal sphincter tone,  post prostatectomy, no masses, stool brown and hemacult negative  Extremities -  no clubbing cyanosis or edema  Lymphatics - no palpable lymphadenopathy, no hepatosplenomegaly  Hematologic: no petechiae or purpura  Peripheral vascular -Femoral, Dorsalis pedis and posterior tibial pulses felt without difficulty  Skin - no rash or unusual mole change noted  Neurological - Cranial nerves II-XII grossly intact. Motor strength 5/5. DTR's 2+ and symmetric. Station and gait normal  Back exam - full range of motion, no tenderness, palpable spasm or pain on motion  Musculoskeletal - no joint tenderness, deformity or swelling        Results for orders placed or performed in visit on 21/41/04   METABOLIC PANEL, BASIC   Result Value Ref Range    Sodium 140 136 - 145 mmol/L    Potassium 5.3 (H) 3.5 - 5.1 mmol/L    Chloride 110 (H) 97 - 108 mmol/L    CO2 25 21 - 32 mmol/L    Anion gap 5 5 - 15 mmol/L    Glucose 110 (H) 65 - 100 mg/dL    BUN 34 (H) 6 - 20 MG/DL    Creatinine 2.07 (H) 0.70 - 1.30 MG/DL    BUN/Creatinine ratio 16 12 - 20      GFR est AA 37 (L) >60 ml/min/1.73m2    GFR est non-AA 31 (L) >60 ml/min/1.73m2    Calcium 10.3 (H) 8.5 - 10.1 MG/DL       Advice/Referrals/Counseling   Education and counseling provided:  Are appropriate based on today's review and evaluation  End-of-Life planning (with patient's consent)  Pneumococcal Vaccine  Influenza Vaccine  Colorectal cancer screening tests      Assessment/Plan     ASSESSMENT:   1. Essential hypertension    2. Controlled type 2 diabetes mellitus with stage 3 chronic kidney disease, without long-term current use of insulin ([address]    3. Mixed hyperlipidemia    4. Stage 3 chronic kidney disease, unspecified whether stage 3a or 3b CKD ([address]    5. Primary osteoarthritis involving multiple joints    6. Severe obesity (BMI 35.0-39. 9) with comorbidity ([address]    7. Polymyalgia rheumatica ([address]    8. Prostate cancer ([address]    9. Former smoker    8. Alcohol screening    11. Vitamin D deficiency    12. Medicare annual wellness visit, subsequent      Impression  1. Hypertension that is controlled so continue current therapy reviewed with him. 2.  Diabetes mellitus repeat status is pending a prior lab review not make adjustments if necessary.   3.  Hyperlipidemia prior lab reviewed repeat status pending I will adjust if needed. 4.  CKD stage III repeat status pending  5. DJD chronic but stable  6. Obesity we did discuss diet, exercise and weight reduction for overall health benefit. 7.  Previous polymyalgia rheumatica now in remission  8. Prostate cancer post prostatectomy many years ago PSA is pending  9. Former smoker no longer smokes  10. Annual alcohol screening is done. We did spend 5 minutes discussing complications of excessive alcohol use in males with more than 2 drinks average per day with increased cardiovascular risk and increased risk of liver disease and other GI problems. He drinks maybe 1 drink per year at [facility] [place]. 11.  Vitamin D deficiency repeat status is pending  Medicare subsequent annual wellness examination screening questionnaires completed today. The results were reviewed with him and his questions were answered. Lifestyle recommendations modifications discussed and made. I will call the lab results and make further recommendations or adjustments if necessary. Follow-up scheduled for 3 months or sooner should there be a problem. PLAN:  .  Orders Placed This Encounter    CBC WITH AUTOMATED DIFF    METABOLIC PANEL, COMPREHENSIVE    LIPID PANEL    T4 (THYROXINE)    TSH 3RD GENERATION    URINALYSIS W/ REFLEX CULTURE    PSA SCREENING (SCREENING)    VITAMIN D, 25 HYDROXY    CK    AMB POC URINE, MICROALBUMIN, SEMIQUANT (1 RESULT)         ATTENTION:   This medical record was transcribed using an electronic medical records system. Although proofread, it may and can contain electronic and spelling errors. Other human spelling and other errors may be present. Corrections may be executed at a later time. Please feel free to contact us for any clarifications as needed. Follow-up and Dispositions    · Return in about 3 months (around 3/22/2022).            Anila Colindres MD    Recommended healthy diet low in carbohydrates, fats, sodium and cholesterol. Recommended regular cardiovascular exercise 3-6 times per week for 30-60 minutes daily. Current Outpatient Medications   Medication Sig Dispense Refill    lisinopriL (PRINIVIL, ZESTRIL) 40 mg tablet Take 1 tablet by mouth once daily 90 Tablet 3    glimepiride (AMARYL) 4 mg tablet TAKE 1 TABLET BY MOUTH IN THE MORNING 90 Tablet 3    amLODIPine (NORVASC) 5 mg tablet Take 1 tablet by mouth once daily 90 Tablet 3    simvastatin (ZOCOR) 20 mg tablet TAKE 1 TABLET BY MOUTH ONCE DAILY AT NIGHT 90 Tablet 3    ezetimibe (ZETIA) 10 mg tablet TAKE 1 TABLET BY MOUTH ONCE DAILY AT BEDTIME 90 Tablet 3    losartan (COZAAR) 100 mg tablet Take 1 tablet by mouth once daily 90 Tablet 3    metFORMIN ER (GLUCOPHAGE XR) 500 mg tablet Take 2 tablets by mouth twice daily 360 Tab 3    chlorthalidone (HYGROTON) 25 mg tablet Take 1 Tab by mouth daily. 30 Tab prn    JANUVIA 100 mg tablet TAKE ONE TABLET BY MOUTH ONCE DAILY 90 Tab 3    aspirin delayed-release 81 mg tablet Take  by mouth daily.  Omega-3-DHA-EPA-Fish Oil 1,000 mg (120 mg-180 mg) cap Take 1,500 mg by mouth daily.  nicotinic acid (NIACIN) 500 mg tablet Take 500 mg by mouth Daily (before breakfast). No results found for any visits on 12/22/21. Verbal and written instructions (see AVS) provided. Patient expresses understanding of diagnosis and treatment plan.     Baldo Helm MD

## 2021-12-22 ENCOUNTER — OFFICE VISIT (OUTPATIENT)
Dept: INTERNAL MEDICINE CLINIC | Age: 86
End: 2021-12-22
Payer: MEDICARE

## 2021-12-22 VITALS
DIASTOLIC BLOOD PRESSURE: 74 MMHG | HEART RATE: 64 BPM | HEIGHT: 67 IN | OXYGEN SATURATION: 96 % | SYSTOLIC BLOOD PRESSURE: 136 MMHG | TEMPERATURE: 97.5 F | BODY MASS INDEX: 34.99 KG/M2 | WEIGHT: 222.9 LBS | RESPIRATION RATE: 17 BRPM

## 2021-12-22 DIAGNOSIS — I10 ESSENTIAL HYPERTENSION: Primary | ICD-10-CM

## 2021-12-22 DIAGNOSIS — C61 PROSTATE CANCER (HCC): ICD-10-CM

## 2021-12-22 DIAGNOSIS — E55.9 VITAMIN D DEFICIENCY: ICD-10-CM

## 2021-12-22 DIAGNOSIS — E11.22 CONTROLLED TYPE 2 DIABETES MELLITUS WITH STAGE 3 CHRONIC KIDNEY DISEASE, WITHOUT LONG-TERM CURRENT USE OF INSULIN (HCC): ICD-10-CM

## 2021-12-22 DIAGNOSIS — M15.9 PRIMARY OSTEOARTHRITIS INVOLVING MULTIPLE JOINTS: ICD-10-CM

## 2021-12-22 DIAGNOSIS — N18.30 STAGE 3 CHRONIC KIDNEY DISEASE, UNSPECIFIED WHETHER STAGE 3A OR 3B CKD (HCC): ICD-10-CM

## 2021-12-22 DIAGNOSIS — E66.01 SEVERE OBESITY (BMI 35.0-39.9) WITH COMORBIDITY (HCC): ICD-10-CM

## 2021-12-22 DIAGNOSIS — Z00.00 MEDICARE ANNUAL WELLNESS VISIT, SUBSEQUENT: ICD-10-CM

## 2021-12-22 DIAGNOSIS — Z13.39 ALCOHOL SCREENING: ICD-10-CM

## 2021-12-22 DIAGNOSIS — E78.2 MIXED HYPERLIPIDEMIA: ICD-10-CM

## 2021-12-22 DIAGNOSIS — M35.3 POLYMYALGIA RHEUMATICA (HCC): ICD-10-CM

## 2021-12-22 DIAGNOSIS — Z87.891 FORMER SMOKER: ICD-10-CM

## 2021-12-22 DIAGNOSIS — N18.30 CONTROLLED TYPE 2 DIABETES MELLITUS WITH STAGE 3 CHRONIC KIDNEY DISEASE, WITHOUT LONG-TERM CURRENT USE OF INSULIN (HCC): ICD-10-CM

## 2021-12-22 LAB
25(OH)D3 SERPL-MCNC: 15.1 NG/ML (ref 30–100)
ALBUMIN SERPL-MCNC: 4.2 G/DL (ref 3.5–5)
ALBUMIN/GLOB SERPL: 1.2 {RATIO} (ref 1.1–2.2)
ALP SERPL-CCNC: 61 U/L (ref 45–117)
ALT SERPL-CCNC: 32 U/L (ref 12–78)
ANION GAP SERPL CALC-SCNC: 9 MMOL/L (ref 5–15)
APPEARANCE UR: CLEAR
AST SERPL-CCNC: 29 U/L (ref 15–37)
BACTERIA URNS QL MICRO: NEGATIVE /HPF
BASOPHILS # BLD: 0.1 K/UL (ref 0–0.1)
BASOPHILS NFR BLD: 1 % (ref 0–1)
BILIRUB SERPL-MCNC: 1.3 MG/DL (ref 0.2–1)
BILIRUB UR QL: NEGATIVE
BUN SERPL-MCNC: 31 MG/DL (ref 6–20)
BUN/CREAT SERPL: 15 (ref 12–20)
CALCIUM SERPL-MCNC: 10.5 MG/DL (ref 8.5–10.1)
CHLORIDE SERPL-SCNC: 107 MMOL/L (ref 97–108)
CHOLEST SERPL-MCNC: 88 MG/DL
CK SERPL-CCNC: 165 U/L (ref 39–308)
CO2 SERPL-SCNC: 23 MMOL/L (ref 21–32)
COLOR UR: ABNORMAL
CREAT SERPL-MCNC: 2.03 MG/DL (ref 0.7–1.3)
DIFFERENTIAL METHOD BLD: NORMAL
EOSINOPHIL # BLD: 0.1 K/UL (ref 0–0.4)
EOSINOPHIL NFR BLD: 1 % (ref 0–7)
EPITH CASTS URNS QL MICRO: ABNORMAL /LPF
ERYTHROCYTE [DISTWIDTH] IN BLOOD BY AUTOMATED COUNT: 13.6 % (ref 11.5–14.5)
GLOBULIN SER CALC-MCNC: 3.5 G/DL (ref 2–4)
GLUCOSE SERPL-MCNC: 82 MG/DL (ref 65–100)
GLUCOSE UR STRIP.AUTO-MCNC: NEGATIVE MG/DL
HCT VFR BLD AUTO: 40.6 % (ref 36.6–50.3)
HDLC SERPL-MCNC: 47 MG/DL
HDLC SERPL: 1.9 {RATIO} (ref 0–5)
HGB BLD-MCNC: 13.1 G/DL (ref 12.1–17)
HGB UR QL STRIP: NEGATIVE
HYALINE CASTS URNS QL MICRO: ABNORMAL /LPF (ref 0–5)
IMM GRANULOCYTES # BLD AUTO: 0 K/UL (ref 0–0.04)
IMM GRANULOCYTES NFR BLD AUTO: 0 % (ref 0–0.5)
KETONES UR QL STRIP.AUTO: NEGATIVE MG/DL
LDLC SERPL CALC-MCNC: 23.4 MG/DL (ref 0–100)
LEUKOCYTE ESTERASE UR QL STRIP.AUTO: ABNORMAL
LYMPHOCYTES # BLD: 2.6 K/UL (ref 0.8–3.5)
LYMPHOCYTES NFR BLD: 31 % (ref 12–49)
MCH RBC QN AUTO: 32 PG (ref 26–34)
MCHC RBC AUTO-ENTMCNC: 32.3 G/DL (ref 30–36.5)
MCV RBC AUTO: 99 FL (ref 80–99)
MICROALBUMIN UR TEST STR-MCNC: 150 MG/L (ref 0–20)
MONOCYTES # BLD: 0.9 K/UL (ref 0–1)
MONOCYTES NFR BLD: 11 % (ref 5–13)
NEUTS SEG # BLD: 4.8 K/UL (ref 1.8–8)
NEUTS SEG NFR BLD: 56 % (ref 32–75)
NITRITE UR QL STRIP.AUTO: NEGATIVE
NRBC # BLD: 0 K/UL (ref 0–0.01)
NRBC BLD-RTO: 0 PER 100 WBC
PH UR STRIP: 6.5 [PH] (ref 5–8)
PLATELET # BLD AUTO: 227 K/UL (ref 150–400)
PMV BLD AUTO: 11.9 FL (ref 8.9–12.9)
POTASSIUM SERPL-SCNC: 4.7 MMOL/L (ref 3.5–5.1)
PROT SERPL-MCNC: 7.7 G/DL (ref 6.4–8.2)
PROT UR STRIP-MCNC: 30 MG/DL
PSA SERPL-MCNC: 0.2 NG/ML (ref 0.01–4)
RBC # BLD AUTO: 4.1 M/UL (ref 4.1–5.7)
RBC #/AREA URNS HPF: ABNORMAL /HPF (ref 0–5)
SODIUM SERPL-SCNC: 139 MMOL/L (ref 136–145)
SP GR UR REFRACTOMETRY: 1.02 (ref 1–1.03)
T4 SERPL-MCNC: 9 UG/DL (ref 4.5–12.1)
TRIGL SERPL-MCNC: 88 MG/DL (ref ?–150)
TSH SERPL DL<=0.05 MIU/L-ACNC: 1.57 UIU/ML (ref 0.36–3.74)
UA: UC IF INDICATED,UAUC: ABNORMAL
UROBILINOGEN UR QL STRIP.AUTO: 0.2 EU/DL (ref 0.2–1)
VLDLC SERPL CALC-MCNC: 17.6 MG/DL
WBC # BLD AUTO: 8.5 K/UL (ref 4.1–11.1)
WBC URNS QL MICRO: ABNORMAL /HPF (ref 0–4)

## 2021-12-22 PROCEDURE — 82044 UR ALBUMIN SEMIQUANTITATIVE: CPT | Performed by: INTERNAL MEDICINE

## 2021-12-22 PROCEDURE — G8427 DOCREV CUR MEDS BY ELIG CLIN: HCPCS | Performed by: INTERNAL MEDICINE

## 2021-12-22 PROCEDURE — G0439 PPPS, SUBSEQ VISIT: HCPCS | Performed by: INTERNAL MEDICINE

## 2021-12-22 PROCEDURE — G8510 SCR DEP NEG, NO PLAN REQD: HCPCS | Performed by: INTERNAL MEDICINE

## 2021-12-22 PROCEDURE — 1101F PT FALLS ASSESS-DOCD LE1/YR: CPT | Performed by: INTERNAL MEDICINE

## 2021-12-22 PROCEDURE — 99214 OFFICE O/P EST MOD 30 MIN: CPT | Performed by: INTERNAL MEDICINE

## 2021-12-22 PROCEDURE — G8417 CALC BMI ABV UP PARAM F/U: HCPCS | Performed by: INTERNAL MEDICINE

## 2021-12-22 PROCEDURE — G8536 NO DOC ELDER MAL SCRN: HCPCS | Performed by: INTERNAL MEDICINE

## 2021-12-22 NOTE — PATIENT INSTRUCTIONS
Medicare Wellness Visit, Male    The best way to live healthy is to have a lifestyle where you eat a well-balanced diet, exercise regularly, limit alcohol use, and quit all forms of tobacco/nicotine, if applicable. Regular preventive services are another way to keep healthy. Preventive services (vaccines, screening tests, monitoring & exams) can help personalize your care plan, which helps you manage your own care. Screening tests can find health problems at the earliest stages, when they are easiest to treat. [facility] follows the current, evidence-based guidelines published by the [facility] ([facility]) when recommending preventive services for our patients. Because we follow these guidelines, sometimes recommendations change over time as research supports it. (For example, a prostate screening blood test is no longer routinely recommended for men with no symptoms). Of course, you and your doctor may decide to screen more often for some diseases, based on your risk and co-morbidities (chronic disease you are already diagnosed with). Preventive services for you include:  - Medicare offers their members a free annual wellness visit, which is time for you and your primary care provider to discuss and plan for your preventive service needs. Take advantage of this benefit every year!  -All adults over age 72 should receive the recommended pneumonia vaccines. Current USPSTF guidelines recommend a series of two vaccines for the best pneumonia protection.   -All adults should have a flu vaccine yearly and tetanus vaccine every 10 years.  -All adults age 48 and older should receive the shingles vaccines (series of two vaccines).        -All adults age 38-68 who are overweight should have a diabetes screening test once every three years.   -Other screening tests & preventive services for persons with diabetes include: an eye exam to screen for diabetic retinopathy, a kidney function test, a foot exam, and stricter control over your cholesterol.   -Cardiovascular screening for adults with routine risk involves an electrocardiogram (ECG) at intervals determined by the provider.   -Colorectal cancer screening should be done for adults age 54-65 with no increased risk factors for colorectal cancer. There are a number of acceptable methods of screening for this type of cancer. Each test has its own benefits and drawbacks. Discuss with your provider what is most appropriate for you during your annual wellness visit. The different tests include: colonoscopy (considered the best screening method), a fecal occult blood test, a fecal DNA test, and sigmoidoscopy.  -All adults born between [facility] should be screened once for Hepatitis C.  -An Abdominal Aortic Aneurysm (AAA) Screening is recommended for men age 73-68 who has ever smoked in their lifetime.      Here is a list of your current Health Maintenance items (your personalized list of preventive services) with a due date:  Health Maintenance Due   Topic Date Due    DTaP/Tdap/Td  (1 - Tdap) Never done    Shingles Vaccine (1 of 2) Never done    Eye Exam  02/09/2020    COVID-19 Vaccine (3 - Booster for Moderna series) 10/26/2021    Albumin Urine Test  12/23/2021    Annual Well Visit  12/24/2021

## 2021-12-22 NOTE — PROGRESS NOTES
Chief Complaint   Patient presents with   [facility] Annual Wellness Visit     Visit Vitals  BP (!) 154/66 (BP 1 Location: Left upper arm, BP Patient Position: Sitting, BP Cuff Size: Adult)   Pulse 64   Temp 97.5 °F (36.4 °C) (Oral)   Resp 17   Ht 5' 7\" (1.702 m)   Wt 222 lb 14.4 oz (101.1 kg)   SpO2 96%   BMI 34.91 kg/m²     1. Have you been to the ER, urgent care clinic since your last visit? Hospitalized since your last visit? No    2. Have you seen or consulted any other health care providers outside of the [address] [place] [address] since your last visit? Include any pap smears or colon screening. No      Depression Risk Factor Screening:     3 most recent PHQ Screens 12/22/2021   Little interest or pleasure in doing things Not at all   Feeling down, depressed, irritable, or hopeless Not at all   Total Score PHQ 2 0       Functional Ability and Level of Safety:     Activities of Daily Living  ADL Assessment 12/22/2021   Feeding yourself No Help Needed   Getting from bed to chair No Help Needed   Getting dressed No Help Needed   Bathing or showering No Help Needed   Walk across the room (includes cane/walker) No Help Needed   Using the telphone No Help Needed   Taking your medications No Help Needed   Preparing meals No Help Needed   Managing money (expenses/bills) No Help Needed   Moderately strenuous housework (laundry) No Help Needed   Shopping for personal items (toiletries/medicines) No Help Needed   Shopping for groceries No Help Needed   Driving No Help Needed   Climbing a flight of stairs No Help Needed   Getting to places beyond walking distances No Help Needed       Fall Risk  Fall Risk Assessment, last 12 mths 12/22/2021   Able to walk? Yes   Fall in past 12 months? 0   Do you feel unsteady? 0   Are you worried about falling 0       Abuse Screen  Abuse Screening Questionnaire 12/22/2021   Do you ever feel afraid of your partner?  N   Are you in a relationship with someone who physically or mentally threatens you? N   Is it safe for you to go home?  Y         Patient Care Team   Patient Care Team:  Conner Almonte MD as PCP - General (Internal Medicine)  Conner Almonte MD as PCP - [facility]

## 2022-01-20 PROBLEM — Z00.00 MEDICARE ANNUAL WELLNESS VISIT, SUBSEQUENT: Status: RESOLVED | Noted: 2017-10-27 | Resolved: 2022-01-20

## 2022-02-16 RX ORDER — CHLORTHALIDONE 25 MG/1
TABLET ORAL
Qty: 90 TABLET | Refills: 1 | Status: SHIPPED | OUTPATIENT
Start: 2022-02-16 | End: 2022-08-17

## 2022-02-16 NOTE — TELEPHONE ENCOUNTER
RX refill request from the patient/pharmacy. Patient last seen 12-22-01-03 with labs, and next appt. scheduled for 03-  Requested Prescriptions     Pending Prescriptions Disp Refills    chlorthalidone (HYGROTON) 25 mg tablet [Pharmacy Med Name: Chlorthalidone 25 MG Oral Tablet] 90 Tablet 1     Sig: Take 1 tablet by mouth once daily   .

## 2022-03-18 PROBLEM — E55.9 VITAMIN D DEFICIENCY: Status: ACTIVE | Noted: 2021-12-21

## 2022-03-18 PROBLEM — I49.9 CARDIAC ARRHYTHMIA: Status: ACTIVE | Noted: 2017-07-20

## 2022-03-18 PROBLEM — J01.00 ACUTE NON-RECURRENT MAXILLARY SINUSITIS: Status: ACTIVE | Noted: 2019-06-12

## 2022-03-19 PROBLEM — N18.30 CKD (CHRONIC KIDNEY DISEASE) STAGE 3, GFR 30-59 ML/MIN (HCC): Status: ACTIVE | Noted: 2020-12-23

## 2022-03-19 PROBLEM — E11.22 CONTROLLED TYPE 2 DIABETES MELLITUS WITH STAGE 3 CHRONIC KIDNEY DISEASE, WITHOUT LONG-TERM CURRENT USE OF INSULIN (HCC): Status: ACTIVE | Noted: 2017-07-20

## 2022-03-19 PROBLEM — N18.30 CONTROLLED TYPE 2 DIABETES MELLITUS WITH STAGE 3 CHRONIC KIDNEY DISEASE, WITHOUT LONG-TERM CURRENT USE OF INSULIN (HCC): Status: ACTIVE | Noted: 2017-07-20

## 2022-03-19 PROBLEM — C61 PROSTATE CANCER (HCC): Status: ACTIVE | Noted: 2017-07-20

## 2022-03-19 PROBLEM — Z13.39 ALCOHOL SCREENING: Status: ACTIVE | Noted: 2019-12-16

## 2022-03-19 PROBLEM — M15.9 PRIMARY OSTEOARTHRITIS INVOLVING MULTIPLE JOINTS: Status: ACTIVE | Noted: 2017-07-20

## 2022-03-19 PROBLEM — E66.01 SEVERE OBESITY (BMI 35.0-39.9) WITH COMORBIDITY (HCC): Status: ACTIVE | Noted: 2018-05-16

## 2022-03-19 PROBLEM — M15.0 PRIMARY OSTEOARTHRITIS INVOLVING MULTIPLE JOINTS: Status: ACTIVE | Noted: 2017-07-20

## 2022-03-19 PROBLEM — Z87.891 FORMER SMOKER: Status: ACTIVE | Noted: 2017-07-20

## 2022-03-20 PROBLEM — I10 ESSENTIAL HYPERTENSION: Status: ACTIVE | Noted: 2021-03-31

## 2022-03-20 PROBLEM — E78.2 MIXED HYPERLIPIDEMIA: Status: ACTIVE | Noted: 2017-07-20

## 2022-03-29 NOTE — PROGRESS NOTES
Chief Complaint   Patient presents with    Hypertension     3 month follow up    Diabetes    Cholesterol Problem       SUBJECTIVE:    Manjit Elizabeth is a 80 y.o. male who returns in follow-up for his medical problems \"hypertension, diabetes, hyperlipidemia, DJD, obesity, history of polymyalgia rheumatica, and other medical problems. He is taking his medications and trying to follow his diet and remains physically active. He currently denies any chest pain, shortness of breath, palpitations, PND, orthopnea or other cardiac or respiratory complaints. He notes no current GI  complaints. He has no headaches, dizziness or neurologic complaints. He has no change of his chronic arthritic complaints and there are no other complaints on the review of systems. Current Outpatient Medications   Medication Sig Dispense Refill    chlorthalidone (HYGROTON) 25 mg tablet Take 1 tablet by mouth once daily 90 Tablet 1    lisinopriL (PRINIVIL, ZESTRIL) 40 mg tablet Take 1 tablet by mouth once daily 90 Tablet 3    glimepiride (AMARYL) 4 mg tablet TAKE 1 TABLET BY MOUTH IN THE MORNING 90 Tablet 3    amLODIPine (NORVASC) 5 mg tablet Take 1 tablet by mouth once daily 90 Tablet 3    simvastatin (ZOCOR) 20 mg tablet TAKE 1 TABLET BY MOUTH ONCE DAILY AT NIGHT 90 Tablet 3    ezetimibe (ZETIA) 10 mg tablet TAKE 1 TABLET BY MOUTH ONCE DAILY AT BEDTIME 90 Tablet 3    losartan (COZAAR) 100 mg tablet Take 1 tablet by mouth once daily 90 Tablet 3    metFORMIN ER (GLUCOPHAGE XR) 500 mg tablet Take 2 tablets by mouth twice daily 360 Tab 3    JANUVIA 100 mg tablet TAKE ONE TABLET BY MOUTH ONCE DAILY 90 Tab 3    aspirin delayed-release 81 mg tablet Take  by mouth daily.  Omega-3-DHA-EPA-Fish Oil 1,000 mg (120 mg-180 mg) cap Take 1,500 mg by mouth daily.  nicotinic acid (NIACIN) 500 mg tablet Take 500 mg by mouth Daily (before breakfast).        Past Medical History:   Diagnosis Date    Cancer [facility])     prostate  Cardiac arrhythmia 2017    Colon cancer screening 2017    Diabetes ([facility]     Type II    Diabetes ([facility]     DJD (degenerative joint disease) 2017    Former smoker 2017    Hypercholesterolemia     Hyperlipidemia 2017    Hypertension     Hypertension with renal disease 2017    On statin therapy 2017    [facility]algia rhe[facility]a [facility] 2017     Past Surgical History:   Procedure Laterality Date    HX PROSTATECTOMY      1969    HX PROSTATECTOMY       No Known Allergies    REVIEW OF SYSTEMS:  General: negative for - chills or fever, or weight loss or gain  ENT: negative for - headaches, nasal congestion or tinnitus  Eyes: no blurred or visual changes  Neck: No stiffness or swollen nodes  Respiratory: negative for - cough, hemoptysis, shortness of breath or wheezing  Cardiovascular : negative for - chest pain, edema, palpitations or shortness of breath  Gastrointestinal: negative for - abdominal pain, blood in stools, heartburn or nausea/vomiting  Genito-Urinary: no dysuria, trouble voiding, or hematuria  Musculoskeletal: negative for - gait disturbance, joint pain, joint stiffness or joint swelling  Neurological: no TIA or stroke symptoms  Hematologic: no bruises, no bleeding  Lymphatic: no swollen glands  Integument: no lumps, mole changes, nail changes or rash  Endocrine:no malaise/lethargy poly uria or polydipsia or unexpected weight changes        Social History     Socioeconomic History    Marital status:    Tobacco Use    Smoking status: Former Smoker     Packs/day: 1.00     Years: 50.00     Pack years: 50.00     Types: Cigarettes     Start date: 1955     Quit date: 2008     Years since quittin.7    Smokeless tobacco: Never Used   Vaping Use    Vaping Use: Never used   Substance and Sexual Activity    Alcohol use: No    Drug use: No    Sexual activity: Never   Social History Narrative    ** Merged History Encounter **          Family History   Problem Relation Age of Onset    Heart Disease Mother     Cancer Father        OBJECTIVE:     Visit Vitals  /74   Pulse 70   Temp 97.7 °F (36.5 °C) (Oral)   Resp 18   Ht 5' 7\" (1.702 m)   Wt 226 lb 11.2 oz (102.8 kg)   SpO2 96%   BMI 35.51 kg/m²     CONSTITUTIONAL:   well nourished, appears age appropriate  EYES: sclera anicteric, PERRL, EOMI  ENMT:nares clear, moist mucous membranes, pharynx clear  NECK: supple. Thyroid normal, No JVD or bruits  RESPIRATORY: Chest: clear to ascultation and percussion, normal inspiratory effort  CARDIOVASCULAR: Heart: regular rate and rhythm no murmurs, rubs or gallops, PMI not displaced, No thrills, no peripheral edema  GASTROINTESTINAL: Abdomen: non distended, soft, non tender, bowel sounds normal  HEMATOLOGIC: no purpura, petechiae or bruising  LYMPHATIC: No lymph node enlargemant  MUSCULOSKELETAL: Extremities: no active synovitis, pulse 1+. No diabetic foot changes noted. INTEGUMENT: No unusual rashes or suspicious skin lesions noted. Nails appear normal.  PERIPHERAL VASCULAR: normal pulses femoral, PT and DP  NEUROLOGIC: non-focal exam, A & O X 3. Normal distal sensation and proprioception all toes both feet. PSYCHIATRIC:, appropriate affect     ASSESSMENT:   1. Essential hypertension    2. Controlled type 2 diabetes mellitus with stage 3 chronic kidney disease, without long-term current use of insulin ([address]    3. Mixed hyperlipidemia    4. Stage 3 chronic kidney disease, unspecified whether stage 3a or 3b CKD ([address]    5. Primary osteoarthritis involving multiple joints    6. Severe obesity (BMI 35.0-39. 9) with comorbidity ([address]    7. Polymyalgia rheumatica ([address]    8. Prostate cancer ([address]      Impression  1. Hypertension that is controlled so continue current therapy reviewed with him. 2.  Diabetes repeat status pending and prior lab reviewed and I will adjust if needed.   3.  Hyperlipidemia prior lab reviewed repeat status pending I will adjust if needed. 4.  CKD stage III repeat status pending  5. DJD that is stable  6. Obesity we did discuss diet, exercise and weight reduction for overall health benefit. 7.  Polymyalgia rheumatica in remission  8. Prostate cancer no evidence of recurrence post prostatectomy many years ago  I will call the lab and follow stable continue same follow-up schedule III months or sooner if there is a problem. PLAN:  .  Orders Placed This Encounter    METABOLIC PANEL, COMPREHENSIVE    LIPID PANEL    CK    HEMOGLOBIN A1C WITH EAG         ATTENTION:   This medical record was transcribed using an electronic medical records system. Although proofread, it may and can contain electronic and spelling errors. Other human spelling and other errors may be present. Corrections may be executed at a later time. Please feel free to contact us for any clarifications as needed. Follow-up and Dispositions    · Return in about 3 months (around 6/30/2022). No results found for any visits on 03/30/22. Ambar Aguilar MD    The patient verbalized understanding of the problems and plans as explained.

## 2022-03-30 ENCOUNTER — OFFICE VISIT (OUTPATIENT)
Dept: INTERNAL MEDICINE CLINIC | Age: 87
End: 2022-03-30
Payer: MEDICARE

## 2022-03-30 VITALS
BODY MASS INDEX: 35.58 KG/M2 | WEIGHT: 226.7 LBS | HEART RATE: 70 BPM | DIASTOLIC BLOOD PRESSURE: 74 MMHG | OXYGEN SATURATION: 96 % | SYSTOLIC BLOOD PRESSURE: 136 MMHG | HEIGHT: 67 IN | TEMPERATURE: 97.7 F | RESPIRATION RATE: 18 BRPM

## 2022-03-30 DIAGNOSIS — M15.9 PRIMARY OSTEOARTHRITIS INVOLVING MULTIPLE JOINTS: ICD-10-CM

## 2022-03-30 DIAGNOSIS — C61 PROSTATE CANCER (HCC): ICD-10-CM

## 2022-03-30 DIAGNOSIS — N18.30 CONTROLLED TYPE 2 DIABETES MELLITUS WITH STAGE 3 CHRONIC KIDNEY DISEASE, WITHOUT LONG-TERM CURRENT USE OF INSULIN (HCC): ICD-10-CM

## 2022-03-30 DIAGNOSIS — E78.2 MIXED HYPERLIPIDEMIA: ICD-10-CM

## 2022-03-30 DIAGNOSIS — I10 ESSENTIAL HYPERTENSION: Primary | ICD-10-CM

## 2022-03-30 DIAGNOSIS — N18.30 STAGE 3 CHRONIC KIDNEY DISEASE, UNSPECIFIED WHETHER STAGE 3A OR 3B CKD (HCC): ICD-10-CM

## 2022-03-30 DIAGNOSIS — E11.22 CONTROLLED TYPE 2 DIABETES MELLITUS WITH STAGE 3 CHRONIC KIDNEY DISEASE, WITHOUT LONG-TERM CURRENT USE OF INSULIN (HCC): ICD-10-CM

## 2022-03-30 DIAGNOSIS — E66.01 SEVERE OBESITY (BMI 35.0-39.9) WITH COMORBIDITY (HCC): ICD-10-CM

## 2022-03-30 DIAGNOSIS — M35.3 POLYMYALGIA RHEUMATICA (HCC): ICD-10-CM

## 2022-03-30 PROCEDURE — G8427 DOCREV CUR MEDS BY ELIG CLIN: HCPCS | Performed by: INTERNAL MEDICINE

## 2022-03-30 PROCEDURE — G8536 NO DOC ELDER MAL SCRN: HCPCS | Performed by: INTERNAL MEDICINE

## 2022-03-30 PROCEDURE — G8432 DEP SCR NOT DOC, RNG: HCPCS | Performed by: INTERNAL MEDICINE

## 2022-03-30 PROCEDURE — 1101F PT FALLS ASSESS-DOCD LE1/YR: CPT | Performed by: INTERNAL MEDICINE

## 2022-03-30 PROCEDURE — 99214 OFFICE O/P EST MOD 30 MIN: CPT | Performed by: INTERNAL MEDICINE

## 2022-03-30 PROCEDURE — G8417 CALC BMI ABV UP PARAM F/U: HCPCS | Performed by: INTERNAL MEDICINE

## 2022-03-30 NOTE — PATIENT INSTRUCTIONS
Body Mass Index: Care Instructions  Your Care Instructions     Body mass index (BMI) can help you see if your weight is raising your risk for health problems. It uses a formula to compare how much you weigh with how tall you are. · A BMI lower than 18.5 is considered underweight. · A BMI between 18.5 and 24.9 is considered healthy. · A BMI between 25 and 29.9 is considered overweight. A BMI of 30 or higher is considered obese. If your BMI is in the normal range, it means that you have a lower risk for weight-related health problems. If your BMI is in the overweight or obese range, you may be at increased risk for weight-related health problems, such as high blood pressure, heart disease, stroke, arthritis or joint pain, and diabetes. If your BMI is in the underweight range, you may be at increased risk for health problems such as fatigue, lower protection (immunity) against illness, muscle loss, bone loss, hair loss, and hormone problems. BMI is just one measure of your risk for weight-related health problems. You may be at higher risk for health problems if you are not active, you eat an unhealthy diet, or you drink too much alcohol or use tobacco products. Follow-up care is a key part of your treatment and safety. Be sure to make and go to all appointments, and call your doctor if you are having problems. It's also a good idea to know your test results and keep a list of the medicines you take. How can you care for yourself at home? · Practice healthy eating habits. This includes eating plenty of fruits, vegetables, whole grains, lean protein, and low-fat dairy. · If your doctor recommends it, get more exercise. Walking is a good choice. Bit by bit, increase the amount you walk every day. Try for at least 30 minutes on most days of the week. · Do not smoke. Smoking can increase your risk for health problems. If you need help quitting, talk to your doctor about stop-smoking programs and medicines. These can increase your chances of quitting for good. · Limit alcohol to 2 drinks a day for men and 1 drink a day for women. Too much alcohol can cause health problems. If you have a BMI higher than 25  · Your doctor may do other tests to check your risk for weight-related health problems. This may include measuring the distance around your waist. A waist measurement of more than 40 inches in men or 35 inches in women can increase the risk of weight-related health problems. · Talk with your doctor about steps you can take to stay healthy or improve your health. You may need to make lifestyle changes to lose weight and stay healthy, such as changing your diet and getting regular exercise. If you have a BMI lower than 18.5  · Your doctor may do other tests to check your risk for health problems. · Talk with your doctor about steps you can take to stay healthy or improve your health. You may need to make lifestyle changes to gain or maintain weight and stay healthy, such as getting more healthy foods in your diet and doing exercises to build muscle. Where can you learn more? Go to http://www.lopez.com/  Enter S176 in the search box to learn more about \"Body Mass Index: Care Instructions. \"  Current as of: December 27, 2021               Content Version: 13.2  © 2006-2022 [employer]. Care instructions adapted under license by [employer] (which disclaims liability or warranty for this information). If you have questions about a medical condition or this instruction, always ask your healthcare professional. [place] [address] any warranty or liability for your use of this information.

## 2022-03-30 NOTE — PROGRESS NOTES
Chief Complaint   Patient presents with    Hypertension     3 month follow up    Diabetes    Cholesterol Problem     Visit Vitals  BP (!) 150/76 (BP 1 Location: Left upper arm, BP Patient Position: Sitting, BP Cuff Size: Adult)   Pulse 70   Temp 97.7 °F (36.5 °C) (Oral)   Resp 18   Ht 5' 7\" (1.702 m)   Wt 226 lb 11.2 oz (102.8 kg)   SpO2 96%   BMI 35.51 kg/m²     1. Have you been to the ER, urgent care clinic since your last visit? Hospitalized since your last visit? No    2. Have you seen or consulted any other health care providers outside of the [address] [place] [address] since your last visit? Include any pap smears or colon screening.  No

## 2022-03-31 LAB
ALBUMIN SERPL-MCNC: 4.5 G/DL (ref 3.5–5)
ALBUMIN/GLOB SERPL: 1.4 {RATIO} (ref 1.1–2.2)
ALP SERPL-CCNC: 59 U/L (ref 45–117)
ALT SERPL-CCNC: 27 U/L (ref 12–78)
ANION GAP SERPL CALC-SCNC: 9 MMOL/L (ref 5–15)
AST SERPL-CCNC: 22 U/L (ref 15–37)
BILIRUB SERPL-MCNC: 1.5 MG/DL (ref 0.2–1)
BUN SERPL-MCNC: 43 MG/DL (ref 6–20)
BUN/CREAT SERPL: 19 (ref 12–20)
CALCIUM SERPL-MCNC: 10.5 MG/DL (ref 8.5–10.1)
CHLORIDE SERPL-SCNC: 107 MMOL/L (ref 97–108)
CHOLEST SERPL-MCNC: 90 MG/DL
CK SERPL-CCNC: 159 U/L (ref 39–308)
CO2 SERPL-SCNC: 24 MMOL/L (ref 21–32)
CREAT SERPL-MCNC: 2.26 MG/DL (ref 0.7–1.3)
EST. AVERAGE GLUCOSE BLD GHB EST-MCNC: 151 MG/DL
GLOBULIN SER CALC-MCNC: 3.2 G/DL (ref 2–4)
GLUCOSE SERPL-MCNC: 84 MG/DL (ref 65–100)
HBA1C MFR BLD: 6.9 % (ref 4–5.6)
HDLC SERPL-MCNC: 42 MG/DL
HDLC SERPL: 2.1 {RATIO} (ref 0–5)
LDLC SERPL CALC-MCNC: 26.4 MG/DL (ref 0–100)
POTASSIUM SERPL-SCNC: 5.1 MMOL/L (ref 3.5–5.1)
PROT SERPL-MCNC: 7.7 G/DL (ref 6.4–8.2)
SODIUM SERPL-SCNC: 140 MMOL/L (ref 136–145)
TRIGL SERPL-MCNC: 108 MG/DL (ref ?–150)
VLDLC SERPL CALC-MCNC: 21.6 MG/DL

## 2022-05-10 RX ORDER — METFORMIN HYDROCHLORIDE 500 MG/1
TABLET, EXTENDED RELEASE ORAL
Qty: 360 TABLET | Refills: 3 | Status: SHIPPED | OUTPATIENT
Start: 2022-05-10

## 2022-05-10 NOTE — TELEPHONE ENCOUNTER
RX refill request from the patient/pharmacy. Patient last seen 03- with labs, and next appt. scheduled for 06-  Requested Prescriptions     Pending Prescriptions Disp Refills    metFORMIN ER (GLUCOPHAGE XR) 500 mg tablet [Pharmacy Med Name: metFORMIN HCl  MG Oral Tablet Extended Release 24 Hour] 360 Tablet 3     Sig: Take 2 tablets by mouth twice daily   .

## 2022-06-29 NOTE — PROGRESS NOTES
Chief Complaint   Patient presents with    Osteoarthritis     3 month    GERD    Diabetes    Cholesterol Problem    Chronic Kidney Disease    Hypertension       SUBJECTIVE:    Akilah Cruz is a 80 y.o. male who returns in follow-up for his medical problems include hypertension, diabetes, hyperlipidemia, DJD, CKD stage III, obesity and other multiple medical problems. He is taking his medication trying to follow his diet and try and remain physically active. He currently denies any chest pain, shortness of breath, palpitations, PND, orthopnea or other cardiac or respiratory complaints. He notes no current GI or  complaints. He notes no headaches, dizziness or neurologic complaints. He has no current change of his chronic arthritic complaints and no other complaints on complete. Systems. Current Outpatient Medications   Medication Sig Dispense Refill    metFORMIN ER (GLUCOPHAGE XR) 500 mg tablet Take 2 tablets by mouth twice daily 360 Tablet 3    chlorthalidone (HYGROTON) 25 mg tablet Take 1 tablet by mouth once daily 90 Tablet 1    lisinopriL (PRINIVIL, ZESTRIL) 40 mg tablet Take 1 tablet by mouth once daily 90 Tablet 3    glimepiride (AMARYL) 4 mg tablet TAKE 1 TABLET BY MOUTH IN THE MORNING 90 Tablet 3    amLODIPine (NORVASC) 5 mg tablet Take 1 tablet by mouth once daily 90 Tablet 3    simvastatin (ZOCOR) 20 mg tablet TAKE 1 TABLET BY MOUTH ONCE DAILY AT NIGHT 90 Tablet 3    ezetimibe (ZETIA) 10 mg tablet TAKE 1 TABLET BY MOUTH ONCE DAILY AT BEDTIME 90 Tablet 3    losartan (COZAAR) 100 mg tablet Take 1 tablet by mouth once daily 90 Tablet 3    JANUVIA 100 mg tablet TAKE ONE TABLET BY MOUTH ONCE DAILY 90 Tab 3    aspirin delayed-release 81 mg tablet Take  by mouth daily.  Omega-3-DHA-EPA-Fish Oil 1,000 mg (120 mg-180 mg) cap Take 1,500 mg by mouth daily.  nicotinic acid (NIACIN) 500 mg tablet Take 500 mg by mouth Daily (before breakfast).        Past Medical History: Diagnosis Date    Cancer [facility])     prostate    Cardiac arrhythmia 2017    Colon cancer screening 2017    Diabetes ([facility]     Type II    Diabetes ([facility]     DJD (degenerative joint disease) 2017    Former smoker 2017    Hypercholesterolemia     Hyperlipidemia 2017    Hypertension     Hypertension with renal disease 2017    On statin therapy 2017    Polymyalgia rheumatica ([facility] 2017     Past Surgical History:   Procedure Laterality Date    HX PROSTATECTOMY          HX PROSTATECTOMY       No Known Allergies    REVIEW OF SYSTEMS:  General: negative for - chills or fever, or weight loss or gain  ENT: negative for - headaches, nasal congestion or tinnitus  Eyes: no blurred or visual changes  Neck: No stiffness or swollen nodes  Respiratory: negative for - cough, hemoptysis, shortness of breath or wheezing  Cardiovascular : negative for - chest pain, edema, palpitations or shortness of breath  Gastrointestinal: negative for - abdominal pain, blood in stools, heartburn or nausea/vomiting  Genito-Urinary: no dysuria, trouble voiding, or hematuria  Musculoskeletal: negative for - gait disturbance, joint pain, joint stiffness or joint swelling  Neurological: no TIA or stroke symptoms  Hematologic: no bruises, no bleeding  Lymphatic: no swollen glands  Integument: no lumps, mole changes, nail changes or rash  Endocrine:no malaise/lethargy poly uria or polydipsia or unexpected weight changes        Social History     Socioeconomic History    Marital status:    Tobacco Use    Smoking status: Former Smoker     Packs/day: 1.00     Years: 50.00     Pack years: 50.00     Types: Cigarettes     Start date: 1955     Quit date: 2008     Years since quittin.9    Smokeless tobacco: Never Used   Vaping Use    Vaping Use: Never used   Substance and Sexual Activity    Alcohol use: No    Drug use: No    Sexual activity: Never   Social History Narrative ** Merged History Encounter **          Family History   Problem Relation Age of Onset    Heart Disease Mother     Cancer Father        OBJECTIVE:     Visit Vitals  /74 (BP 1 Location: Left upper arm, BP Patient Position: Sitting, BP Cuff Size: Adult)   Pulse 81   Temp 97.9 °F (36.6 °C) (Oral)   Resp 16   Ht 5' 7\" (1.702 m)   Wt 224 lb 12.8 oz (102 kg)   SpO2 94%   BMI 35.21 kg/m²     CONSTITUTIONAL:   well nourished, appears age appropriate  EYES: sclera anicteric, PERRL, EOMI  ENMT:nares clear, moist mucous membranes, pharynx clear  NECK: supple. Thyroid normal, No JVD or bruits  RESPIRATORY: Chest: clear to ascultation and percussion, normal inspiratory effort  CARDIOVASCULAR: Heart: regular rate and rhythm no murmurs, rubs or gallops, PMI not displaced, No thrills, no peripheral edema  GASTROINTESTINAL: Abdomen: non distended, soft, non tender, bowel sounds normal  HEMATOLOGIC: no purpura, petechiae or bruising  LYMPHATIC: No lymph node enlargemant  MUSCULOSKELETAL: Extremities: no active synovitis, pulse 1+   INTEGUMENT: No unusual rashes or suspicious skin lesions noted. Nails appear normal.  PERIPHERAL VASCULAR: normal pulses femoral, PT and DP  NEUROLOGIC: non-focal exam, A & O X 3  PSYCHIATRIC:, appropriate affect     ASSESSMENT:   1. Essential hypertension    2. Controlled type 2 diabetes mellitus with stage 3 chronic kidney disease, without long-term current use of insulin ([address]    3. Mixed hyperlipidemia    4. Primary osteoarthritis involving multiple joints    5. Severe obesity (BMI 35.0-39. 9) with comorbidity ([address]    6. Stage 3 chronic kidney disease, unspecified whether stage 3a or 3b CKD (HCC)      Impression  1. Hypertension that is controlled so continue current therapy reviewed with him. 2.  Diabetes repeat status pending and prior lab reviewed and I will adjust if needed. 3.  Hyperlipidemia prior lab reviewed repeat status pending   4. DJD that is stable   5.   Obesity I did discuss diet, exercise weight reduction for overall health benefit in note his weight is down a couple pounds. 6.  CKD stage III repeat status pending  I will call the lab and make further recommendations or adjustments if necessary. Follow-up in 3 months or sooner if there is a problem. PLAN:  .  Orders Placed This Encounter    METABOLIC PANEL, COMPREHENSIVE    LIPID PANEL    CK    HEMOGLOBIN A1C WITH EAG         ATTENTION:   This medical record was transcribed using an electronic medical records system. Although proofread, it may and can contain electronic and spelling errors. Other human spelling and other errors may be present. Corrections may be executed at a later time. Please feel free to contact us for any clarifications as needed. Follow-up and Dispositions    · Return in about 3 months (around 9/30/2022). No results found for any visits on 06/30/22. Veronika Umana MD    The patient verbalized understanding of the problems and plans as explained.

## 2022-06-30 ENCOUNTER — OFFICE VISIT (OUTPATIENT)
Dept: INTERNAL MEDICINE CLINIC | Age: 87
End: 2022-06-30
Payer: MEDICARE

## 2022-06-30 VITALS
SYSTOLIC BLOOD PRESSURE: 130 MMHG | HEART RATE: 81 BPM | HEIGHT: 67 IN | OXYGEN SATURATION: 94 % | TEMPERATURE: 97.9 F | DIASTOLIC BLOOD PRESSURE: 74 MMHG | BODY MASS INDEX: 35.28 KG/M2 | RESPIRATION RATE: 16 BRPM | WEIGHT: 224.8 LBS

## 2022-06-30 DIAGNOSIS — E66.01 SEVERE OBESITY (BMI 35.0-39.9) WITH COMORBIDITY (HCC): ICD-10-CM

## 2022-06-30 DIAGNOSIS — M15.9 PRIMARY OSTEOARTHRITIS INVOLVING MULTIPLE JOINTS: ICD-10-CM

## 2022-06-30 DIAGNOSIS — N18.30 STAGE 3 CHRONIC KIDNEY DISEASE, UNSPECIFIED WHETHER STAGE 3A OR 3B CKD (HCC): ICD-10-CM

## 2022-06-30 DIAGNOSIS — E11.22 CONTROLLED TYPE 2 DIABETES MELLITUS WITH STAGE 3 CHRONIC KIDNEY DISEASE, WITHOUT LONG-TERM CURRENT USE OF INSULIN (HCC): ICD-10-CM

## 2022-06-30 DIAGNOSIS — I10 ESSENTIAL HYPERTENSION: Primary | ICD-10-CM

## 2022-06-30 DIAGNOSIS — N18.30 CONTROLLED TYPE 2 DIABETES MELLITUS WITH STAGE 3 CHRONIC KIDNEY DISEASE, WITHOUT LONG-TERM CURRENT USE OF INSULIN (HCC): ICD-10-CM

## 2022-06-30 DIAGNOSIS — E78.2 MIXED HYPERLIPIDEMIA: ICD-10-CM

## 2022-06-30 LAB
ALBUMIN SERPL-MCNC: 4.1 G/DL (ref 3.5–5)
ALBUMIN/GLOB SERPL: 1.2 {RATIO} (ref 1.1–2.2)
ALP SERPL-CCNC: 51 U/L (ref 45–117)
ALT SERPL-CCNC: 21 U/L (ref 12–78)
ANION GAP SERPL CALC-SCNC: 8 MMOL/L (ref 5–15)
AST SERPL-CCNC: 21 U/L (ref 15–37)
BILIRUB SERPL-MCNC: 1.3 MG/DL (ref 0.2–1)
BUN SERPL-MCNC: 42 MG/DL (ref 6–20)
BUN/CREAT SERPL: 19 (ref 12–20)
CALCIUM SERPL-MCNC: 10.3 MG/DL (ref 8.5–10.1)
CHLORIDE SERPL-SCNC: 108 MMOL/L (ref 97–108)
CHOLEST SERPL-MCNC: 102 MG/DL
CK SERPL-CCNC: 116 U/L (ref 39–308)
CO2 SERPL-SCNC: 23 MMOL/L (ref 21–32)
CREAT SERPL-MCNC: 2.22 MG/DL (ref 0.7–1.3)
EST. AVERAGE GLUCOSE BLD GHB EST-MCNC: 143 MG/DL
GLOBULIN SER CALC-MCNC: 3.5 G/DL (ref 2–4)
GLUCOSE SERPL-MCNC: 130 MG/DL (ref 65–100)
HBA1C MFR BLD: 6.6 % (ref 4–5.6)
HDLC SERPL-MCNC: 41 MG/DL
HDLC SERPL: 2.5 {RATIO} (ref 0–5)
LDLC SERPL CALC-MCNC: 35.4 MG/DL (ref 0–100)
POTASSIUM SERPL-SCNC: 4.8 MMOL/L (ref 3.5–5.1)
PROT SERPL-MCNC: 7.6 G/DL (ref 6.4–8.2)
SODIUM SERPL-SCNC: 139 MMOL/L (ref 136–145)
TRIGL SERPL-MCNC: 128 MG/DL (ref ?–150)
VLDLC SERPL CALC-MCNC: 25.6 MG/DL

## 2022-06-30 PROCEDURE — G8417 CALC BMI ABV UP PARAM F/U: HCPCS | Performed by: INTERNAL MEDICINE

## 2022-06-30 PROCEDURE — 99214 OFFICE O/P EST MOD 30 MIN: CPT | Performed by: INTERNAL MEDICINE

## 2022-06-30 PROCEDURE — 1123F ACP DISCUSS/DSCN MKR DOCD: CPT | Performed by: INTERNAL MEDICINE

## 2022-06-30 PROCEDURE — G8510 SCR DEP NEG, NO PLAN REQD: HCPCS | Performed by: INTERNAL MEDICINE

## 2022-06-30 PROCEDURE — 1101F PT FALLS ASSESS-DOCD LE1/YR: CPT | Performed by: INTERNAL MEDICINE

## 2022-06-30 PROCEDURE — G8427 DOCREV CUR MEDS BY ELIG CLIN: HCPCS | Performed by: INTERNAL MEDICINE

## 2022-06-30 PROCEDURE — 3044F HG A1C LEVEL LT 7.0%: CPT | Performed by: INTERNAL MEDICINE

## 2022-06-30 PROCEDURE — G8536 NO DOC ELDER MAL SCRN: HCPCS | Performed by: INTERNAL MEDICINE

## 2022-06-30 NOTE — PROGRESS NOTES
HIPAA verified by two patient identifiers. Jerson Valdes is a 80 y.o. male    Chief Complaint   Patient presents with    Osteoarthritis     3 month    GERD    Diabetes    Cholesterol Problem    Chronic Kidney Disease    Hypertension       Visit Vitals  /74 (BP 1 Location: Left upper arm, BP Patient Position: Sitting, BP Cuff Size: Adult)   Pulse 81   Temp 97.9 °F (36.6 °C) (Oral)   Resp 16   Ht 5' 7\" (1.702 m)   Wt 224 lb 12.8 oz (102 kg)   SpO2 94%   BMI 35.21 kg/m²       Pain Scale: 0 - No pain/10  Pain Location:       Health Maintenance Due   Topic Date Due    DTaP/Tdap/Td series (1 - Tdap) Never done    Shingrix Vaccine Age 50> (1 of 2) Never done    Eye Exam Retinal or Dilated  02/09/2020         Coordination of Care Questionnaire:  :   1) Have you been to an emergency room, urgent care, or hospitalized since your last visit? If yes, where when, and reason for visit? no       2. Have seen or consulted any other health care provider since your last visit? If yes, where when, and reason for visit? NO      Patient is accompanied by self I have received verbal consent from Jerson Valdes to discuss any/all medical information while they are present in the room.

## 2022-06-30 NOTE — PATIENT INSTRUCTIONS
Body Mass Index: Care Instructions  Your Care Instructions     Body mass index (BMI) can help you see if your weight is raising your risk for health problems. It uses a formula to compare how much you weigh with how tall you are. · A BMI lower than 18.5 is considered underweight. · A BMI between 18.5 and 24.9 is considered healthy. · A BMI between 25 and 29.9 is considered overweight. A BMI of 30 or higher is considered obese. If your BMI is in the normal range, it means that you have a lower risk for weight-related health problems. If your BMI is in the overweight or obese range, you may be at increased risk for weight-related health problems, such as high blood pressure, heart disease, stroke, arthritis or joint pain, and diabetes. If your BMI is in the underweight range, you may be at increased risk for health problems such as fatigue, lower protection (immunity) against illness, muscle loss, bone loss, hair loss, and hormone problems. BMI is just one measure of your risk for weight-related health problems. You may be at higher risk for health problems if you are not active, you eat an unhealthy diet, or you drink too much alcohol or use tobacco products. Follow-up care is a key part of your treatment and safety. Be sure to make and go to all appointments, and call your doctor if you are having problems. It's also a good idea to know your test results and keep a list of the medicines you take. How can you care for yourself at home? · Practice healthy eating habits. This includes eating plenty of fruits, vegetables, whole grains, lean protein, and low-fat dairy. · If your doctor recommends it, get more exercise. Walking is a good choice. Bit by bit, increase the amount you walk every day. Try for at least 30 minutes on most days of the week. · Do not smoke. Smoking can increase your risk for health problems. If you need help quitting, talk to your doctor about stop-smoking programs and medicines. These can increase your chances of quitting for good. · Limit alcohol to 2 drinks a day for men and 1 drink a day for women. Too much alcohol can cause health problems. If you have a BMI higher than 25  · Your doctor may do other tests to check your risk for weight-related health problems. This may include measuring the distance around your waist. A waist measurement of more than 40 inches in men or 35 inches in women can increase the risk of weight-related health problems. · Talk with your doctor about steps you can take to stay healthy or improve your health. You may need to make lifestyle changes to lose weight and stay healthy, such as changing your diet and getting regular exercise. If you have a BMI lower than 18.5  · Your doctor may do other tests to check your risk for health problems. · Talk with your doctor about steps you can take to stay healthy or improve your health. You may need to make lifestyle changes to gain or maintain weight and stay healthy, such as getting more healthy foods in your diet and doing exercises to build muscle. Where can you learn more? Go to http://www.lopez.com/  Enter S176 in the search box to learn more about \"Body Mass Index: Care Instructions. \"  Current as of: December 27, 2021               Content Version: 13.2  © 2006-2022 [employer]. Care instructions adapted under license by [employer] (which disclaims liability or warranty for this information). If you have questions about a medical condition or this instruction, always ask your healthcare professional. [place] [address] any warranty or liability for your use of this information.

## 2022-08-17 RX ORDER — CHLORTHALIDONE 25 MG/1
TABLET ORAL
Qty: 90 TABLET | Refills: 2 | Status: SHIPPED | OUTPATIENT
Start: 2022-08-17

## 2022-08-17 NOTE — TELEPHONE ENCOUNTER
PCP: Grazyna Espinoza MD    Last appt: 6/30/2022  Future Appointments   Date Time Provider Laura Lorenzana   9/30/2022 10:20 AM Grazyna Espinoza MD [facility] AM[facility]       Last refilled:2/16/22    Requested Prescriptions     Pending Prescriptions Disp Refills    chlorthalidone (HYGROTON) 25 mg tablet [Pharmacy Med Name: Chlorthalidone 25 MG Oral Tablet] 90 Tablet 2     Sig: Take 1 tablet by mouth once daily

## 2022-09-29 NOTE — PROGRESS NOTES
Chief Complaint   Patient presents with    Follow-up     3 month       SUBJECTIVE:    Fay Sparks is a 80 y.o. male who returns in follow-up his medical problems include hypertension, diabetes, hyperlipidemia, DJD, prostate cancer, CKD stage III, obesity and other medical problems. He is taking his medications trying to follow his diet try and remain physically active. Currently denies any chest pain, shortness of breath or cardiac respiratory complaints. He notes no GI or  complaints. He notes no headaches, dizziness or neurologic complaints. He has no current active arthritic complaints and there are no other complaints on complete view of systems. Current Outpatient Medications   Medication Sig Dispense Refill    chlorthalidone (HYGROTON) 25 mg tablet Take 1 tablet by mouth once daily 90 Tablet 2    metFORMIN ER (GLUCOPHAGE XR) 500 mg tablet Take 2 tablets by mouth twice daily 360 Tablet 3    lisinopriL (PRINIVIL, ZESTRIL) 40 mg tablet Take 1 tablet by mouth once daily 90 Tablet 3    glimepiride (AMARYL) 4 mg tablet TAKE 1 TABLET BY MOUTH IN THE MORNING 90 Tablet 3    amLODIPine (NORVASC) 5 mg tablet Take 1 tablet by mouth once daily 90 Tablet 3    simvastatin (ZOCOR) 20 mg tablet TAKE 1 TABLET BY MOUTH ONCE DAILY AT NIGHT 90 Tablet 3    ezetimibe (ZETIA) 10 mg tablet TAKE 1 TABLET BY MOUTH ONCE DAILY AT BEDTIME 90 Tablet 3    losartan (COZAAR) 100 mg tablet Take 1 tablet by mouth once daily 90 Tablet 3    JANUVIA 100 mg tablet TAKE ONE TABLET BY MOUTH ONCE DAILY 90 Tab 3    aspirin delayed-release 81 mg tablet Take  by mouth daily. Omega-3-DHA-EPA-Fish Oil 1,000 mg (120 mg-180 mg) cap Take 1,500 mg by mouth daily. nicotinic acid (NIACIN) 500 mg tablet Take 500 mg by mouth Daily (before breakfast).        Past Medical History:   Diagnosis Date    Cancer [facility])     prostate    Cardiac arrhythmia 7/20/2017    Colon cancer screening 7/20/2017    Diabetes [facility]     Type II    Diabetes ([facility]     DJD (degenerative joint disease) 2017    Former smoker 2017    Hypercholesterolemia     Hyperlipidemia 2017    Hypertension     Hypertension with renal disease 2017    On statin therapy 2017    [facility] 2017     Past Surgical History:   Procedure Laterality Date    HX PROSTATECTOMY      1969    HX PROSTATECTOMY       No Known Allergies    REVIEW OF SYSTEMS:  General: negative for - chills or fever, or weight loss or gain  ENT: negative for - headaches, nasal congestion or tinnitus  Eyes: no blurred or visual changes  Neck: No stiffness or swollen nodes  Respiratory: negative for - cough, hemoptysis, shortness of breath or wheezing  Cardiovascular : negative for - chest pain, edema, palpitations or shortness of breath  Gastrointestinal: negative for - abdominal pain, blood in stools, heartburn or nausea/vomiting  Genito-Urinary: no dysuria, trouble voiding, or hematuria  Musculoskeletal: negative for - gait disturbance, joint pain, joint stiffness or joint swelling  Neurological: no TIA or stroke symptoms  Hematologic: no bruises, no bleeding  Lymphatic: no swollen glands  Integument: no lumps, mole changes, nail changes or rash  Endocrine:no malaise/lethargy poly uria or polydipsia or unexpected weight changes        Social History     Socioeconomic History    Marital status:    Tobacco Use    Smoking status: Former     Packs/day: 1.00     Years: 50.00     Pack years: 50.00     Types: Cigarettes     Start date: 1955     Quit date: 2008     Years since quittin.2    Smokeless tobacco: Never   Vaping Use    Vaping Use: Never used   Substance and Sexual Activity    Alcohol use: No    Drug use: No    Sexual activity: Never   Social History Narrative    ** Merged History Encounter **          Family History   Problem Relation Age of Onset    Heart Disease Mother     Cancer Father        OBJECTIVE:     Visit Vitals  /82 (BP 1 Location: Left arm, BP Patient Position: Sitting)   Pulse 62   Temp 97.6 °F (36.4 °C) (Oral)   Resp 16   Ht 5' 7\" (1.702 m)   Wt 223 lb 12.8 oz (101.5 kg)   SpO2 96%   BMI 35.05 kg/m²     CONSTITUTIONAL:   well nourished, appears age appropriate  EYES: sclera anicteric, PERRL, EOMI  ENMT:nares clear, moist mucous membranes, pharynx clear  NECK: supple. Thyroid normal, No JVD or bruits  RESPIRATORY: Chest: clear to ascultation and percussion, normal inspiratory effort  CARDIOVASCULAR: Heart: regular rate and rhythm no murmurs, rubs or gallops, PMI not displaced, No thrills, no peripheral edema  GASTROINTESTINAL: Abdomen: non distended, soft, non tender, bowel sounds normal  HEMATOLOGIC: no purpura, petechiae or bruising  LYMPHATIC: No lymph node enlargemant  MUSCULOSKELETAL: Extremities: no active synovitis, pulse 1+   INTEGUMENT: No unusual rashes or suspicious skin lesions noted. Nails appear normal.  PERIPHERAL VASCULAR: normal pulses femoral, PT and DP  NEUROLOGIC: non-focal exam, A & O X 3  PSYCHIATRIC:, appropriate affect     ASSESSMENT:   1. Essential hypertension    2. Controlled type 2 diabetes mellitus with stage 3 chronic kidney disease, without long-term current use of insulin ([address]    3. Mixed hyperlipidemia    4. Primary osteoarthritis involving multiple joints    5. Stage 3 chronic kidney disease, unspecified whether stage 3a or 3b CKD ([address]    6. Severe obesity (BMI 35.0-39. 9) with comorbidity ([address]      Impression  1. Hypertension is controlled so continue current treatment  2. Diabetes repeat status pending prior lab reviewed  3. Hyperlipidemia prior lab reviewed repeat status pending  4. DJD stable  5. CKD stage III repeat status pending  6 obesity we did discuss diet, exercise weight reduction for overall health benefit  Follow-up in 3 months or sooner if there is a problem. I will call with lab results in the interim.     PLAN:  .  Orders Placed This Encounter    METABOLIC PANEL, COMPREHENSIVE LIPID PANEL    CK    HEMOGLOBIN A1C WITH EAG         ATTENTION:   This medical record was transcribed using an electronic medical records system. Although proofread, it may and can contain electronic and spelling errors. Other human spelling and other errors may be present. Corrections may be executed at a later time. Please feel free to contact us for any clarifications as needed. Follow-up and Dispositions    Return in about 3 months (around 12/30/2022). No results found for any visits on 09/30/22. Eric Corbin MD    The patient verbalized understanding of the problems and plans as explained.

## 2022-09-30 ENCOUNTER — OFFICE VISIT (OUTPATIENT)
Dept: INTERNAL MEDICINE CLINIC | Age: 87
End: 2022-09-30
Payer: MEDICARE

## 2022-09-30 VITALS
BODY MASS INDEX: 35.12 KG/M2 | SYSTOLIC BLOOD PRESSURE: 136 MMHG | DIASTOLIC BLOOD PRESSURE: 82 MMHG | OXYGEN SATURATION: 96 % | WEIGHT: 223.8 LBS | RESPIRATION RATE: 16 BRPM | HEIGHT: 67 IN | TEMPERATURE: 97.6 F | HEART RATE: 62 BPM

## 2022-09-30 DIAGNOSIS — E78.2 MIXED HYPERLIPIDEMIA: ICD-10-CM

## 2022-09-30 DIAGNOSIS — I10 ESSENTIAL HYPERTENSION: Primary | ICD-10-CM

## 2022-09-30 DIAGNOSIS — E11.22 CONTROLLED TYPE 2 DIABETES MELLITUS WITH STAGE 3 CHRONIC KIDNEY DISEASE, WITHOUT LONG-TERM CURRENT USE OF INSULIN (HCC): ICD-10-CM

## 2022-09-30 DIAGNOSIS — M15.9 PRIMARY OSTEOARTHRITIS INVOLVING MULTIPLE JOINTS: ICD-10-CM

## 2022-09-30 DIAGNOSIS — N18.30 CONTROLLED TYPE 2 DIABETES MELLITUS WITH STAGE 3 CHRONIC KIDNEY DISEASE, WITHOUT LONG-TERM CURRENT USE OF INSULIN (HCC): ICD-10-CM

## 2022-09-30 DIAGNOSIS — N18.30 STAGE 3 CHRONIC KIDNEY DISEASE, UNSPECIFIED WHETHER STAGE 3A OR 3B CKD (HCC): ICD-10-CM

## 2022-09-30 DIAGNOSIS — E66.01 SEVERE OBESITY (BMI 35.0-39.9) WITH COMORBIDITY (HCC): ICD-10-CM

## 2022-09-30 PROCEDURE — 1101F PT FALLS ASSESS-DOCD LE1/YR: CPT | Performed by: INTERNAL MEDICINE

## 2022-09-30 PROCEDURE — G8417 CALC BMI ABV UP PARAM F/U: HCPCS | Performed by: INTERNAL MEDICINE

## 2022-09-30 PROCEDURE — 99214 OFFICE O/P EST MOD 30 MIN: CPT | Performed by: INTERNAL MEDICINE

## 2022-09-30 PROCEDURE — G8427 DOCREV CUR MEDS BY ELIG CLIN: HCPCS | Performed by: INTERNAL MEDICINE

## 2022-09-30 PROCEDURE — 1123F ACP DISCUSS/DSCN MKR DOCD: CPT | Performed by: INTERNAL MEDICINE

## 2022-09-30 PROCEDURE — G8510 SCR DEP NEG, NO PLAN REQD: HCPCS | Performed by: INTERNAL MEDICINE

## 2022-09-30 PROCEDURE — 3044F HG A1C LEVEL LT 7.0%: CPT | Performed by: INTERNAL MEDICINE

## 2022-09-30 PROCEDURE — G8536 NO DOC ELDER MAL SCRN: HCPCS | Performed by: INTERNAL MEDICINE

## 2022-09-30 NOTE — PROGRESS NOTES
Room: E3    Identified pt with two pt identifiers(name and ). Reviewed record in preparation for visit and have obtained necessary documentation. All patient medications has been reviewed. Chief Complaint   Patient presents with    Follow-up     3 month       Health Maintenance Due   Topic    DTaP/Tdap/Td series (1 - Tdap)    Shingrix Vaccine Age 50> (1 of 2)    Eye Exam Retinal or Dilated     COVID-19 Vaccine (4 - Booster for Moderna series)    Flu Vaccine (1)       Vitals:    22 1054   BP: 136/82   Pulse: 62   Resp: 16   Temp: 97.6 °F (36.4 °C)   TempSrc: Oral   SpO2: 96%   Weight: 223 lb 12.8 oz (101.5 kg)   Height: 5' 7\" (1.702 m)   PainSc:   0 - No pain       4. Have you been to the ER, urgent care clinic since your last visit? Hospitalized since your last visit? No    5. Have you seen or consulted any other health care providers outside of the [address] [place] [address] since your last visit? Include any pap smears or colon screening. No    Patient is accompanied by self I have received verbal consent from Antony Guy to discuss any/all medical information while they are present in the room.

## 2022-10-01 LAB
ALBUMIN SERPL-MCNC: 4.1 G/DL (ref 3.5–5)
ALBUMIN/GLOB SERPL: 1.2 {RATIO} (ref 1.1–2.2)
ALP SERPL-CCNC: 60 U/L (ref 45–117)
ALT SERPL-CCNC: 28 U/L (ref 12–78)
ANION GAP SERPL CALC-SCNC: 5 MMOL/L (ref 5–15)
AST SERPL-CCNC: 24 U/L (ref 15–37)
BILIRUB SERPL-MCNC: 1.2 MG/DL (ref 0.2–1)
BUN SERPL-MCNC: 33 MG/DL (ref 6–20)
BUN/CREAT SERPL: 16 (ref 12–20)
CALCIUM SERPL-MCNC: 10.1 MG/DL (ref 8.5–10.1)
CHLORIDE SERPL-SCNC: 109 MMOL/L (ref 97–108)
CHOLEST SERPL-MCNC: 88 MG/DL
CK SERPL-CCNC: 159 U/L (ref 39–308)
CO2 SERPL-SCNC: 26 MMOL/L (ref 21–32)
CREAT SERPL-MCNC: 2.08 MG/DL (ref 0.7–1.3)
EST. AVERAGE GLUCOSE BLD GHB EST-MCNC: 146 MG/DL
GLOBULIN SER CALC-MCNC: 3.4 G/DL (ref 2–4)
GLUCOSE SERPL-MCNC: 123 MG/DL (ref 65–100)
HBA1C MFR BLD: 6.7 % (ref 4–5.6)
HDLC SERPL-MCNC: 43 MG/DL
HDLC SERPL: 2 {RATIO} (ref 0–5)
LDLC SERPL CALC-MCNC: 23 MG/DL (ref 0–100)
POTASSIUM SERPL-SCNC: 4.8 MMOL/L (ref 3.5–5.1)
PROT SERPL-MCNC: 7.5 G/DL (ref 6.4–8.2)
SODIUM SERPL-SCNC: 140 MMOL/L (ref 136–145)
TRIGL SERPL-MCNC: 110 MG/DL (ref ?–150)
VLDLC SERPL CALC-MCNC: 22 MG/DL

## 2022-11-17 DIAGNOSIS — E78.2 MIXED HYPERLIPIDEMIA: ICD-10-CM

## 2022-11-17 DIAGNOSIS — I12.9 HYPERTENSIVE KIDNEY DISEASE WITH STAGE 3 CHRONIC KIDNEY DISEASE, UNSPECIFIED WHETHER STAGE 3A OR 3B CKD (HCC): ICD-10-CM

## 2022-11-17 DIAGNOSIS — N18.30 HYPERTENSIVE KIDNEY DISEASE WITH STAGE 3 CHRONIC KIDNEY DISEASE, UNSPECIFIED WHETHER STAGE 3A OR 3B CKD (HCC): ICD-10-CM

## 2022-11-17 DIAGNOSIS — E78.5 HYPERLIPIDEMIA, UNSPECIFIED HYPERLIPIDEMIA TYPE: ICD-10-CM

## 2022-11-17 DIAGNOSIS — I10 ESSENTIAL HYPERTENSION: ICD-10-CM

## 2022-11-17 RX ORDER — GLIMEPIRIDE 4 MG/1
TABLET ORAL
Qty: 90 TABLET | Refills: 3 | Status: SHIPPED | OUTPATIENT
Start: 2022-11-17

## 2022-11-17 RX ORDER — LISINOPRIL 40 MG/1
TABLET ORAL
Qty: 90 TABLET | Refills: 3 | Status: SHIPPED | OUTPATIENT
Start: 2022-11-17 | End: 2022-11-18 | Stop reason: SDUPTHER

## 2022-11-17 RX ORDER — SIMVASTATIN 20 MG/1
TABLET, FILM COATED ORAL
Qty: 90 TABLET | Refills: 3 | Status: SHIPPED | OUTPATIENT
Start: 2022-11-17

## 2022-11-17 RX ORDER — EZETIMIBE 10 MG/1
TABLET ORAL
Qty: 90 TABLET | Refills: 3 | Status: SHIPPED | OUTPATIENT
Start: 2022-11-17

## 2022-11-17 RX ORDER — LOSARTAN POTASSIUM 100 MG/1
TABLET ORAL
Qty: 90 TABLET | Refills: 3 | Status: SHIPPED | OUTPATIENT
Start: 2022-11-17

## 2022-11-17 RX ORDER — AMLODIPINE BESYLATE 5 MG/1
TABLET ORAL
Qty: 90 TABLET | Refills: 3 | Status: SHIPPED | OUTPATIENT
Start: 2022-11-17

## 2022-11-17 NOTE — TELEPHONE ENCOUNTER
RX refill request from the patient/pharmacy. Patient last seen 09- with labs, and next appt. scheduled for 12-  Requested Prescriptions     Pending Prescriptions Disp Refills    lisinopriL (PRINIVIL, ZESTRIL) 40 mg tablet [Pharmacy Med Name: Lisinopril 40 MG Oral Tablet] 90 Tablet 3     Sig: Take 1 tablet by mouth once daily    losartan (COZAAR) 100 mg tablet [Pharmacy Med Name: Losartan Potassium 100 MG Oral Tablet] 90 Tablet 3     Sig: Take 1 tablet by mouth once daily    glimepiride (AMARYL) 4 mg tablet [Pharmacy Med Name: Glimepiride 4 MG Oral Tablet] 90 Tablet 3     Sig: TAKE 1 TABLET BY MOUTH IN THE MORNING    ezetimibe (ZETIA) 10 mg tablet [Pharmacy Med Name: Ezetimibe 10 MG Oral Tablet] 90 Tablet 3     Sig: TAKE 1 TABLET BY MOUTH ONCE DAILY AT BEDTIME    amLODIPine (NORVASC) 5 mg tablet [Pharmacy Med Name: amLODIPine Besylate 5 MG Oral Tablet] 90 Tablet 3     Sig: Take 1 tablet by mouth once daily    simvastatin (ZOCOR) 20 mg tablet [Pharmacy Med Name: Simvastatin 20 MG Oral Tablet] 90 Tablet 3     Sig: TAKE 1 TABLET BY MOUTH ONCE DAILY AT NIGHT    .

## 2022-11-18 DIAGNOSIS — I10 ESSENTIAL HYPERTENSION: ICD-10-CM

## 2022-11-18 NOTE — TELEPHONE ENCOUNTER
PCP: Shea Monroy MD    Last appt: 9/30/2022  Future Appointments   Date Time Provider Laura Lorenzana   12/1/2022  9:30 AM Shea Monroy MD [facility] AMB       Requested Prescriptions     Pending Prescriptions Disp Refills    lisinopriL (PRINIVIL, ZESTRIL) 40 mg tablet 90 Tablet 3     Sig: Take 1 Tablet by mouth daily.          Other Comments:

## 2022-11-19 RX ORDER — LISINOPRIL 40 MG/1
40 TABLET ORAL DAILY
Qty: 90 TABLET | Refills: 3 | Status: SHIPPED | OUTPATIENT
Start: 2022-11-19

## 2022-11-30 PROBLEM — Z00.00 MEDICARE ANNUAL WELLNESS VISIT, SUBSEQUENT: Status: ACTIVE | Noted: 2017-10-27

## 2022-11-30 NOTE — PROGRESS NOTES
This is a Subsequent Medicare Annual Wellness Visit providing Personalized Prevention Plan Services (PPPS) (Performed 12 months after initial AWV and PPPS )    I have reviewed the patient's medical history in detail and updated the computerized patient record. He returns today for his Medicare subsequent annual wellness examination and screening questionnaire. He is also in follow-up of his multiple medical problems include hypertension, diabetes, hyperlipidemia, prostate cancer post prostatectomy, history of cardiac arrhythmia with no recent symptoms, DJD, CKD stage III, history of polymyalgia rheumatica in remission, obesity, former smoking history and other multiple medical problems. He is taking his medications and trying to follow his diet and tries to remain physically active. He currently denies any chest pain, shortness of breath or cardiac respiratory complaints. He notes no current GI or  complaints. He has no headaches, dizziness or neurologic complaints. He has no current active arthritic complaints but he does have his chronic joint aches and pains. He has no other complaints on complete review of systems.     History     Past Medical History:   Diagnosis Date    Cancer [facility])     prostate    Cardiac arrhythmia 2017    Colon cancer screening 2017    Diabetes ([facility]     Type II    Diabetes ([facility]     DJD (degenerative joint disease) 2017    Former smoker 2017    Hypercholesterolemia     Hyperlipidemia 2017    Hypertension     Hypertension with renal disease 2017    On statin therapy 2017    Polymyalgia rheumatica ([facility] 2017      Past Surgical History:   Procedure Laterality Date    HX PROSTATECTOMY          HX PROSTATECTOMY       Social History     Tobacco Use    Smoking status: Former     Packs/day: 1.00     Years: 50.00     Pack years: 50.00     Types: Cigarettes     Start date: 1955     Quit date: 2008     Years since quittin.3 Smokeless tobacco: Never   Vaping Use    Vaping Use: Never used   Substance Use Topics    Alcohol use: No    Drug use: No     Current Outpatient Medications   Medication Sig Dispense Refill    lisinopriL (PRINIVIL, ZESTRIL) 40 mg tablet Take 1 Tablet by mouth daily. 90 Tablet 3    losartan (COZAAR) 100 mg tablet Take 1 tablet by mouth once daily 90 Tablet 3    glimepiride (AMARYL) 4 mg tablet TAKE 1 TABLET BY MOUTH IN THE MORNING 90 Tablet 3    ezetimibe (ZETIA) 10 mg tablet TAKE 1 TABLET BY MOUTH ONCE DAILY AT BEDTIME 90 Tablet 3    amLODIPine (NORVASC) 5 mg tablet Take 1 tablet by mouth once daily 90 Tablet 3    simvastatin (ZOCOR) 20 mg tablet TAKE 1 TABLET BY MOUTH ONCE DAILY AT NIGHT 90 Tablet 3    chlorthalidone (HYGROTON) 25 mg tablet Take 1 tablet by mouth once daily 90 Tablet 2    metFORMIN ER (GLUCOPHAGE XR) 500 mg tablet Take 2 tablets by mouth twice daily 360 Tablet 3    JANUVIA 100 mg tablet TAKE ONE TABLET BY MOUTH ONCE DAILY 90 Tab 3    aspirin delayed-release 81 mg tablet Take  by mouth daily. Omega-3-DHA-EPA-Fish Oil 1,000 mg (120 mg-180 mg) cap Take 1,500 mg by mouth daily. nicotinic acid (NIACIN) 500 mg tablet Take 500 mg by mouth Daily (before breakfast). No Known Allergies  Family History   Problem Relation Age of Onset    Heart Disease Mother     Cancer Father        Patient Active Problem List    Diagnosis    Essential hypertension    CKD (chronic kidney disease) stage 3, GFR 30-59 ml/min ([facility])    Severe obesity (BMI 35.0-39. 9) with comorbidity ([facility]    Controlled type 2 diabetes mellitus with stage 3 chronic kidney disease, without long-term current use of insulin ([facility])    Mixed hyperlipidemia    Primary osteoarthritis involving multiple joints    Prostate cancer ([facility] [facility]     S/P Prostatectomy        Polymyalgia rheumatica ([facility])    Vitamin D deficiency    Alcohol screening    Acute non-recurrent maxillary sinusitis    Medicare annual wellness visit, subsequent    Former smoker Cardiac arrhythmia       Patient Care Team:  Giancarlo Isidro MD as PCP - General (Internal Medicine Physician)  Giancarlo Isidro MD as PCP - Romy Coffey Provider    Depression Risk Factor Screening:     3 most recent PHQ Screens 12/1/2022   Little interest or pleasure in doing things Not at all   Feeling down, depressed, irritable, or hopeless Not at all   Total Score PHQ 2 0     Alcohol Risk Factor Screening: You do not drink alcohol or very rarely. Functional Ability and Level of Safety:     Fall Risk     Fall Risk Assessment, last 12 mths 12/1/2022   Able to walk? Yes   Fall in past 12 months? 0   Do you feel unsteady? 0   Are you worried about falling 0       Hearing Loss   mild    Activities of Daily Living   Self-care. ADL Assessment 12/1/2022   Feeding yourself No Help Needed   Getting from bed to chair No Help Needed   Getting dressed No Help Needed   Bathing or showering No Help Needed   Walk across the room (includes cane/walker) No Help Needed   Using the telphone No Help Needed   Taking your medications No Help Needed   Preparing meals No Help Needed   Managing money (expenses/bills) No Help Needed   Moderately strenuous housework (laundry) No Help Needed   Shopping for personal items (toiletries/medicines) No Help Needed   Shopping for groceries No Help Needed   Driving No Help Needed   Climbing a flight of stairs No Help Needed   Getting to places beyond walking distances No Help Needed       Abuse Screen   Patient is not abused    Social History     Social History Narrative    ** Merged History Encounter **            Review of Systems      ROS:    Constitutional: He denies fevers, weight loss, sweats. Eyes: No blurred or double vision. ENT: No difficulty with swallowing, taste, speech or smell. Neck: no stiffness or swelling  Respiratory: No cough wheezing or shortness of breath.   Cardiovascular: Denies chest pain, palpitations, unexplained indigestion or syncope. Gastrointestinal:  No changes in bowel movements, no abdominal pain, no bloating. Genitourinary:  He denies frequency, nocturia or stranguria. Extremities: No joint pain, stiffness or swelling. Neurological:  No numbness, tingling, burring paresthesias or loss of motor strength. No syncope, dizziness or frequent headache  Lymphatic: no adenopathy noted  Hematologic: no easy bruising or bleeding gums  Skin:  No recent rashes or mole changes. Psychiatric/Behavioral:  Negative for depression. Physical Examination     Evaluation of Cognitive Function:  Mood/affect:  happy  Appearance: age appropriate  Family member/caregiver input: None    Vitals:    12/01/22 0951 12/01/22 1028   BP: (!) 142/76 138/82   Pulse: 70    Resp: 16    Temp: 97.6 °F (36.4 °C)    TempSrc: Oral    SpO2: 95%    Weight: 223 lb (101.2 kg)    Height: 5' 7\" (1.702 m)    PainSc:   0 - No pain         PHYSICAL EXAM:    General appearance - alert, well appearing, and in no distress  Mental status - alert, oriented to person, place, and time  HEENT:  Ears - bilateral TM's and external ear canals clear  Eyes - pupillary responses were normal.  Extraocular muscle function intact. Lids and conjunctiva not injected. Fundoscopic exam revealed sharp disc margins. eye movements intact  Pharynx- clear with teeth in good repair. No masses were noted  Neck - supple without thyromegaly or burit. No JVD noted  Lungs - clear to auscultation and percussion  Cardiac- normal rate, regular rhythm without murmurs. PMI not displaced. No gallop, rub or click  Abdomen - flat, soft, non-tender without palpable organomegaly or mass. No pulsatile mass was felt, and not bruit was heard.   Bowel sounds were active  : Circumcised, Testes descended w/o masses  Rectal: normal sphincter tone, prostate 1+ enlarged, smooth and nontender without nodules, no masses, stool brown and hemacult negative  Extremities -  no clubbing cyanosis or edema  Lymphatics - no palpable lymphadenopathy, no hepatosplenomegaly  Hematologic: no petechiae or purpura  Peripheral vascular -Femoral, Dorsalis pedis and posterior tibial pulses felt without difficulty  Skin - no rash or unusual mole change noted  Neurological - Cranial nerves II-XII grossly intact. Motor strength 5/5. DTR's 2+ and symmetric. Station and gait normal  Back exam - full range of motion, no tenderness, palpable spasm or pain on motion  Musculoskeletal - no joint tenderness, deformity or swelling       Results for orders placed or performed in visit on 09/30/22   HEMOGLOBIN A1C WITH EAG   Result Value Ref Range    Hemoglobin A1c 6.7 (H) 4.0 - 5.6 %    Est. average glucose 146 mg/dL   CK   Result Value Ref Range     39 - 308 U/L   LIPID PANEL   Result Value Ref Range    Cholesterol, total 88 <200 MG/DL    Triglyceride 110 <150 MG/DL    HDL Cholesterol 43 MG/DL    LDL, calculated 23 0 - 100 MG/DL    VLDL, calculated 22 MG/DL    CHOL/HDL Ratio 2.0 0.0 - 5.0     METABOLIC PANEL, COMPREHENSIVE   Result Value Ref Range    Sodium 140 136 - 145 mmol/L    Potassium 4.8 3.5 - 5.1 mmol/L    Chloride 109 (H) 97 - 108 mmol/L    CO2 26 21 - 32 mmol/L    Anion gap 5 5 - 15 mmol/L    Glucose 123 (H) 65 - 100 mg/dL    BUN 33 (H) 6 - 20 MG/DL    Creatinine 2.08 (H) 0.70 - 1.30 MG/DL    BUN/Creatinine ratio 16 12 - 20      GFR est AA 37 (L) >60 ml/min/1.73m2    GFR est non-AA 30 (L) >60 ml/min/1.73m2    Calcium 10.1 8.5 - 10.1 MG/DL    Bilirubin, total 1.2 (H) 0.2 - 1.0 MG/DL    ALT (SGPT) 28 12 - 78 U/L    AST (SGOT) 24 15 - 37 U/L    Alk.  phosphatase 60 45 - 117 U/L    Protein, total 7.5 6.4 - 8.2 g/dL    Albumin 4.1 3.5 - 5.0 g/dL    Globulin 3.4 2.0 - 4.0 g/dL    A-G Ratio 1.2 1.1 - 2.2         Advice/Referrals/Counseling   Education and counseling provided:  Are appropriate based on today's review and evaluation  End-of-Life planning (with patient's consent)  Pneumococcal Vaccine  Influenza Vaccine  Colorectal cancer screening tests      Assessment/Plan     ASSESSMENT:   1. Essential hypertension    2. Controlled type 2 diabetes mellitus with stage 3 chronic kidney disease, without long-term current use of insulin [facility]    3. Mixed hyperlipidemia    4. Primary osteoarthritis involving multiple joints    5. Stage 3 chronic kidney disease, unspecified whether stage 3a or 3b CKD ([facility]    6. Severe obesity (BMI 35.0-39. 9) with comorbidity ([facility]    7. Prostate cancer ([facility]    8. Polymyalgia rheumatica ([facility]    9. Former smoker    8. Vitamin D deficiency    11. Alcohol screening    12. Medicare annual wellness visit, subsequent    13. Needs flu shot      Impression  1. Hypertension that is controlled so continue current therapy reviewed with him. 2.  Diabetes repeat status pending prior lab reviewed  3. Hyperlipidemia prior lab reviewed repeat status pending   4. DJD that is stable   5. CKD stage III repeat status pending  6. Obesity I did discuss diet, exercise weight reduction for overall health benefit. 7.  Prostate cancer repeat PSA pending no evidence recurrence  8   History of polymyalgia rheumatica now in remission after receiving treatment with prednisone  9. Former smoker no longer smokes  10. Vitamin D deficiency repeat status pending  11. Annual alcohol screening is done at this point he drinks maybe 1 time a year at [place] time. We did spend 8 minutes discussing excessive alcohol intake in males who averaged more than 2 drinks per day with increased cardiovascular risk and increased risk of liver disease and other GI problems. Flu shot given today. Medicare subsequent annual wellness examination screening questionnaire was completed today. The results were reviewed with him and his questions were answered. Lifestyle recommendations and modifications discussed and made. Follow-up again 3 months or sooner if there is a problem. I will call the lab results in the interim.     PLAN:  .  Orders Placed This Encounter Influenza, FLUAD, (age 72 y+), IM, PF, 0.5 mL    CBC WITH AUTOMATED DIFF    METABOLIC PANEL, COMPREHENSIVE    LIPID PANEL    CK    T4 (THYROXINE)    TSH 3RD GENERATION    URINALYSIS W/ REFLEX CULTURE    VITAMIN D, 25 HYDROXY    PSA SCREENING (SCREENING)    MICROALBUMIN, UR, RAND W/ MICROALB/CREAT RATIO         ATTENTION:   This medical record was transcribed using an electronic medical records system. Although proofread, it may and can contain electronic and spelling errors. Other human spelling and other errors may be present. Corrections may be executed at a later time. Please feel free to contact us for any clarifications as needed. Tammy Prakash MD     Recommended healthy diet low in carbohydrates, fats, sodium and cholesterol. Recommended regular cardiovascular exercise 3-6 times per week for 30-60 minutes daily. Current Outpatient Medications   Medication Sig Dispense Refill    lisinopriL (PRINIVIL, ZESTRIL) 40 mg tablet Take 1 Tablet by mouth daily. 90 Tablet 3    losartan (COZAAR) 100 mg tablet Take 1 tablet by mouth once daily 90 Tablet 3    glimepiride (AMARYL) 4 mg tablet TAKE 1 TABLET BY MOUTH IN THE MORNING 90 Tablet 3    ezetimibe (ZETIA) 10 mg tablet TAKE 1 TABLET BY MOUTH ONCE DAILY AT BEDTIME 90 Tablet 3    amLODIPine (NORVASC) 5 mg tablet Take 1 tablet by mouth once daily 90 Tablet 3    simvastatin (ZOCOR) 20 mg tablet TAKE 1 TABLET BY MOUTH ONCE DAILY AT NIGHT 90 Tablet 3    chlorthalidone (HYGROTON) 25 mg tablet Take 1 tablet by mouth once daily 90 Tablet 2    metFORMIN ER (GLUCOPHAGE XR) 500 mg tablet Take 2 tablets by mouth twice daily 360 Tablet 3    JANUVIA 100 mg tablet TAKE ONE TABLET BY MOUTH ONCE DAILY 90 Tab 3    aspirin delayed-release 81 mg tablet Take  by mouth daily. Omega-3-DHA-EPA-Fish Oil 1,000 mg (120 mg-180 mg) cap Take 1,500 mg by mouth daily. nicotinic acid (NIACIN) 500 mg tablet Take 500 mg by mouth Daily (before breakfast).          No results found for any visits on 12/01/22. Verbal and written instructions (see AVS) provided. Patient expresses understanding of diagnosis and treatment plan.     Ld Norton MD

## 2022-12-01 ENCOUNTER — OFFICE VISIT (OUTPATIENT)
Dept: INTERNAL MEDICINE CLINIC | Age: 87
End: 2022-12-01
Payer: MEDICARE

## 2022-12-01 VITALS
DIASTOLIC BLOOD PRESSURE: 82 MMHG | OXYGEN SATURATION: 95 % | HEIGHT: 67 IN | BODY MASS INDEX: 35 KG/M2 | TEMPERATURE: 97.6 F | SYSTOLIC BLOOD PRESSURE: 138 MMHG | HEART RATE: 70 BPM | WEIGHT: 223 LBS | RESPIRATION RATE: 16 BRPM

## 2022-12-01 DIAGNOSIS — Z13.39 ALCOHOL SCREENING: ICD-10-CM

## 2022-12-01 DIAGNOSIS — E78.2 MIXED HYPERLIPIDEMIA: ICD-10-CM

## 2022-12-01 DIAGNOSIS — Z87.891 FORMER SMOKER: ICD-10-CM

## 2022-12-01 DIAGNOSIS — M35.3 POLYMYALGIA RHEUMATICA (HCC): ICD-10-CM

## 2022-12-01 DIAGNOSIS — N18.30 STAGE 3 CHRONIC KIDNEY DISEASE, UNSPECIFIED WHETHER STAGE 3A OR 3B CKD (HCC): ICD-10-CM

## 2022-12-01 DIAGNOSIS — I10 ESSENTIAL HYPERTENSION: Primary | ICD-10-CM

## 2022-12-01 DIAGNOSIS — Z00.00 MEDICARE ANNUAL WELLNESS VISIT, SUBSEQUENT: ICD-10-CM

## 2022-12-01 DIAGNOSIS — M15.9 PRIMARY OSTEOARTHRITIS INVOLVING MULTIPLE JOINTS: ICD-10-CM

## 2022-12-01 DIAGNOSIS — N18.30 CONTROLLED TYPE 2 DIABETES MELLITUS WITH STAGE 3 CHRONIC KIDNEY DISEASE, WITHOUT LONG-TERM CURRENT USE OF INSULIN (HCC): ICD-10-CM

## 2022-12-01 DIAGNOSIS — E55.9 VITAMIN D DEFICIENCY: ICD-10-CM

## 2022-12-01 DIAGNOSIS — E11.22 CONTROLLED TYPE 2 DIABETES MELLITUS WITH STAGE 3 CHRONIC KIDNEY DISEASE, WITHOUT LONG-TERM CURRENT USE OF INSULIN (HCC): ICD-10-CM

## 2022-12-01 DIAGNOSIS — C61 PROSTATE CANCER (HCC): ICD-10-CM

## 2022-12-01 DIAGNOSIS — E66.01 SEVERE OBESITY (BMI 35.0-39.9) WITH COMORBIDITY (HCC): ICD-10-CM

## 2022-12-01 DIAGNOSIS — Z23 NEEDS FLU SHOT: ICD-10-CM

## 2022-12-01 PROCEDURE — G8536 NO DOC ELDER MAL SCRN: HCPCS | Performed by: INTERNAL MEDICINE

## 2022-12-01 PROCEDURE — G8427 DOCREV CUR MEDS BY ELIG CLIN: HCPCS | Performed by: INTERNAL MEDICINE

## 2022-12-01 PROCEDURE — G0442 ANNUAL ALCOHOL SCREEN 15 MIN: HCPCS | Performed by: INTERNAL MEDICINE

## 2022-12-01 PROCEDURE — G8417 CALC BMI ABV UP PARAM F/U: HCPCS | Performed by: INTERNAL MEDICINE

## 2022-12-01 PROCEDURE — G0008 ADMIN INFLUENZA VIRUS VAC: HCPCS | Performed by: INTERNAL MEDICINE

## 2022-12-01 PROCEDURE — 1123F ACP DISCUSS/DSCN MKR DOCD: CPT | Performed by: INTERNAL MEDICINE

## 2022-12-01 PROCEDURE — 3044F HG A1C LEVEL LT 7.0%: CPT | Performed by: INTERNAL MEDICINE

## 2022-12-01 PROCEDURE — G8510 SCR DEP NEG, NO PLAN REQD: HCPCS | Performed by: INTERNAL MEDICINE

## 2022-12-01 PROCEDURE — G0439 PPPS, SUBSEQ VISIT: HCPCS | Performed by: INTERNAL MEDICINE

## 2022-12-01 PROCEDURE — 1101F PT FALLS ASSESS-DOCD LE1/YR: CPT | Performed by: INTERNAL MEDICINE

## 2022-12-01 PROCEDURE — 99214 OFFICE O/P EST MOD 30 MIN: CPT | Performed by: INTERNAL MEDICINE

## 2022-12-01 PROCEDURE — 90694 VACC AIIV4 NO PRSRV 0.5ML IM: CPT | Performed by: INTERNAL MEDICINE

## 2022-12-01 NOTE — Clinical Note
NOTIFICATION RETURN TO WORK / SCHOOL    12/1/2022 10:26 AM    Mr. Antione Almeida [address]  [address] [id number]      To Whom It May Concern:    Antione Ibarra is currently under the care of [facility]. He will return to work/school on: ***    If there are questions or concerns please have the patient contact our office.         Sincerely,      Aurea Olson MD

## 2022-12-01 NOTE — PATIENT INSTRUCTIONS
Medicare Wellness Visit, Male    The best way to live healthy is to have a lifestyle where you eat a well-balanced diet, exercise regularly, limit alcohol use, and quit all forms of tobacco/nicotine, if applicable. Regular preventive services are another way to keep healthy. Preventive services (vaccines, screening tests, monitoring & exams) can help personalize your care plan, which helps you manage your own care. Screening tests can find health problems at the earliest stages, when they are easiest to treat. [facility] follows the current, evidence-based guidelines published by the [facility] ([facility]) when recommending preventive services for our patients. Because we follow these guidelines, sometimes recommendations change over time as research supports it. (For example, a prostate screening blood test is no longer routinely recommended for men with no symptoms). Of course, you and your doctor may decide to screen more often for some diseases, based on your risk and co-morbidities (chronic disease you are already diagnosed with). Preventive services for you include:  - Medicare offers their members a free annual wellness visit, which is time for you and your primary care provider to discuss and plan for your preventive service needs. Take advantage of this benefit every year!  -All adults over age 72 should receive the recommended pneumonia vaccines. Current USPSTF guidelines recommend a series of two vaccines for the best pneumonia protection.   -All adults should have a flu vaccine yearly and tetanus vaccine every 10 years.  -All adults age 48 and older should receive the shingles vaccines (series of two vaccines).        -All adults age 38-68 who are overweight should have a diabetes screening test once every three years.   -Other screening tests & preventive services for persons with diabetes include: an eye exam to screen for diabetic retinopathy, a kidney function test, a foot exam, and stricter control over your cholesterol.   -Cardiovascular screening for adults with routine risk involves an electrocardiogram (ECG) at intervals determined by the provider.   -Colorectal cancer screening should be done for adults age 54-65 with no increased risk factors for colorectal cancer. There are a number of acceptable methods of screening for this type of cancer. Each test has its own benefits and drawbacks. Discuss with your provider what is most appropriate for you during your annual wellness visit. The different tests include: colonoscopy (considered the best screening method), a fecal occult blood test, a fecal DNA test, and sigmoidoscopy.  -All adults born between [facility] should be screened once for Hepatitis C.  -An Abdominal Aortic Aneurysm (AAA) Screening is recommended for men age 73-68 who has ever smoked in their lifetime.      Here is a list of your current Health Maintenance items (your personalized list of preventive services) with a due date:  Health Maintenance Due   Topic Date Due    DTaP/Tdap/Td  (1 - Tdap) Never done    Shingles Vaccine (1 of 2) Never done    Eye Exam  02/09/2020    COVID-19 Vaccine (4 - Booster for Moderna series) 02/16/2022    Yearly Flu Vaccine (1) 08/01/2022    Albumin Urine Test  12/22/2022    Annual Well Visit  12/23/2022

## 2022-12-01 NOTE — PROGRESS NOTES
Chief Complaint   Patient presents with    Annual Wellness Visit     Visit Vitals  BP (!) 142/76 (BP 1 Location: Left upper arm, BP Patient Position: Sitting, BP Cuff Size: Adult)   Pulse 70   Temp 97.6 °F (36.4 °C) (Oral)   Resp 16   Ht 5' 7\" (1.702 m)   Wt 223 lb (101.2 kg)   SpO2 95%   BMI 34.93 kg/m²       Depression Risk Factor Screening:     3 most recent PHQ Screens 12/1/2022   Little interest or pleasure in doing things Not at all   Feeling down, depressed, irritable, or hopeless Not at all   Total Score PHQ 2 0       Functional Ability and Level of Safety:     Activities of Daily Living  ADL Assessment 12/1/2022   Feeding yourself No Help Needed   Getting from bed to chair No Help Needed   Getting dressed No Help Needed   Bathing or showering No Help Needed   Walk across the room (includes cane/walker) No Help Needed   Using the telphone No Help Needed   Taking your medications No Help Needed   Preparing meals No Help Needed   Managing money (expenses/bills) No Help Needed   Moderately strenuous housework (laundry) No Help Needed   Shopping for personal items (toiletries/medicines) No Help Needed   Shopping for groceries No Help Needed   Driving No Help Needed   Climbing a flight of stairs No Help Needed   Getting to places beyond walking distances No Help Needed       Fall Risk  Fall Risk Assessment, last 12 mths 12/1/2022   Able to walk? Yes   Fall in past 12 months? 0   Do you feel unsteady? 0   Are you worried about falling 0       Abuse Screen  Abuse Screening Questionnaire 12/1/2022   Do you ever feel afraid of your partner? N   Are you in a relationship with someone who physically or mentally threatens you? N   Is it safe for you to go home?  Y         Patient Care Team   Patient Care Team:  Antoine Duncan MD as PCP - General (Internal Medicine Physician)  Antoine Duncan MD as PCP - Romy Coffey Provider

## 2022-12-02 LAB
25(OH)D3 SERPL-MCNC: 31.7 NG/ML (ref 30–100)
ALBUMIN SERPL-MCNC: 4.4 G/DL (ref 3.5–5)
ALBUMIN/GLOB SERPL: 1.3 {RATIO} (ref 1.1–2.2)
ALP SERPL-CCNC: 56 U/L (ref 45–117)
ALT SERPL-CCNC: 28 U/L (ref 12–78)
ANION GAP SERPL CALC-SCNC: 5 MMOL/L (ref 5–15)
APPEARANCE UR: CLEAR
AST SERPL-CCNC: 26 U/L (ref 15–37)
BACTERIA URNS QL MICRO: NEGATIVE /HPF
BASOPHILS # BLD: 0.1 K/UL (ref 0–0.1)
BASOPHILS NFR BLD: 1 % (ref 0–1)
BILIRUB SERPL-MCNC: 1.5 MG/DL (ref 0.2–1)
BILIRUB UR QL: NEGATIVE
BUN SERPL-MCNC: 43 MG/DL (ref 6–20)
BUN/CREAT SERPL: 18 (ref 12–20)
CALCIUM SERPL-MCNC: 10.2 MG/DL (ref 8.5–10.1)
CHLORIDE SERPL-SCNC: 111 MMOL/L (ref 97–108)
CHOLEST SERPL-MCNC: 96 MG/DL
CK SERPL-CCNC: 157 U/L (ref 39–308)
CO2 SERPL-SCNC: 24 MMOL/L (ref 21–32)
COLOR UR: ABNORMAL
CREAT SERPL-MCNC: 2.41 MG/DL (ref 0.7–1.3)
CREAT UR-MCNC: 157 MG/DL
DIFFERENTIAL METHOD BLD: ABNORMAL
EOSINOPHIL # BLD: 0.1 K/UL (ref 0–0.4)
EOSINOPHIL NFR BLD: 1 % (ref 0–7)
EPITH CASTS URNS QL MICRO: ABNORMAL /LPF
ERYTHROCYTE [DISTWIDTH] IN BLOOD BY AUTOMATED COUNT: 13.5 % (ref 11.5–14.5)
GLOBULIN SER CALC-MCNC: 3.5 G/DL (ref 2–4)
GLUCOSE SERPL-MCNC: 112 MG/DL (ref 65–100)
GLUCOSE UR STRIP.AUTO-MCNC: NEGATIVE MG/DL
HCT VFR BLD AUTO: 41.2 % (ref 36.6–50.3)
HDLC SERPL-MCNC: 41 MG/DL
HDLC SERPL: 2.3 {RATIO} (ref 0–5)
HGB BLD-MCNC: 13 G/DL (ref 12.1–17)
HGB UR QL STRIP: NEGATIVE
HYALINE CASTS URNS QL MICRO: ABNORMAL /LPF (ref 0–5)
IMM GRANULOCYTES # BLD AUTO: 0 K/UL (ref 0–0.04)
IMM GRANULOCYTES NFR BLD AUTO: 0 % (ref 0–0.5)
KETONES UR QL STRIP.AUTO: NEGATIVE MG/DL
LDLC SERPL CALC-MCNC: 28.4 MG/DL (ref 0–100)
LEUKOCYTE ESTERASE UR QL STRIP.AUTO: NEGATIVE
LYMPHOCYTES # BLD: 2.6 K/UL (ref 0.8–3.5)
LYMPHOCYTES NFR BLD: 29 % (ref 12–49)
MCH RBC QN AUTO: 31.5 PG (ref 26–34)
MCHC RBC AUTO-ENTMCNC: 31.6 G/DL (ref 30–36.5)
MCV RBC AUTO: 99.8 FL (ref 80–99)
MICROALBUMIN UR-MCNC: 63.2 MG/DL
MICROALBUMIN/CREAT UR-RTO: 403 MG/G (ref 0–30)
MONOCYTES # BLD: 1.1 K/UL (ref 0–1)
MONOCYTES NFR BLD: 12 % (ref 5–13)
NEUTS SEG # BLD: 5.3 K/UL (ref 1.8–8)
NEUTS SEG NFR BLD: 57 % (ref 32–75)
NITRITE UR QL STRIP.AUTO: NEGATIVE
NRBC # BLD: 0 K/UL (ref 0–0.01)
NRBC BLD-RTO: 0 PER 100 WBC
PH UR STRIP: 5 [PH] (ref 5–8)
PLATELET # BLD AUTO: 251 K/UL (ref 150–400)
PMV BLD AUTO: 11.5 FL (ref 8.9–12.9)
POTASSIUM SERPL-SCNC: 5.4 MMOL/L (ref 3.5–5.1)
PROT SERPL-MCNC: 7.9 G/DL (ref 6.4–8.2)
PROT UR STRIP-MCNC: 100 MG/DL
PSA SERPL-MCNC: 0.2 NG/ML (ref 0.01–4)
RBC # BLD AUTO: 4.13 M/UL (ref 4.1–5.7)
RBC #/AREA URNS HPF: ABNORMAL /HPF (ref 0–5)
SODIUM SERPL-SCNC: 140 MMOL/L (ref 136–145)
SP GR UR REFRACTOMETRY: 1.02 (ref 1–1.03)
T4 SERPL-MCNC: 10.8 UG/DL (ref 4.5–12.1)
TRIGL SERPL-MCNC: 133 MG/DL (ref ?–150)
TSH SERPL DL<=0.05 MIU/L-ACNC: 1.71 UIU/ML (ref 0.36–3.74)
UA: UC IF INDICATED,UAUC: ABNORMAL
UROBILINOGEN UR QL STRIP.AUTO: 0.2 EU/DL (ref 0.2–1)
VLDLC SERPL CALC-MCNC: 26.6 MG/DL
WBC # BLD AUTO: 9.2 K/UL (ref 4.1–11.1)
WBC URNS QL MICRO: ABNORMAL /HPF (ref 0–4)

## 2022-12-06 NOTE — PROGRESS NOTES
Results have been reviewed and abnormal results noted. Please see documentation in new EMR. Please call the patient regarding his abnormal result. Kidney numbers are little worse. Losartan. Get him back in about a month so I can reevaluate his blood pressure and recheck his kidney numbers. Potassium is a little bit up which also should improve by stopping the losartan. Make sure to stay hydrated. Other labs are okay. Discussed with patient the need to d/c Losartan and scheduled for 1 month f/up.

## 2022-12-30 PROBLEM — Z00.00 MEDICARE ANNUAL WELLNESS VISIT, SUBSEQUENT: Status: RESOLVED | Noted: 2017-10-27 | Resolved: 2022-12-30

## 2023-01-05 PROBLEM — E87.5 HYPERKALEMIA: Status: ACTIVE | Noted: 2023-01-05

## 2023-01-05 NOTE — PROGRESS NOTES
Chief Complaint   Patient presents with    Chronic Kidney Disease     1 month follow up    Hypertension    Diabetes       SUBJECTIVE:    Fay Sparks is a 80 y.o. male who returns in follow-up for his medical problems include hypertension, hyperlipidemia, diabetes, CKD stage III, DJD and other medical problems. He was recently seen here which time his blood pressure was adequately controlled however when his labs returned he had an elevated potassium as well as his chronic kidney disease have become worse so we discontinued his losartan. We will continue his other medicines he returns in follow-up today and feels well. He currently denies any other problems. Current Outpatient Medications   Medication Sig Dispense Refill    lisinopriL (PRINIVIL, ZESTRIL) 40 mg tablet Take 1 Tablet by mouth daily. 90 Tablet 3    glimepiride (AMARYL) 4 mg tablet TAKE 1 TABLET BY MOUTH IN THE MORNING 90 Tablet 3    ezetimibe (ZETIA) 10 mg tablet TAKE 1 TABLET BY MOUTH ONCE DAILY AT BEDTIME 90 Tablet 3    amLODIPine (NORVASC) 5 mg tablet Take 1 tablet by mouth once daily 90 Tablet 3    simvastatin (ZOCOR) 20 mg tablet TAKE 1 TABLET BY MOUTH ONCE DAILY AT NIGHT 90 Tablet 3    chlorthalidone (HYGROTON) 25 mg tablet Take 1 tablet by mouth once daily 90 Tablet 2    metFORMIN ER (GLUCOPHAGE XR) 500 mg tablet Take 2 tablets by mouth twice daily 360 Tablet 3    JANUVIA 100 mg tablet TAKE ONE TABLET BY MOUTH ONCE DAILY 90 Tab 3    aspirin delayed-release 81 mg tablet Take  by mouth daily. Omega-3-DHA-EPA-Fish Oil 1,000 mg (120 mg-180 mg) cap Take 1,500 mg by mouth daily. nicotinic acid (NIACIN) 500 mg tablet Take 500 mg by mouth Daily (before breakfast).        Past Medical History:   Diagnosis Date    Cancer [facility])     prostate    Cardiac arrhythmia 7/20/2017    Colon cancer screening 7/20/2017    Diabetes ([facility]     Type II    Diabetes ([facility]     DJD (degenerative joint disease) 7/20/2017    Former smoker 7/20/2017 Hypercholesterolemia     Hyperlipidemia 2017    Hypertension     Hypertension with renal disease 2017    On statin therapy 2017    Polymyalgia rheumatica ([facility] 2017     Past Surgical History:   Procedure Laterality Date    HX PROSTATECTOMY          HX PROSTATECTOMY       No Known Allergies    REVIEW OF SYSTEMS:  General: negative for - chills or fever, or weight loss or gain  ENT: negative for - headaches, nasal congestion or tinnitus  Eyes: no blurred or visual changes  Neck: No stiffness or swollen nodes  Respiratory: negative for - cough, hemoptysis, shortness of breath or wheezing  Cardiovascular : negative for - chest pain, edema, palpitations or shortness of breath  Gastrointestinal: negative for - abdominal pain, blood in stools, heartburn or nausea/vomiting  Genito-Urinary: no dysuria, trouble voiding, or hematuria  Musculoskeletal: negative for - gait disturbance, joint pain, joint stiffness or joint swelling  Neurological: no TIA or stroke symptoms  Hematologic: no bruises, no bleeding  Lymphatic: no swollen glands  Integument: no lumps, mole changes, nail changes or rash  Endocrine:no malaise/lethargy poly uria or polydipsia or unexpected weight changes        Social History     Socioeconomic History    Marital status:    Tobacco Use    Smoking status: Former     Packs/day: 1.00     Years: 50.00     Pack years: 50.00     Types: Cigarettes     Start date: 1955     Quit date: 2008     Years since quittin.4    Smokeless tobacco: Never   Vaping Use    Vaping Use: Never used   Substance and Sexual Activity    Alcohol use: No    Drug use: No    Sexual activity: Never   Social History Narrative    ** Merged History Encounter **          Family History   Problem Relation Age of Onset    Heart Disease Mother     Cancer Father        OBJECTIVE:     Visit Vitals  BP (!) 142/70   Pulse 65   Temp 97.5 °F (36.4 °C) (Oral)   Ht 5' 7\" (1.702 m)   Wt 225 lb (102.1 kg)   SpO2 96%   BMI 35.24 kg/m²     CONSTITUTIONAL:   well nourished, appears age appropriate  EYES: sclera anicteric, PERRL, EOMI  ENMT:nares clear, moist mucous membranes, pharynx clear  NECK: supple. Thyroid normal, No JVD or bruits  RESPIRATORY: Chest: clear to ascultation and percussion, normal inspiratory effort  CARDIOVASCULAR: Heart: regular rate and rhythm no murmurs, rubs or gallops, PMI not displaced, No thrills, no peripheral edema  GASTROINTESTINAL: Abdomen: non distended, soft, non tender, bowel sounds normal  HEMATOLOGIC: no purpura, petechiae or bruising  LYMPHATIC: No lymph node enlargemant  MUSCULOSKELETAL: Extremities: no active synovitis, pulse 1+   INTEGUMENT: No unusual rashes or suspicious skin lesions noted. Nails appear normal.  PERIPHERAL VASCULAR: normal pulses femoral, PT and DP  NEUROLOGIC: non-focal exam, A & O X 3  PSYCHIATRIC:, appropriate affect     ASSESSMENT:   1. Essential hypertension    2. Stage 3 chronic kidney disease, unspecified whether stage 3a or 3b CKD ([facility]    3. Hyperkalemia      Impression  1. Hypertension blood pressure is adequate at 80years old so no changes to be made  2. CKD stage III repeat status pending   3. Hyperkalemia repeat status is pending  Follow-up with me again in 2 months as scheduled    PLAN:  .  Orders Placed This Encounter    METABOLIC PANEL, BASIC         ATTENTION:   This medical record was transcribed using an electronic medical records system. Although proofread, it may and can contain electronic and spelling errors. Other human spelling and other errors may be present. Corrections may be executed at a later time. Please feel free to contact us for any clarifications as needed. Follow-up and Dispositions    Return in about 2 months (around 3/6/2023). No results found for any visits on 01/06/23. Jeremiah Jensen MD    The patient verbalized understanding of the problems and plans as explained.

## 2023-01-06 ENCOUNTER — OFFICE VISIT (OUTPATIENT)
Dept: INTERNAL MEDICINE CLINIC | Age: 88
End: 2023-01-06
Payer: MEDICARE

## 2023-01-06 VITALS
HEART RATE: 65 BPM | TEMPERATURE: 97.5 F | WEIGHT: 225 LBS | HEIGHT: 67 IN | SYSTOLIC BLOOD PRESSURE: 142 MMHG | DIASTOLIC BLOOD PRESSURE: 70 MMHG | OXYGEN SATURATION: 96 % | BODY MASS INDEX: 35.31 KG/M2

## 2023-01-06 DIAGNOSIS — E87.5 HYPERKALEMIA: ICD-10-CM

## 2023-01-06 DIAGNOSIS — I10 ESSENTIAL HYPERTENSION: Primary | ICD-10-CM

## 2023-01-06 DIAGNOSIS — N18.30 STAGE 3 CHRONIC KIDNEY DISEASE, UNSPECIFIED WHETHER STAGE 3A OR 3B CKD (HCC): ICD-10-CM

## 2023-01-06 PROCEDURE — 1123F ACP DISCUSS/DSCN MKR DOCD: CPT | Performed by: INTERNAL MEDICINE

## 2023-01-06 PROCEDURE — G8536 NO DOC ELDER MAL SCRN: HCPCS | Performed by: INTERNAL MEDICINE

## 2023-01-06 PROCEDURE — 1101F PT FALLS ASSESS-DOCD LE1/YR: CPT | Performed by: INTERNAL MEDICINE

## 2023-01-06 PROCEDURE — G8417 CALC BMI ABV UP PARAM F/U: HCPCS | Performed by: INTERNAL MEDICINE

## 2023-01-06 PROCEDURE — G8427 DOCREV CUR MEDS BY ELIG CLIN: HCPCS | Performed by: INTERNAL MEDICINE

## 2023-01-06 PROCEDURE — G8510 SCR DEP NEG, NO PLAN REQD: HCPCS | Performed by: INTERNAL MEDICINE

## 2023-01-06 PROCEDURE — 99213 OFFICE O/P EST LOW 20 MIN: CPT | Performed by: INTERNAL MEDICINE

## 2023-01-06 NOTE — PROGRESS NOTES
Karl Valencia is a 80 y.o. male     Chief Complaint   Patient presents with    Chronic Kidney Disease     1 month follow up    Hypertension    Diabetes       Visit Vitals  BP (!) 162/80 (BP 1 Location: Left upper arm, BP Patient Position: Sitting, BP Cuff Size: Adult)   Pulse 65   Temp 97.5 °F (36.4 °C) (Oral)   Ht 5' 7\" (1.702 m)   Wt 225 lb (102.1 kg)   SpO2 96%   BMI 35.24 kg/m²       Health Maintenance Due   Topic Date Due    DTaP/Tdap/Td series (1 - Tdap) Never done    Shingles Vaccine (1 of 2) Never done    COVID-19 Vaccine (4 - Booster for Moderna series) 02/16/2022         1. \"Have you been to the ER, urgent care clinic since your last visit? Hospitalized since your last visit? \" No    2. \"Have you seen or consulted any other health care providers outside of the [address] [place] [address] since your last visit? \" No     3. For patients aged 39-70: Has the patient had a colonoscopy / FIT/ Cologuard? NA - based on age      If the patient is female:    4. For patients aged 41-77: Has the patient had a mammogram within the past 2 years? NA - based on age or sex      11. For patients aged 21-65: Has the patient had a pap smear?  NA - based on age or sex

## 2023-01-07 LAB
ANION GAP SERPL CALC-SCNC: 6 MMOL/L (ref 5–15)
BUN SERPL-MCNC: 32 MG/DL (ref 6–20)
BUN/CREAT SERPL: 16 (ref 12–20)
CALCIUM SERPL-MCNC: 10.4 MG/DL (ref 8.5–10.1)
CHLORIDE SERPL-SCNC: 108 MMOL/L (ref 97–108)
CO2 SERPL-SCNC: 25 MMOL/L (ref 21–32)
CREAT SERPL-MCNC: 2.04 MG/DL (ref 0.7–1.3)
GLUCOSE SERPL-MCNC: 127 MG/DL (ref 65–100)
POTASSIUM SERPL-SCNC: 4.7 MMOL/L (ref 3.5–5.1)
SODIUM SERPL-SCNC: 139 MMOL/L (ref 136–145)

## 2023-01-10 NOTE — PROGRESS NOTES
Results have been reviewed and abnormal results noted. Please see documentation in new EMR. Please call the patient regarding his abnormal result. [facility] [employer] numbers are better so continue same. Letter generated to patient regarding.

## 2023-03-05 NOTE — PROGRESS NOTES
Chief Complaint   Patient presents with    Hypertension     3 month f/up    Diabetes    Chronic Kidney Disease    Cholesterol Problem       SUBJECTIVE:    Freddie Mandel is a 80 y.o. male who returns in follow-up for his medical problems include hypertension, diabetes, hyperlipidemia, CKD, DJD, history of PMR, obesity and other multimedical problems. He is taking his medication trying to follow his diet and remains physically active. He is soon-to-be 80years old. He currently denies any chest pain, shortness of breath or cardiac respiratory complaints. There are no current GI or  complaints. There are no headaches, dizziness or neurologic complaints. There are no current active arthritic complaints and no other complaints on complete review of systems. Current Outpatient Medications   Medication Sig Dispense Refill    lisinopriL (PRINIVIL, ZESTRIL) 40 mg tablet Take 1 Tablet by mouth daily. 90 Tablet 3    glimepiride (AMARYL) 4 mg tablet TAKE 1 TABLET BY MOUTH IN THE MORNING 90 Tablet 3    ezetimibe (ZETIA) 10 mg tablet TAKE 1 TABLET BY MOUTH ONCE DAILY AT BEDTIME 90 Tablet 3    amLODIPine (NORVASC) 5 mg tablet Take 1 tablet by mouth once daily 90 Tablet 3    simvastatin (ZOCOR) 20 mg tablet TAKE 1 TABLET BY MOUTH ONCE DAILY AT NIGHT 90 Tablet 3    chlorthalidone (HYGROTON) 25 mg tablet Take 1 tablet by mouth once daily 90 Tablet 2    metFORMIN ER (GLUCOPHAGE XR) 500 mg tablet Take 2 tablets by mouth twice daily 360 Tablet 3    JANUVIA 100 mg tablet TAKE ONE TABLET BY MOUTH ONCE DAILY 90 Tab 3    aspirin delayed-release 81 mg tablet Take  by mouth daily. Omega-3-DHA-EPA-Fish Oil 1,000 mg (120 mg-180 mg) cap Take 1,500 mg by mouth daily. nicotinic acid (NIACIN) 500 mg tablet Take 500 mg by mouth Daily (before breakfast).        Past Medical History:   Diagnosis Date    Cancer [facility])     prostate    Cardiac arrhythmia 7/20/2017    Colon cancer screening 7/20/2017    Di[facility]     Type II    Diabetes ([facility]     DJD (degenerative joint disease) 2017    Former smoker 2017    Hypercholesterolemia     Hyperlipidemia 2017    Hypertension     Hypertension with renal disease 2017    On statin therapy 2017    Polymyalgia rheumatica ([facility] 2017     Past Surgical History:   Procedure Laterality Date    HX PROSTATECTOMY      1969    HX PROSTATECTOMY       No Known Allergies    REVIEW OF SYSTEMS:  General: negative for - chills or fever, or weight loss or gain  ENT: negative for - headaches, nasal congestion or tinnitus  Eyes: no blurred or visual changes  Neck: No stiffness or swollen nodes  Respiratory: negative for - cough, hemoptysis, shortness of breath or wheezing  Cardiovascular : negative for - chest pain, edema, palpitations or shortness of breath  Gastrointestinal: negative for - abdominal pain, blood in stools, heartburn or nausea/vomiting  Genito-Urinary: no dysuria, trouble voiding, or hematuria  Musculoskeletal: negative for - gait disturbance, joint pain, joint stiffness or joint swelling  Neurological: no TIA or stroke symptoms  Hematologic: no bruises, no bleeding  Lymphatic: no swollen glands  Integument: no lumps, mole changes, nail changes or rash  Endocrine:no malaise/lethargy poly uria or polydipsia or unexpected weight changes        Social History     Socioeconomic History    Marital status:    Tobacco Use    Smoking status: Former     Packs/day: 1.00     Years: 50.00     Pack years: 50.00     Types: Cigarettes     Start date: 1955     Quit date: 2008     Years since quittin.6    Smokeless tobacco: Never   Vaping Use    Vaping Use: Never used   Substance and Sexual Activity    Alcohol use: No    Drug use: No    Sexual activity: Never   Social History Narrative    ** Merged History Encounter **          Family History   Problem Relation Age of Onset    Heart Disease Mother     Cancer Father        OBJECTIVE:     Visit Vitals  /78 Pulse 62   Temp 97.7 °F (36.5 °C)   Ht 5' 7\" (1.702 m)   Wt 223 lb 1.6 oz (101.2 kg)   SpO2 96%   BMI 34.94 kg/m²     CONSTITUTIONAL:   well nourished, appears age appropriate  EYES: sclera anicteric, PERRL, EOMI  ENMT:nares clear, moist mucous membranes, pharynx clear  NECK: supple. Thyroid normal, No JVD or bruits  RESPIRATORY: Chest: clear to ascultation and percussion, normal inspiratory effort  CARDIOVASCULAR: Heart: regular rate and rhythm no murmurs, rubs or gallops, PMI not displaced, No thrills, no peripheral edema  GASTROINTESTINAL: Abdomen: non distended, soft, non tender, bowel sounds normal  HEMATOLOGIC: no purpura, petechiae or bruising  LYMPHATIC: No lymph node enlargemant  MUSCULOSKELETAL: Extremities: no active synovitis, pulse 1+. No diabetic foot changes noted  INTEGUMENT: No unusual rashes or suspicious skin lesions noted. Nails appear normal.  PERIPHERAL VASCULAR: normal pulses femoral, PT and DP  NEUROLOGIC: non-focal exam, A & O X 3. Normal distal sensation and proprioception all toes both feet  PSYCHIATRIC:, appropriate affect     ASSESSMENT:   1. Essential hypertension    2. Controlled type 2 diabetes mellitus with stage 3 chronic kidney disease, without long-term current use of insulin ([address]    3. Mixed hyperlipidemia    4. Primary osteoarthritis involving multiple joints    5. Stage 3 chronic kidney disease, unspecified whether stage 3a or 3b CKD ([address]    6. Severe obesity (BMI 35.0-39. 9) with comorbidity ([address]    7. Prostate cancer ([address]    8. Polymyalgia rheumatica (HCC)      Impression  1. Hypertension that is controlled so continue current therapy reviewed with him. 2.  Diabetes repeat status pending prior lab reviewed  3. Hyperlipidemia prior lab reviewed repeat status pending  4. DJD that is stable  5. CKD stage III repeat status pending  6.   Obesity I did discuss diet, exercise weight reduction for overall health benefit.  7 history of prostate cancer no evidence of recurrence status post prostatectomy  8 history of polymyalgia rheumatica no recurrence  I will call the lab and follow stable continue same and follow-up in 3 months or sooner if there is a problem. PLAN:  .  Orders Placed This Encounter    METABOLIC PANEL, COMPREHENSIVE    LIPID PANEL    CK    HEMOGLOBIN A1C WITH EAG         ATTENTION:   This medical record was transcribed using an electronic medical records system. Although proofread, it may and can contain electronic and spelling errors. Other human spelling and other errors may be present. Corrections may be executed at a later time. Please feel free to contact us for any clarifications as needed. Follow-up and Dispositions    Return in about 3 months (around 6/6/2023). No results found for any visits on 03/06/23. Willy Yan MD    The patient verbalized understanding of the problems and plans as explained.

## 2023-03-06 ENCOUNTER — OFFICE VISIT (OUTPATIENT)
Dept: INTERNAL MEDICINE CLINIC | Age: 88
End: 2023-03-06
Payer: MEDICARE

## 2023-03-06 VITALS
TEMPERATURE: 97.7 F | WEIGHT: 223.1 LBS | DIASTOLIC BLOOD PRESSURE: 78 MMHG | BODY MASS INDEX: 35.02 KG/M2 | OXYGEN SATURATION: 96 % | SYSTOLIC BLOOD PRESSURE: 138 MMHG | HEART RATE: 62 BPM | HEIGHT: 67 IN

## 2023-03-06 DIAGNOSIS — C61 PROSTATE CANCER (HCC): ICD-10-CM

## 2023-03-06 DIAGNOSIS — I10 ESSENTIAL HYPERTENSION: Primary | ICD-10-CM

## 2023-03-06 DIAGNOSIS — E66.01 SEVERE OBESITY (BMI 35.0-39.9) WITH COMORBIDITY (HCC): ICD-10-CM

## 2023-03-06 DIAGNOSIS — N18.30 STAGE 3 CHRONIC KIDNEY DISEASE, UNSPECIFIED WHETHER STAGE 3A OR 3B CKD (HCC): ICD-10-CM

## 2023-03-06 DIAGNOSIS — M15.9 PRIMARY OSTEOARTHRITIS INVOLVING MULTIPLE JOINTS: ICD-10-CM

## 2023-03-06 DIAGNOSIS — E11.22 CONTROLLED TYPE 2 DIABETES MELLITUS WITH STAGE 3 CHRONIC KIDNEY DISEASE, WITHOUT LONG-TERM CURRENT USE OF INSULIN (HCC): ICD-10-CM

## 2023-03-06 DIAGNOSIS — N18.30 CONTROLLED TYPE 2 DIABETES MELLITUS WITH STAGE 3 CHRONIC KIDNEY DISEASE, WITHOUT LONG-TERM CURRENT USE OF INSULIN (HCC): ICD-10-CM

## 2023-03-06 DIAGNOSIS — E78.2 MIXED HYPERLIPIDEMIA: ICD-10-CM

## 2023-03-06 DIAGNOSIS — M35.3 POLYMYALGIA RHEUMATICA (HCC): ICD-10-CM

## 2023-03-06 PROCEDURE — G8427 DOCREV CUR MEDS BY ELIG CLIN: HCPCS | Performed by: INTERNAL MEDICINE

## 2023-03-06 PROCEDURE — G8536 NO DOC ELDER MAL SCRN: HCPCS | Performed by: INTERNAL MEDICINE

## 2023-03-06 PROCEDURE — 1101F PT FALLS ASSESS-DOCD LE1/YR: CPT | Performed by: INTERNAL MEDICINE

## 2023-03-06 PROCEDURE — 99214 OFFICE O/P EST MOD 30 MIN: CPT | Performed by: INTERNAL MEDICINE

## 2023-03-06 PROCEDURE — 1123F ACP DISCUSS/DSCN MKR DOCD: CPT | Performed by: INTERNAL MEDICINE

## 2023-03-06 PROCEDURE — G8417 CALC BMI ABV UP PARAM F/U: HCPCS | Performed by: INTERNAL MEDICINE

## 2023-03-06 PROCEDURE — G8510 SCR DEP NEG, NO PLAN REQD: HCPCS | Performed by: INTERNAL MEDICINE

## 2023-03-06 NOTE — PROGRESS NOTES
Chief Complaint   Patient presents with    Hypertension     3 month f/up    Diabetes    Chronic Kidney Disease    Cholesterol Problem      1. Have you been to the ER, urgent care clinic since your last visit? Hospitalized since your last visit? No    2. Have you seen or consulted any other health care providers outside of the [address] [place] [address] since your last visit? Include any pap smears or colon screening.  No

## 2023-03-07 LAB
ALBUMIN SERPL-MCNC: 4.2 G/DL (ref 3.5–5)
ALBUMIN/GLOB SERPL: 1.2 (ref 1.1–2.2)
ALP SERPL-CCNC: 54 U/L (ref 45–117)
ALT SERPL-CCNC: 35 U/L (ref 12–78)
ANION GAP SERPL CALC-SCNC: 7 MMOL/L (ref 5–15)
AST SERPL-CCNC: 33 U/L (ref 15–37)
BILIRUB SERPL-MCNC: 1 MG/DL (ref 0.2–1)
BUN SERPL-MCNC: 40 MG/DL (ref 6–20)
BUN/CREAT SERPL: 18 (ref 12–20)
CALCIUM SERPL-MCNC: 10.7 MG/DL (ref 8.5–10.1)
CHLORIDE SERPL-SCNC: 109 MMOL/L (ref 97–108)
CHOLEST SERPL-MCNC: 91 MG/DL
CK SERPL-CCNC: 102 U/L (ref 39–308)
CO2 SERPL-SCNC: 25 MMOL/L (ref 21–32)
CREAT SERPL-MCNC: 2.26 MG/DL (ref 0.7–1.3)
EST. AVERAGE GLUCOSE BLD GHB EST-MCNC: 143 MG/DL
GLOBULIN SER CALC-MCNC: 3.4 G/DL (ref 2–4)
GLUCOSE SERPL-MCNC: 79 MG/DL (ref 65–100)
HBA1C MFR BLD: 6.6 % (ref 4–5.6)
HDLC SERPL-MCNC: 38 MG/DL
HDLC SERPL: 2.4 (ref 0–5)
LDLC SERPL CALC-MCNC: 30.2 MG/DL (ref 0–100)
POTASSIUM SERPL-SCNC: 4.9 MMOL/L (ref 3.5–5.1)
PROT SERPL-MCNC: 7.6 G/DL (ref 6.4–8.2)
SODIUM SERPL-SCNC: 141 MMOL/L (ref 136–145)
TRIGL SERPL-MCNC: 114 MG/DL (ref ?–150)
VLDLC SERPL CALC-MCNC: 22.8 MG/DL

## 2023-05-15 RX ORDER — METFORMIN HYDROCHLORIDE 500 MG/1
TABLET, EXTENDED RELEASE ORAL
Qty: 360 TABLET | Refills: 1 | Status: SHIPPED | OUTPATIENT
Start: 2023-05-15

## 2023-05-15 RX ORDER — CHLORTHALIDONE 25 MG/1
TABLET ORAL
Qty: 90 TABLET | Refills: 1 | Status: SHIPPED | OUTPATIENT
Start: 2023-05-15

## 2023-05-15 NOTE — TELEPHONE ENCOUNTER
RX refill request from the patient/pharmacy. Patient last seen 03- with labs, and next appt. scheduled for 06-  Requested Prescriptions     Pending Prescriptions Disp Refills    chlorthalidone (HYGROTON) 25 MG tablet [Pharmacy Med Name: Chlorthalidone 25 MG Oral Tablet] 90 tablet 1     Sig: Take 1 tablet by mouth once daily    metFORMIN (GLUCOPHAGE-XR) 500 MG extended release tablet [Pharmacy Med Name: metFORMIN HCl  MG Oral Tablet Extended Release 24 Hour] 360 tablet 1     Sig: Take 2 tablets by mouth twice daily    .

## 2023-06-13 PROBLEM — E66.811 CLASS 1 OBESITY DUE TO EXCESS CALORIES WITHOUT SERIOUS COMORBIDITY WITH BODY MASS INDEX (BMI) OF 34.0 TO 34.9 IN ADULT: Status: ACTIVE | Noted: 2018-05-16

## 2023-06-13 PROBLEM — M15.9 PRIMARY OSTEOARTHRITIS INVOLVING MULTIPLE JOINTS: Status: ACTIVE | Noted: 2017-07-20

## 2023-06-13 PROBLEM — E55.9 VITAMIN D DEFICIENCY: Status: ACTIVE | Noted: 2021-12-21

## 2023-06-13 PROBLEM — E66.09 CLASS 1 OBESITY DUE TO EXCESS CALORIES WITHOUT SERIOUS COMORBIDITY WITH BODY MASS INDEX (BMI) OF 34.0 TO 34.9 IN ADULT: Status: ACTIVE | Noted: 2018-05-16

## 2023-06-13 PROBLEM — N18.30 CONTROLLED TYPE 2 DIABETES MELLITUS WITH STAGE 3 CHRONIC KIDNEY DISEASE, WITHOUT LONG-TERM CURRENT USE OF INSULIN (HCC): Status: ACTIVE | Noted: 2017-07-20

## 2023-06-13 PROBLEM — E78.2 MIXED HYPERLIPIDEMIA: Status: ACTIVE | Noted: 2017-07-20

## 2023-06-13 PROBLEM — C61 PROSTATE CANCER (HCC): Status: ACTIVE | Noted: 2017-07-20

## 2023-06-13 PROBLEM — Z87.891 FORMER SMOKER: Status: ACTIVE | Noted: 2017-07-20

## 2023-06-13 PROBLEM — E11.22 CONTROLLED TYPE 2 DIABETES MELLITUS WITH STAGE 3 CHRONIC KIDNEY DISEASE, WITHOUT LONG-TERM CURRENT USE OF INSULIN (HCC): Status: ACTIVE | Noted: 2017-07-20

## 2023-06-13 PROBLEM — I10 ESSENTIAL HYPERTENSION: Status: ACTIVE | Noted: 2021-03-31

## 2023-06-13 PROBLEM — N18.30 CKD (CHRONIC KIDNEY DISEASE) STAGE 3, GFR 30-59 ML/MIN (HCC): Status: ACTIVE | Noted: 2020-12-23

## 2023-06-13 PROBLEM — M15.0 PRIMARY OSTEOARTHRITIS INVOLVING MULTIPLE JOINTS: Status: ACTIVE | Noted: 2017-07-20

## 2023-09-13 NOTE — PROGRESS NOTES
Chief Complaint   Patient presents with    Hypertension       SUBJECTIVE:    Lennox Safe is a 80 y.o. male who returns in follow-up for his medical problems including hypertension, diabetes, hyperlipidemia, DJD, obesity and other multimedical problems. He is taking his medication trying to follow his diet remains physically active. He currently denies any chest pain, shortness of breath or cardiac respiratory complaints. There are no current GI or  complaints. He has no headaches, dizziness or neurologic complaints. He has no current active arthritic complaints and there are no other complaints on complete review of systems. Current Outpatient Medications   Medication Sig Dispense Refill    Omega-3 Fatty Acids (FISH OIL PO) Take by mouth 1500 mg      chlorthalidone (HYGROTON) 25 MG tablet Take 1 tablet by mouth once daily 90 tablet 1    metFORMIN (GLUCOPHAGE-XR) 500 MG extended release tablet Take 2 tablets by mouth twice daily 360 tablet 1    amLODIPine (NORVASC) 5 MG tablet Take 1 tablet by mouth daily      aspirin 81 MG EC tablet Take by mouth daily      ezetimibe (ZETIA) 10 MG tablet Take 1 tablet by mouth nightly      glimepiride (AMARYL) 4 MG tablet Take 1 tablet by mouth every morning      lisinopril (PRINIVIL;ZESTRIL) 40 MG tablet Take 1 tablet by mouth daily      niacin 500 MG tablet Take 1 tablet by mouth every morning (before breakfast)      simvastatin (ZOCOR) 20 MG tablet TAKE 1 TABLET BY MOUTH ONCE DAILY AT NIGHT      SITagliptin (JANUVIA) 100 MG tablet Take 1 tablet by mouth daily       No current facility-administered medications for this visit.      Past Medical History:   Diagnosis Date    Cancer [facility])     prostate    Cardiac arrhythmia 7/20/2017    Colon cancer screening 7/20/2017    Diabetes ([facility])     Diabetes ([facility])     Type II    DJD (degenerative joint disease) 7/20/2017    Former smoker 7/20/2017    Hypercholesterolemia     Hyperlipidemia 7/20/2017    Hypertension

## 2023-09-14 ENCOUNTER — OFFICE VISIT (OUTPATIENT)
Facility: CLINIC | Age: 88
End: 2023-09-14
Payer: MEDICARE

## 2023-09-14 VITALS
HEART RATE: 58 BPM | SYSTOLIC BLOOD PRESSURE: 138 MMHG | HEIGHT: 67 IN | OXYGEN SATURATION: 94 % | TEMPERATURE: 97.8 F | WEIGHT: 220.1 LBS | DIASTOLIC BLOOD PRESSURE: 84 MMHG | RESPIRATION RATE: 18 BRPM | BODY MASS INDEX: 34.55 KG/M2

## 2023-09-14 DIAGNOSIS — E66.09 CLASS 1 OBESITY DUE TO EXCESS CALORIES WITHOUT SERIOUS COMORBIDITY WITH BODY MASS INDEX (BMI) OF 34.0 TO 34.9 IN ADULT: ICD-10-CM

## 2023-09-14 DIAGNOSIS — N18.30 STAGE 3 CHRONIC KIDNEY DISEASE, UNSPECIFIED WHETHER STAGE 3A OR 3B CKD (HCC): ICD-10-CM

## 2023-09-14 DIAGNOSIS — N18.30 CONTROLLED TYPE 2 DIABETES MELLITUS WITH STAGE 3 CHRONIC KIDNEY DISEASE, WITHOUT LONG-TERM CURRENT USE OF INSULIN (HCC): ICD-10-CM

## 2023-09-14 DIAGNOSIS — I10 ESSENTIAL HYPERTENSION: Primary | ICD-10-CM

## 2023-09-14 DIAGNOSIS — E11.22 CONTROLLED TYPE 2 DIABETES MELLITUS WITH STAGE 3 CHRONIC KIDNEY DISEASE, WITHOUT LONG-TERM CURRENT USE OF INSULIN (HCC): ICD-10-CM

## 2023-09-14 DIAGNOSIS — E78.2 MIXED HYPERLIPIDEMIA: ICD-10-CM

## 2023-09-14 DIAGNOSIS — M15.9 PRIMARY OSTEOARTHRITIS INVOLVING MULTIPLE JOINTS: ICD-10-CM

## 2023-09-14 LAB
ALBUMIN SERPL-MCNC: 4 G/DL (ref 3.5–5)
ALBUMIN/GLOB SERPL: 1.2 (ref 1.1–2.2)
ALP SERPL-CCNC: 58 U/L (ref 45–117)
ALT SERPL-CCNC: 24 U/L (ref 12–78)
ANION GAP SERPL CALC-SCNC: 7 MMOL/L (ref 5–15)
AST SERPL-CCNC: 21 U/L (ref 15–37)
BILIRUB SERPL-MCNC: 1.2 MG/DL (ref 0.2–1)
BUN SERPL-MCNC: 38 MG/DL (ref 6–20)
BUN/CREAT SERPL: 17 (ref 12–20)
CALCIUM SERPL-MCNC: 10.5 MG/DL (ref 8.5–10.1)
CHLORIDE SERPL-SCNC: 109 MMOL/L (ref 97–108)
CHOLEST SERPL-MCNC: 86 MG/DL
CK SERPL-CCNC: 114 U/L (ref 39–308)
CO2 SERPL-SCNC: 24 MMOL/L (ref 21–32)
CREAT SERPL-MCNC: 2.28 MG/DL (ref 0.7–1.3)
EST. AVERAGE GLUCOSE BLD GHB EST-MCNC: 140 MG/DL
GLOBULIN SER CALC-MCNC: 3.4 G/DL (ref 2–4)
GLUCOSE SERPL-MCNC: 76 MG/DL (ref 65–100)
HBA1C MFR BLD: 6.5 % (ref 4–5.6)
HDLC SERPL-MCNC: 39 MG/DL
HDLC SERPL: 2.2 (ref 0–5)
LDLC SERPL CALC-MCNC: 27.6 MG/DL (ref 0–100)
POTASSIUM SERPL-SCNC: 4.8 MMOL/L (ref 3.5–5.1)
PROT SERPL-MCNC: 7.4 G/DL (ref 6.4–8.2)
SODIUM SERPL-SCNC: 140 MMOL/L (ref 136–145)
TRIGL SERPL-MCNC: 97 MG/DL
VLDLC SERPL CALC-MCNC: 19.4 MG/DL

## 2023-09-14 PROCEDURE — 90694 VACC AIIV4 NO PRSRV 0.5ML IM: CPT | Performed by: INTERNAL MEDICINE

## 2023-09-14 PROCEDURE — 3044F HG A1C LEVEL LT 7.0%: CPT | Performed by: INTERNAL MEDICINE

## 2023-09-14 PROCEDURE — G0008 ADMIN INFLUENZA VIRUS VAC: HCPCS | Performed by: INTERNAL MEDICINE

## 2023-09-14 PROCEDURE — 99214 OFFICE O/P EST MOD 30 MIN: CPT | Performed by: INTERNAL MEDICINE

## 2023-09-14 PROCEDURE — 1123F ACP DISCUSS/DSCN MKR DOCD: CPT | Performed by: INTERNAL MEDICINE

## 2023-09-14 PROCEDURE — G8417 CALC BMI ABV UP PARAM F/U: HCPCS | Performed by: INTERNAL MEDICINE

## 2023-09-14 PROCEDURE — 1036F TOBACCO NON-USER: CPT | Performed by: INTERNAL MEDICINE

## 2023-09-14 PROCEDURE — G8427 DOCREV CUR MEDS BY ELIG CLIN: HCPCS | Performed by: INTERNAL MEDICINE

## 2023-11-14 RX ORDER — GLIMEPIRIDE 4 MG/1
4 TABLET ORAL EVERY MORNING
Qty: 90 TABLET | Refills: 3 | Status: SHIPPED | OUTPATIENT
Start: 2023-11-14

## 2023-11-14 RX ORDER — LISINOPRIL 40 MG/1
40 TABLET ORAL DAILY
Qty: 90 TABLET | Refills: 3 | Status: SHIPPED | OUTPATIENT
Start: 2023-11-14

## 2023-11-14 RX ORDER — SIMVASTATIN 20 MG
TABLET ORAL
Qty: 90 TABLET | Refills: 3 | Status: SHIPPED | OUTPATIENT
Start: 2023-11-14

## 2023-11-14 RX ORDER — CHLORTHALIDONE 25 MG/1
TABLET ORAL
Qty: 90 TABLET | Refills: 3 | Status: SHIPPED | OUTPATIENT
Start: 2023-11-14

## 2023-11-14 RX ORDER — AMLODIPINE BESYLATE 5 MG/1
5 TABLET ORAL DAILY
Qty: 90 TABLET | Refills: 3 | Status: SHIPPED | OUTPATIENT
Start: 2023-11-14

## 2023-11-14 RX ORDER — EZETIMIBE 10 MG/1
10 TABLET ORAL
Qty: 90 TABLET | Refills: 3 | Status: SHIPPED | OUTPATIENT
Start: 2023-11-14

## 2023-11-14 NOTE — TELEPHONE ENCOUNTER
PCP: Paz Magana MD    Last appt: 9/14/2023    Future Appointments   Date Time Provider [address]  [facility]   12/12/2023  9:10 AM Paz Magana MD [facility] AMB       Requested Prescriptions     Pending Prescriptions Disp Refills    chlorthalidone (HYGROTON) 25 MG tablet [Pharmacy Med Name: Chlorthalidone 25 MG Oral Tablet] 90 tablet 3     Sig: Take 1 tablet by mouth once daily    lisinopril (PRINIVIL;ZESTRIL) 40 MG tablet [Pharmacy Med Name: Lisinopril 40 MG Oral Tablet] 90 tablet 3     Sig: Take 1 tablet by mouth once daily    glimepiride (AMARYL) 4 MG tablet [Pharmacy Med Name: Glimepiride 4 MG Oral Tablet] 90 tablet 3     Sig: TAKE 1 TABLET BY MOUTH IN THE MORNING    ezetimibe (ZETIA) 10 MG tablet [Pharmacy Med Name: Ezetimibe 10 MG Oral Tablet] 90 tablet 3     Sig: TAKE 1 TABLET BY MOUTH ONCE DAILY AT BEDTIME    amLODIPine (NORVASC) 5 MG tablet [Pharmacy Med Name: amLODIPine Besylate 5 MG Oral Tablet] 90 tablet 3     Sig: Take 1 tablet by mouth once daily    simvastatin (ZOCOR) 20 MG tablet [Pharmacy Med Name: Simvastatin 20 MG Oral Tablet] 90 tablet 3     Sig: TAKE 1 TABLET BY MOUTH ONCE DAILY AT NIGHT

## 2023-11-15 RX ORDER — METFORMIN HYDROCHLORIDE 500 MG/1
TABLET, EXTENDED RELEASE ORAL
Qty: 360 TABLET | Refills: 3 | Status: SHIPPED | OUTPATIENT
Start: 2023-11-15

## 2023-11-15 NOTE — TELEPHONE ENCOUNTER
PCP: Rozina Ramirez MD    Last appt: 9/14/2023    Future Appointments   Date Time Provider [address]   12/12/2023  9:10 AM Rozina Ramirez MD [facility] AMB       Requested Prescriptions     Pending Prescriptions Disp Refills    metFORMIN (GLUCOPHAGE-XR) 500 MG extended release tablet [Pharmacy Med Name: metFORMIN HCl  MG Oral Tablet Extended Release 24 Hour] 360 tablet 3     Sig: Take 2 tablets by mouth twice daily

## 2023-12-12 ENCOUNTER — OFFICE VISIT (OUTPATIENT)
Facility: CLINIC | Age: 88
End: 2023-12-12
Payer: MEDICARE

## 2023-12-12 VITALS
RESPIRATION RATE: 16 BRPM | OXYGEN SATURATION: 96 % | TEMPERATURE: 97.6 F | DIASTOLIC BLOOD PRESSURE: 72 MMHG | WEIGHT: 220.4 LBS | SYSTOLIC BLOOD PRESSURE: 138 MMHG | HEIGHT: 67 IN | HEART RATE: 74 BPM | BODY MASS INDEX: 34.59 KG/M2

## 2023-12-12 DIAGNOSIS — E55.9 VITAMIN D DEFICIENCY: ICD-10-CM

## 2023-12-12 DIAGNOSIS — N18.30 STAGE 3 CHRONIC KIDNEY DISEASE, UNSPECIFIED WHETHER STAGE 3A OR 3B CKD (HCC): ICD-10-CM

## 2023-12-12 DIAGNOSIS — Z87.891 FORMER SMOKER: ICD-10-CM

## 2023-12-12 DIAGNOSIS — C61 PROSTATE CANCER (HCC): ICD-10-CM

## 2023-12-12 DIAGNOSIS — M35.3 POLYMYALGIA RHEUMATICA (HCC): ICD-10-CM

## 2023-12-12 DIAGNOSIS — N18.30 CONTROLLED TYPE 2 DIABETES MELLITUS WITH STAGE 3 CHRONIC KIDNEY DISEASE, WITHOUT LONG-TERM CURRENT USE OF INSULIN (HCC): ICD-10-CM

## 2023-12-12 DIAGNOSIS — Z13.39 ALCOHOL SCREENING: ICD-10-CM

## 2023-12-12 DIAGNOSIS — E78.2 MIXED HYPERLIPIDEMIA: ICD-10-CM

## 2023-12-12 DIAGNOSIS — Z00.00 MEDICARE ANNUAL WELLNESS VISIT, SUBSEQUENT: ICD-10-CM

## 2023-12-12 DIAGNOSIS — I10 ESSENTIAL HYPERTENSION: Primary | ICD-10-CM

## 2023-12-12 DIAGNOSIS — E66.09 CLASS 1 OBESITY DUE TO EXCESS CALORIES WITHOUT SERIOUS COMORBIDITY WITH BODY MASS INDEX (BMI) OF 34.0 TO 34.9 IN ADULT: ICD-10-CM

## 2023-12-12 DIAGNOSIS — E11.22 CONTROLLED TYPE 2 DIABETES MELLITUS WITH STAGE 3 CHRONIC KIDNEY DISEASE, WITHOUT LONG-TERM CURRENT USE OF INSULIN (HCC): ICD-10-CM

## 2023-12-12 DIAGNOSIS — M15.9 PRIMARY OSTEOARTHRITIS INVOLVING MULTIPLE JOINTS: ICD-10-CM

## 2023-12-12 PROCEDURE — 3044F HG A1C LEVEL LT 7.0%: CPT | Performed by: INTERNAL MEDICINE

## 2023-12-12 PROCEDURE — G0444 DEPRESSION SCREEN ANNUAL: HCPCS | Performed by: INTERNAL MEDICINE

## 2023-12-12 PROCEDURE — 1123F ACP DISCUSS/DSCN MKR DOCD: CPT | Performed by: INTERNAL MEDICINE

## 2023-12-12 PROCEDURE — G8484 FLU IMMUNIZE NO ADMIN: HCPCS | Performed by: INTERNAL MEDICINE

## 2023-12-12 PROCEDURE — G0439 PPPS, SUBSEQ VISIT: HCPCS | Performed by: INTERNAL MEDICINE

## 2023-12-12 ASSESSMENT — PATIENT HEALTH QUESTIONNAIRE - PHQ9
SUM OF ALL RESPONSES TO PHQ QUESTIONS 1-9: 0
SUM OF ALL RESPONSES TO PHQ9 QUESTIONS 1 & 2: 0
SUM OF ALL RESPONSES TO PHQ QUESTIONS 1-9: 0
SUM OF ALL RESPONSES TO PHQ QUESTIONS 1-9: 0
1. LITTLE INTEREST OR PLEASURE IN DOING THINGS: 0
2. FEELING DOWN, DEPRESSED OR HOPELESS: 0
SUM OF ALL RESPONSES TO PHQ QUESTIONS 1-9: 0

## 2023-12-12 ASSESSMENT — LIFESTYLE VARIABLES
HOW MANY STANDARD DRINKS CONTAINING ALCOHOL DO YOU HAVE ON A TYPICAL DAY: PATIENT DOES NOT DRINK
HOW OFTEN DO YOU HAVE A DRINK CONTAINING ALCOHOL: NEVER

## 2023-12-12 NOTE — PROGRESS NOTES
Medicare Annual Wellness Visit    Pankaj Hernandez is here for Medicare AWV    Assessment & Plan   Essential hypertension  -     Urinalysis with Microscopic; Future  -     TSH; Future  -     T4, Free; Future  -     Comprehensive Metabolic Panel; Future  -     CBC with Auto Differential; Future  Controlled type 2 diabetes mellitus with stage 3 chronic kidney disease, without long-term current use of insulin (HCC)  -     Hemoglobin A1C; Future  Mixed hyperlipidemia  -     Lipid Panel; Future  -     CK; Future  Primary osteoarthritis involving multiple joints  Stage 3 chronic kidney disease, unspecified whether stage 3a or 3b CKD (HCC)  Class 1 obesity due to excess calories without serious comorbidity with body mass index (BMI) of 34.0 to 34.9 in adult  Former smoker  Prostate cancer ([address])  -     PSA Screening; Future  Polymyalgia rheumatica (HCC)  Vitamin D deficiency  -     Vitamin D 25 Hydroxy; Future  Alcohol screening  Medicare annual wellness visit, subsequent    ASSESSMENT:   1. Essential hypertension    2. Controlled type 2 diabetes mellitus with stage 3 chronic kidney disease, without long-term current use of insulin ([address])    3. Mixed hyperlipidemia    4. Primary osteoarthritis involving multiple joints    5. Stage 3 chronic kidney disease, unspecified whether stage 3a or 3b CKD (HCC)    6. Class 1 obesity due to excess calories without serious comorbidity with body mass index (BMI) of 34.0 to 34.9 in adult    7. Former smoker    8. Prostate cancer ([address])    9. Polymyalgia rheumatica ([address])    10. Vitamin D deficiency    11. Alcohol screening    12. Medicare annual wellness visit, subsequent      Impression  1. Hypertension control adequate continue current treatment. 2.  Diabetes repeat status pendingReviewed I will adjust treatment if needed. 3.  Hyperlipidemia prior lab reviewed repeat status is pending I will adjust if needed.   4 DJD stable  5 CKD stage III repeat status pending  6 obesity we did discuss

## 2023-12-12 NOTE — PATIENT INSTRUCTIONS
preventive dental visit is recommended every 6 months. Try to get at least 150 minutes of exercise per week or 10,000 steps per day on a pedometer . Order or download the FREE \"Exercise & Physical Activity: Your Everyday Guide\" from [employer]. Call 5-124.804.1300 or search [employer] online. You need 1482-4804 mg of calcium and 7834-0609 IU of vitamin D per day. It is possible to meet your calcium requirement with diet alone, but a vitamin D supplement is usually necessary to meet this goal.  When exposed to the sun, use a sunscreen that protects against both UVA and UVB radiation with an SPF of 30 or greater. Reapply every 2 to 3 hours or after sweating, drying off with a towel, or swimming. Always wear a seat belt when traveling in a car. Always wear a helmet when riding a bicycle or motorcycle.

## 2023-12-13 LAB
25(OH)D3 SERPL-MCNC: 35 NG/ML (ref 30–100)
ALBUMIN SERPL-MCNC: 4.2 G/DL (ref 3.5–5)
ALBUMIN/GLOB SERPL: 1.1 (ref 1.1–2.2)
ALP SERPL-CCNC: 62 U/L (ref 45–117)
ALT SERPL-CCNC: 21 U/L (ref 12–78)
ANION GAP SERPL CALC-SCNC: 7 MMOL/L (ref 5–15)
APPEARANCE UR: CLEAR
AST SERPL-CCNC: 18 U/L (ref 15–37)
BACTERIA URNS QL MICRO: NEGATIVE /HPF
BASOPHILS # BLD: 0.1 K/UL (ref 0–0.1)
BASOPHILS NFR BLD: 1 % (ref 0–1)
BILIRUB SERPL-MCNC: 1.1 MG/DL (ref 0.2–1)
BILIRUB UR QL: NEGATIVE
BUN SERPL-MCNC: 40 MG/DL (ref 6–20)
BUN/CREAT SERPL: 18 (ref 12–20)
CALCIUM SERPL-MCNC: 10.6 MG/DL (ref 8.5–10.1)
CHLORIDE SERPL-SCNC: 110 MMOL/L (ref 97–108)
CHOLEST SERPL-MCNC: 101 MG/DL
CK SERPL-CCNC: 107 U/L (ref 39–308)
CO2 SERPL-SCNC: 25 MMOL/L (ref 21–32)
COLOR UR: ABNORMAL
CREAT SERPL-MCNC: 2.19 MG/DL (ref 0.7–1.3)
DIFFERENTIAL METHOD BLD: ABNORMAL
EOSINOPHIL # BLD: 0.1 K/UL (ref 0–0.4)
EOSINOPHIL NFR BLD: 1 % (ref 0–7)
EPITH CASTS URNS QL MICRO: ABNORMAL /LPF
ERYTHROCYTE [DISTWIDTH] IN BLOOD BY AUTOMATED COUNT: 13.8 % (ref 11.5–14.5)
EST. AVERAGE GLUCOSE BLD GHB EST-MCNC: 140 MG/DL
GLOBULIN SER CALC-MCNC: 3.7 G/DL (ref 2–4)
GLUCOSE SERPL-MCNC: 106 MG/DL (ref 65–100)
GLUCOSE UR STRIP.AUTO-MCNC: NEGATIVE MG/DL
HBA1C MFR BLD: 6.5 % (ref 4–5.6)
HCT VFR BLD AUTO: 40.8 % (ref 36.6–50.3)
HDLC SERPL-MCNC: 44 MG/DL
HDLC SERPL: 2.3 (ref 0–5)
HGB BLD-MCNC: 12.9 G/DL (ref 12.1–17)
HGB UR QL STRIP: NEGATIVE
HYALINE CASTS URNS QL MICRO: ABNORMAL /LPF (ref 0–5)
IMM GRANULOCYTES # BLD AUTO: 0 K/UL (ref 0–0.04)
IMM GRANULOCYTES NFR BLD AUTO: 0 % (ref 0–0.5)
KETONES UR QL STRIP.AUTO: NEGATIVE MG/DL
LDLC SERPL CALC-MCNC: 35.8 MG/DL (ref 0–100)
LEUKOCYTE ESTERASE UR QL STRIP.AUTO: NEGATIVE
LYMPHOCYTES # BLD: 2.3 K/UL (ref 0.8–3.5)
LYMPHOCYTES NFR BLD: 22 % (ref 12–49)
MCH RBC QN AUTO: 30.7 PG (ref 26–34)
MCHC RBC AUTO-ENTMCNC: 31.6 G/DL (ref 30–36.5)
MCV RBC AUTO: 97.1 FL (ref 80–99)
MONOCYTES # BLD: 1.1 K/UL (ref 0–1)
MONOCYTES NFR BLD: 10 % (ref 5–13)
NEUTS SEG # BLD: 7 K/UL (ref 1.8–8)
NEUTS SEG NFR BLD: 66 % (ref 32–75)
NITRITE UR QL STRIP.AUTO: NEGATIVE
NRBC # BLD: 0 K/UL (ref 0–0.01)
NRBC BLD-RTO: 0 PER 100 WBC
PH UR STRIP: 5 (ref 5–8)
PLATELET # BLD AUTO: 267 K/UL (ref 150–400)
PMV BLD AUTO: 11.7 FL (ref 8.9–12.9)
POTASSIUM SERPL-SCNC: 5 MMOL/L (ref 3.5–5.1)
PROT SERPL-MCNC: 7.9 G/DL (ref 6.4–8.2)
PROT UR STRIP-MCNC: 100 MG/DL
PSA SERPL-MCNC: 0.3 NG/ML (ref 0.01–4)
RBC # BLD AUTO: 4.2 M/UL (ref 4.1–5.7)
RBC #/AREA URNS HPF: ABNORMAL /HPF (ref 0–5)
SODIUM SERPL-SCNC: 142 MMOL/L (ref 136–145)
SP GR UR REFRACTOMETRY: 1.02 (ref 1–1.03)
T4 FREE SERPL-MCNC: 1.2 NG/DL (ref 0.8–1.5)
TRIGL SERPL-MCNC: 106 MG/DL
TSH SERPL DL<=0.05 MIU/L-ACNC: 1.43 UIU/ML (ref 0.36–3.74)
UROBILINOGEN UR QL STRIP.AUTO: 0.2 EU/DL (ref 0.2–1)
VLDLC SERPL CALC-MCNC: 21.2 MG/DL
WBC # BLD AUTO: 10.6 K/UL (ref 4.1–11.1)
WBC URNS QL MICRO: ABNORMAL /HPF (ref 0–4)

## 2024-01-10 PROBLEM — Z00.00 MEDICARE ANNUAL WELLNESS VISIT, SUBSEQUENT: Status: RESOLVED | Noted: 2017-10-27 | Resolved: 2024-01-10

## 2024-03-11 NOTE — PROGRESS NOTES
Chief Complaint   Patient presents with    3 Month Follow-Up       SUBJECTIVE:    Yves Boyd is a 88 y.o. male who returns in follow-up for his medical problems include hypertension, diabetes, hyperlipidemia, DJD, obesity, CKD stage III, history of PMR and other medical problems.  He is taking his medications although currently not taking lisinopril and he seems to be doing well off of it.  He currently notes no chest pain, shortness of breath or cardiac respiratory complaints.  There are no GI or  complaints.  He has no headaches, dizziness or neurologic complaints.  He has no current active arthritic complaints and there are no other complaints on complete review of systems.      Current Outpatient Medications   Medication Sig Dispense Refill    metFORMIN (GLUCOPHAGE-XR) 500 MG extended release tablet Take 2 tablets by mouth twice daily 360 tablet 3    chlorthalidone (HYGROTON) 25 MG tablet Take 1 tablet by mouth once daily 90 tablet 3    glimepiride (AMARYL) 4 MG tablet TAKE 1 TABLET BY MOUTH IN THE MORNING 90 tablet 3    ezetimibe (ZETIA) 10 MG tablet TAKE 1 TABLET BY MOUTH ONCE DAILY AT BEDTIME 90 tablet 3    amLODIPine (NORVASC) 5 MG tablet Take 1 tablet by mouth once daily 90 tablet 3    simvastatin (ZOCOR) 20 MG tablet TAKE 1 TABLET BY MOUTH ONCE DAILY AT NIGHT 90 tablet 3    aspirin 81 MG EC tablet Take by mouth daily      niacin 500 MG tablet Take 1 tablet by mouth every morning (before breakfast)      SITagliptin (JANUVIA) 100 MG tablet Take 1 tablet by mouth daily      Omega-3 Fatty Acids (FISH OIL PO) Take by mouth 1500 mg       No current facility-administered medications for this visit.     Past Medical History:   Diagnosis Date    Cancer (HCC)     prostate    Cardiac arrhythmia 7/20/2017    Colon cancer screening 7/20/2017    Diabetes (HCC)     Diabetes (HCC)     Type II    DJD (degenerative joint disease) 7/20/2017    Former smoker 7/20/2017    Hypercholesterolemia     Hyperlipidemia

## 2024-03-12 ENCOUNTER — OFFICE VISIT (OUTPATIENT)
Facility: CLINIC | Age: 89
End: 2024-03-12
Payer: MEDICARE

## 2024-03-12 VITALS
TEMPERATURE: 97.5 F | HEIGHT: 67 IN | OXYGEN SATURATION: 95 % | SYSTOLIC BLOOD PRESSURE: 147 MMHG | HEART RATE: 68 BPM | BODY MASS INDEX: 34.37 KG/M2 | DIASTOLIC BLOOD PRESSURE: 82 MMHG | WEIGHT: 219 LBS

## 2024-03-12 DIAGNOSIS — M15.9 PRIMARY OSTEOARTHRITIS INVOLVING MULTIPLE JOINTS: ICD-10-CM

## 2024-03-12 DIAGNOSIS — E66.09 CLASS 1 OBESITY DUE TO EXCESS CALORIES WITHOUT SERIOUS COMORBIDITY WITH BODY MASS INDEX (BMI) OF 34.0 TO 34.9 IN ADULT: ICD-10-CM

## 2024-03-12 DIAGNOSIS — C61 PROSTATE CANCER (HCC): ICD-10-CM

## 2024-03-12 DIAGNOSIS — I10 ESSENTIAL HYPERTENSION: Primary | ICD-10-CM

## 2024-03-12 DIAGNOSIS — E78.2 MIXED HYPERLIPIDEMIA: ICD-10-CM

## 2024-03-12 DIAGNOSIS — E11.22 CONTROLLED TYPE 2 DIABETES MELLITUS WITH STAGE 3 CHRONIC KIDNEY DISEASE, WITHOUT LONG-TERM CURRENT USE OF INSULIN (HCC): ICD-10-CM

## 2024-03-12 DIAGNOSIS — N18.30 STAGE 3 CHRONIC KIDNEY DISEASE, UNSPECIFIED WHETHER STAGE 3A OR 3B CKD (HCC): ICD-10-CM

## 2024-03-12 DIAGNOSIS — M35.3 POLYMYALGIA RHEUMATICA (HCC): ICD-10-CM

## 2024-03-12 DIAGNOSIS — N18.30 CONTROLLED TYPE 2 DIABETES MELLITUS WITH STAGE 3 CHRONIC KIDNEY DISEASE, WITHOUT LONG-TERM CURRENT USE OF INSULIN (HCC): ICD-10-CM

## 2024-03-12 PROCEDURE — 99214 OFFICE O/P EST MOD 30 MIN: CPT | Performed by: INTERNAL MEDICINE

## 2024-03-12 PROCEDURE — G8484 FLU IMMUNIZE NO ADMIN: HCPCS | Performed by: INTERNAL MEDICINE

## 2024-03-12 PROCEDURE — G8427 DOCREV CUR MEDS BY ELIG CLIN: HCPCS | Performed by: INTERNAL MEDICINE

## 2024-03-12 PROCEDURE — 1036F TOBACCO NON-USER: CPT | Performed by: INTERNAL MEDICINE

## 2024-03-12 PROCEDURE — G8417 CALC BMI ABV UP PARAM F/U: HCPCS | Performed by: INTERNAL MEDICINE

## 2024-03-12 PROCEDURE — 1123F ACP DISCUSS/DSCN MKR DOCD: CPT | Performed by: INTERNAL MEDICINE

## 2024-03-12 ASSESSMENT — PATIENT HEALTH QUESTIONNAIRE - PHQ9
SUM OF ALL RESPONSES TO PHQ QUESTIONS 1-9: 0
2. FEELING DOWN, DEPRESSED OR HOPELESS: 0
SUM OF ALL RESPONSES TO PHQ9 QUESTIONS 1 & 2: 0
SUM OF ALL RESPONSES TO PHQ QUESTIONS 1-9: 0
1. LITTLE INTEREST OR PLEASURE IN DOING THINGS: 0
SUM OF ALL RESPONSES TO PHQ QUESTIONS 1-9: 0
SUM OF ALL RESPONSES TO PHQ QUESTIONS 1-9: 0

## 2024-03-12 NOTE — PROGRESS NOTES
Identified pt with two pt identifiers(name and ). Reviewed record in preparation for visit and have obtained necessary documentation.  Chief Complaint   Patient presents with    3 Month Follow-Up      Vitals:    24 [id number] 24 0915   BP: (!) 150/70 (!) 147/82   Pulse: 68    Temp: 97.5 °F (36.4 °C)    TempSrc: Oral    SpO2: 95%    Weight: 99.3 kg (219 lb)    Height: 1.702 m (5' 7\")       Pain Scale: /10  Health Maintenance Due   Topic    Pneumococcal 65+ years Vaccine (1 - PCV)    DTaP/Tdap/Td vaccine (1 - Tdap)    Shingles vaccine (1 of 2)    Respiratory Syncytial Virus (RSV) Pregnant or age 60 yrs+ (1 - 1-dose 60+ series)    COVID-19 Vaccine ( season)     Coordination of Care Questionnaire:  :   1. Have you been to the ER, urgent care clinic since your last visit?  Hospitalized since your last visit?no  2. Have you seen or consulted any other health care providers outside of the [facility] since your last visit?  Include any pap smears or colon screening. no

## 2024-03-13 LAB
ALBUMIN SERPL-MCNC: 4 G/DL (ref 3.5–5)
ALBUMIN/GLOB SERPL: 1.1 (ref 1.1–2.2)
ALP SERPL-CCNC: 57 U/L (ref 45–117)
ALT SERPL-CCNC: 31 U/L (ref 12–78)
ANION GAP SERPL CALC-SCNC: 12 MMOL/L (ref 5–15)
AST SERPL-CCNC: 30 U/L (ref 15–37)
BILIRUB SERPL-MCNC: 1.2 MG/DL (ref 0.2–1)
BUN SERPL-MCNC: 47 MG/DL (ref 6–20)
BUN/CREAT SERPL: 21 (ref 12–20)
CALCIUM SERPL-MCNC: 10.1 MG/DL (ref 8.5–10.1)
CHLORIDE SERPL-SCNC: 113 MMOL/L (ref 97–108)
CHOLEST SERPL-MCNC: 92 MG/DL
CK SERPL-CCNC: 160 U/L (ref 39–308)
CO2 SERPL-SCNC: 18 MMOL/L (ref 21–32)
CREAT SERPL-MCNC: 2.28 MG/DL (ref 0.7–1.3)
EST. AVERAGE GLUCOSE BLD GHB EST-MCNC: 137 MG/DL
GLOBULIN SER CALC-MCNC: 3.7 G/DL (ref 2–4)
GLUCOSE SERPL-MCNC: 65 MG/DL (ref 65–100)
HBA1C MFR BLD: 6.4 % (ref 4–5.6)
HDLC SERPL-MCNC: 39 MG/DL
HDLC SERPL: 2.4 (ref 0–5)
LDLC SERPL CALC-MCNC: 28.4 MG/DL (ref 0–100)
POTASSIUM SERPL-SCNC: 4.8 MMOL/L (ref 3.5–5.1)
PROT SERPL-MCNC: 7.7 G/DL (ref 6.4–8.2)
SODIUM SERPL-SCNC: 143 MMOL/L (ref 136–145)
TRIGL SERPL-MCNC: 123 MG/DL
VLDLC SERPL CALC-MCNC: 24.6 MG/DL

## 2024-06-18 ENCOUNTER — OFFICE VISIT (OUTPATIENT)
Facility: CLINIC | Age: 89
End: 2024-06-18
Payer: MEDICARE

## 2024-06-18 VITALS
TEMPERATURE: 98.1 F | SYSTOLIC BLOOD PRESSURE: 136 MMHG | WEIGHT: 216 LBS | RESPIRATION RATE: 17 BRPM | DIASTOLIC BLOOD PRESSURE: 71 MMHG | BODY MASS INDEX: 33.9 KG/M2 | OXYGEN SATURATION: 94 % | HEART RATE: 71 BPM | HEIGHT: 67 IN

## 2024-06-18 DIAGNOSIS — E11.22 CONTROLLED TYPE 2 DIABETES MELLITUS WITH STAGE 3 CHRONIC KIDNEY DISEASE, WITHOUT LONG-TERM CURRENT USE OF INSULIN (HCC): ICD-10-CM

## 2024-06-18 DIAGNOSIS — I10 ESSENTIAL HYPERTENSION: Primary | ICD-10-CM

## 2024-06-18 DIAGNOSIS — N18.30 STAGE 3 CHRONIC KIDNEY DISEASE, UNSPECIFIED WHETHER STAGE 3A OR 3B CKD (HCC): ICD-10-CM

## 2024-06-18 DIAGNOSIS — E66.09 CLASS 1 OBESITY DUE TO EXCESS CALORIES WITHOUT SERIOUS COMORBIDITY WITH BODY MASS INDEX (BMI) OF 34.0 TO 34.9 IN ADULT: ICD-10-CM

## 2024-06-18 DIAGNOSIS — N18.30 CONTROLLED TYPE 2 DIABETES MELLITUS WITH STAGE 3 CHRONIC KIDNEY DISEASE, WITHOUT LONG-TERM CURRENT USE OF INSULIN (HCC): ICD-10-CM

## 2024-06-18 DIAGNOSIS — M15.9 PRIMARY OSTEOARTHRITIS INVOLVING MULTIPLE JOINTS: ICD-10-CM

## 2024-06-18 DIAGNOSIS — E78.2 MIXED HYPERLIPIDEMIA: ICD-10-CM

## 2024-06-18 LAB
ALBUMIN SERPL-MCNC: 4.2 G/DL (ref 3.5–5)
ALBUMIN/GLOB SERPL: 1.2 (ref 1.1–2.2)
ALP SERPL-CCNC: 62 U/L (ref 45–117)
ALT SERPL-CCNC: 25 U/L (ref 12–78)
ANION GAP SERPL CALC-SCNC: 7 MMOL/L (ref 5–15)
AST SERPL-CCNC: 22 U/L (ref 15–37)
BILIRUB SERPL-MCNC: 1.1 MG/DL (ref 0.2–1)
BUN SERPL-MCNC: 46 MG/DL (ref 6–20)
BUN/CREAT SERPL: 19 (ref 12–20)
CALCIUM SERPL-MCNC: 10.6 MG/DL (ref 8.5–10.1)
CHLORIDE SERPL-SCNC: 111 MMOL/L (ref 97–108)
CHOLEST SERPL-MCNC: 106 MG/DL
CK SERPL-CCNC: 112 U/L (ref 39–308)
CO2 SERPL-SCNC: 24 MMOL/L (ref 21–32)
CREAT SERPL-MCNC: 2.39 MG/DL (ref 0.7–1.3)
EST. AVERAGE GLUCOSE BLD GHB EST-MCNC: 140 MG/DL
GLOBULIN SER CALC-MCNC: 3.6 G/DL (ref 2–4)
GLUCOSE SERPL-MCNC: 95 MG/DL (ref 65–100)
HBA1C MFR BLD: 6.5 % (ref 4–5.6)
HDLC SERPL-MCNC: 39 MG/DL
HDLC SERPL: 2.7 (ref 0–5)
LDLC SERPL CALC-MCNC: 44.8 MG/DL (ref 0–100)
POTASSIUM SERPL-SCNC: 5.2 MMOL/L (ref 3.5–5.1)
PROT SERPL-MCNC: 7.8 G/DL (ref 6.4–8.2)
SODIUM SERPL-SCNC: 142 MMOL/L (ref 136–145)
TRIGL SERPL-MCNC: 111 MG/DL
VLDLC SERPL CALC-MCNC: 22.2 MG/DL

## 2024-06-18 PROCEDURE — G8427 DOCREV CUR MEDS BY ELIG CLIN: HCPCS | Performed by: INTERNAL MEDICINE

## 2024-06-18 PROCEDURE — 3044F HG A1C LEVEL LT 7.0%: CPT | Performed by: INTERNAL MEDICINE

## 2024-06-18 PROCEDURE — 99214 OFFICE O/P EST MOD 30 MIN: CPT | Performed by: INTERNAL MEDICINE

## 2024-06-18 PROCEDURE — 1123F ACP DISCUSS/DSCN MKR DOCD: CPT | Performed by: INTERNAL MEDICINE

## 2024-06-18 PROCEDURE — 1036F TOBACCO NON-USER: CPT | Performed by: INTERNAL MEDICINE

## 2024-06-18 PROCEDURE — G8417 CALC BMI ABV UP PARAM F/U: HCPCS | Performed by: INTERNAL MEDICINE

## 2024-06-18 RX ORDER — LISINOPRIL 40 MG/1
40 TABLET ORAL DAILY
COMMUNITY

## 2024-06-18 SDOH — ECONOMIC STABILITY: FOOD INSECURITY: WITHIN THE PAST 12 MONTHS, THE FOOD YOU BOUGHT JUST DIDN'T LAST AND YOU DIDN'T HAVE MONEY TO GET MORE.: NEVER TRUE

## 2024-06-18 SDOH — ECONOMIC STABILITY: HOUSING INSECURITY
IN THE LAST 12 MONTHS, WAS THERE A TIME WHEN YOU DID NOT HAVE A STEADY PLACE TO SLEEP OR SLEPT IN A SHELTER (INCLUDING NOW)?: NO

## 2024-06-18 SDOH — ECONOMIC STABILITY: FOOD INSECURITY: WITHIN THE PAST 12 MONTHS, YOU WORRIED THAT YOUR FOOD WOULD RUN OUT BEFORE YOU GOT MONEY TO BUY MORE.: NEVER TRUE

## 2024-06-18 SDOH — ECONOMIC STABILITY: INCOME INSECURITY: HOW HARD IS IT FOR YOU TO PAY FOR THE VERY BASICS LIKE FOOD, HOUSING, MEDICAL CARE, AND HEATING?: NOT HARD AT ALL

## 2024-06-18 NOTE — PROGRESS NOTES
Yves Boyd is a 89 y.o. male     Chief Complaint   Patient presents with    3 Month Follow-Up       /71 (Site: Right Upper Arm, Position: Sitting, Cuff Size: Medium Adult)   Pulse 71   Temp 98.1 °F (36.7 °C) (Temporal)   Resp 17   Ht 1.702 m (5' 7\")   Wt 98 kg (216 lb)   SpO2 94%   BMI 33.83 kg/m²     Health Maintenance Due   Topic Date Due    Pneumococcal 65+ years Vaccine (1 of 2 - PCV) 04/02/1941    DTaP/Tdap/Td vaccine (1 - Tdap) Never done    Shingles vaccine (1 of 2) Never done    Respiratory Syncytial Virus (RSV) Pregnant or age 60 yrs+ (1 - 1-dose 60+ series) Never done    COVID-19 Vaccine (4 - 2023-24 season) 09/01/2023         \"Have you been to the ER, urgent care clinic since your last visit?  Hospitalized since your last visit?\"    NO    “Have you seen or consulted any other health care providers outside of [facility] since your last visit?”    NO                     
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    On statin therapy 7/20/2017    Polymyalgia rheumatica (HCC) 7/20/2017     Past Surgical History:   Procedure Laterality Date    PROSTATECTOMY      PROSTATECTOMY      1969     No Known Allergies    REVIEW OF SYSTEMS:  General: negative for - chills or fever, or weight loss or gain  ENT: negative for - headaches, nasal congestion or tinnitus  Eyes: no blurred or visual changes  Neck: No stiffness or swollen nodes  Respiratory: negative for - cough, hemoptysis, shortness of breath or wheezing  Cardiovascular : negative for - chest pain, edema, palpitations or shortness of breath  Gastrointestinal: negative for - abdominal pain, blood in stools, heartburn or nausea/vomiting  Genito-Urinary: no dysuria, trouble voiding, or hematuria  Musculoskeletal: negative for - gait disturbance, joint pain, joint stiffness or joint swelling  Neurological: no TIA or stroke symptoms  Hematologic: no bruises, no bleeding  Lymphatic: no swollen glands  Integument: no lumps, mole changes, nail changes or rash  Endocrine:no malaise/lethargy poly uria or polydipsia or unexpected weight changes        Social History     Socioeconomic History    Marital status:      Spouse name: None    Number of children: None    Years of education: None    Highest education level: None   Tobacco Use    Smoking status: Former     Current packs/day: 0.00     Average packs/day: 1 pack/day for 53.0 years (53.0 ttl pk-yrs)     Types: Cigarettes     Start date: 7/20/1955     Quit date: 7/20/2008     Years since quitting: 15.9    Smokeless tobacco: Never   Substance and Sexual Activity    Alcohol use: No    Drug use: No    Sexual activity: Yes     Partners: Female   Social History Narrative         ** Merged History Encounter **     Social Determinants of Health     Financial Resource Strain: Low Risk  (6/18/2024)    Overall Financial Resource

## 2024-10-08 NOTE — PROGRESS NOTES
Chief Complaint   Patient presents with    Diabetes     3 month f/u    Hypertension    Cholesterol Problem       SUBJECTIVE:    Yves Boyd is a 89 y.o. male who returns in follow-up for his medical problems include hypertension, hyperlipidemia, diabetes, DJD, CKD stage III, obesity and other medical problems.  He is taking his medications trying to follow his diet remains physically active.  Currently he notes no chest pain, shortness of breath or cardiac respiratory complaints.  There are no current GI or  complaints.  He has no headaches, dizziness or neurologic complaints.  There are no current active arthritic complaints and there are no other complaints on complete review of systems.      Current Outpatient Medications   Medication Sig Dispense Refill    lisinopril (PRINIVIL;ZESTRIL) 40 MG tablet Take 1 tablet by mouth daily      metFORMIN (GLUCOPHAGE-XR) 500 MG extended release tablet Take 2 tablets by mouth twice daily 360 tablet 3    chlorthalidone (HYGROTON) 25 MG tablet Take 1 tablet by mouth once daily 90 tablet 3    glimepiride (AMARYL) 4 MG tablet TAKE 1 TABLET BY MOUTH IN THE MORNING 90 tablet 3    ezetimibe (ZETIA) 10 MG tablet TAKE 1 TABLET BY MOUTH ONCE DAILY AT BEDTIME 90 tablet 3    amLODIPine (NORVASC) 5 MG tablet Take 1 tablet by mouth once daily 90 tablet 3    simvastatin (ZOCOR) 20 MG tablet TAKE 1 TABLET BY MOUTH ONCE DAILY AT NIGHT 90 tablet 3    Omega-3 Fatty Acids (FISH OIL PO) Take by mouth 1500 mg      aspirin 81 MG EC tablet Take by mouth daily      niacin 500 MG tablet Take 1 tablet by mouth every morning (before breakfast)      SITagliptin (JANUVIA) 100 MG tablet Take 1 tablet by mouth daily       No current facility-administered medications for this visit.     Past Medical History:   Diagnosis Date    Cancer (HCC)     prostate    Cardiac arrhythmia 7/20/2017    Colon cancer screening 7/20/2017    Diabetes (HCC)     Diabetes (HCC)     Type II    DJD (degenerative joint

## 2024-10-09 ENCOUNTER — OFFICE VISIT (OUTPATIENT)
Facility: CLINIC | Age: 89
End: 2024-10-09
Payer: MEDICARE

## 2024-10-09 VITALS
HEART RATE: 62 BPM | SYSTOLIC BLOOD PRESSURE: 138 MMHG | BODY MASS INDEX: 34 KG/M2 | OXYGEN SATURATION: 96 % | RESPIRATION RATE: 16 BRPM | WEIGHT: 216.6 LBS | DIASTOLIC BLOOD PRESSURE: 72 MMHG | HEIGHT: 67 IN | TEMPERATURE: 97.6 F

## 2024-10-09 DIAGNOSIS — E78.2 MIXED HYPERLIPIDEMIA: ICD-10-CM

## 2024-10-09 DIAGNOSIS — I10 ESSENTIAL HYPERTENSION: Primary | ICD-10-CM

## 2024-10-09 DIAGNOSIS — M15.0 PRIMARY OSTEOARTHRITIS INVOLVING MULTIPLE JOINTS: ICD-10-CM

## 2024-10-09 DIAGNOSIS — N18.30 STAGE 3 CHRONIC KIDNEY DISEASE, UNSPECIFIED WHETHER STAGE 3A OR 3B CKD (HCC): ICD-10-CM

## 2024-10-09 DIAGNOSIS — Z23 NEEDS FLU SHOT: ICD-10-CM

## 2024-10-09 DIAGNOSIS — N18.30 CONTROLLED TYPE 2 DIABETES MELLITUS WITH STAGE 3 CHRONIC KIDNEY DISEASE, WITHOUT LONG-TERM CURRENT USE OF INSULIN (HCC): ICD-10-CM

## 2024-10-09 DIAGNOSIS — E11.22 CONTROLLED TYPE 2 DIABETES MELLITUS WITH STAGE 3 CHRONIC KIDNEY DISEASE, WITHOUT LONG-TERM CURRENT USE OF INSULIN (HCC): ICD-10-CM

## 2024-10-09 PROCEDURE — G8482 FLU IMMUNIZE ORDER/ADMIN: HCPCS | Performed by: INTERNAL MEDICINE

## 2024-10-09 PROCEDURE — G0008 ADMIN INFLUENZA VIRUS VAC: HCPCS | Performed by: INTERNAL MEDICINE

## 2024-10-09 PROCEDURE — 1036F TOBACCO NON-USER: CPT | Performed by: INTERNAL MEDICINE

## 2024-10-09 PROCEDURE — 99214 OFFICE O/P EST MOD 30 MIN: CPT | Performed by: INTERNAL MEDICINE

## 2024-10-09 PROCEDURE — 90653 IIV ADJUVANT VACCINE IM: CPT | Performed by: INTERNAL MEDICINE

## 2024-10-09 PROCEDURE — G8427 DOCREV CUR MEDS BY ELIG CLIN: HCPCS | Performed by: INTERNAL MEDICINE

## 2024-10-09 PROCEDURE — G8417 CALC BMI ABV UP PARAM F/U: HCPCS | Performed by: INTERNAL MEDICINE

## 2024-10-09 PROCEDURE — 1123F ACP DISCUSS/DSCN MKR DOCD: CPT | Performed by: INTERNAL MEDICINE

## 2024-10-09 PROCEDURE — 3044F HG A1C LEVEL LT 7.0%: CPT | Performed by: INTERNAL MEDICINE

## 2024-10-09 NOTE — PROGRESS NOTES
Yves Boyd is a 89 y.o. male     Chief Complaint   Patient presents with    Diabetes     3 month f/u    Hypertension    Cholesterol Problem       BP (!) 140/70 (Site: Left Upper Arm, Position: Sitting, Cuff Size: Large Adult)   Pulse 62   Temp 97.6 °F (36.4 °C) (Oral)   Resp 16   Ht 1.702 m (5' 7\")   Wt 98.2 kg (216 lb 9.6 oz)   SpO2 96%   BMI 33.92 kg/m²     Health Maintenance Due   Topic Date Due    Pneumococcal 65+ years Vaccine (1 of 2 - PCV) 04/02/1941    DTaP/Tdap/Td vaccine (1 - Tdap) Never done    Shingles vaccine (1 of 2) Never done    Respiratory Syncytial Virus (RSV) Pregnant or age 60 yrs+ (1 - 1-dose 60+ series) Never done    Flu vaccine (1) 08/01/2024    COVID-19 Vaccine (4 - 2023-24 season) 09/01/2024         \"Have you been to the ER, urgent care clinic since your last visit?  Hospitalized since your last visit?\"    NO    “Have you seen or consulted any other health care providers outside of [facility] since your last visit?”    NO

## 2024-10-10 LAB
ALBUMIN SERPL-MCNC: 4.2 G/DL (ref 3.5–5)
ALBUMIN/GLOB SERPL: 1.2 (ref 1.1–2.2)
ALP SERPL-CCNC: 58 U/L (ref 45–117)
ALT SERPL-CCNC: 22 U/L (ref 12–78)
ANION GAP SERPL CALC-SCNC: 7 MMOL/L (ref 2–12)
AST SERPL-CCNC: 24 U/L (ref 15–37)
BILIRUB SERPL-MCNC: 1.4 MG/DL (ref 0.2–1)
BUN SERPL-MCNC: 40 MG/DL (ref 6–20)
BUN/CREAT SERPL: 17 (ref 12–20)
CALCIUM SERPL-MCNC: 10.8 MG/DL (ref 8.5–10.1)
CHLORIDE SERPL-SCNC: 109 MMOL/L (ref 97–108)
CHOLEST SERPL-MCNC: 102 MG/DL
CK SERPL-CCNC: 114 U/L (ref 39–308)
CO2 SERPL-SCNC: 24 MMOL/L (ref 21–32)
CREAT SERPL-MCNC: 2.38 MG/DL (ref 0.7–1.3)
EST. AVERAGE GLUCOSE BLD GHB EST-MCNC: 143 MG/DL
GLOBULIN SER CALC-MCNC: 3.5 G/DL (ref 2–4)
GLUCOSE SERPL-MCNC: 97 MG/DL (ref 65–100)
HBA1C MFR BLD: 6.6 % (ref 4–5.6)
HDLC SERPL-MCNC: 43 MG/DL
HDLC SERPL: 2.4 (ref 0–5)
LDLC SERPL CALC-MCNC: 31.4 MG/DL (ref 0–100)
POTASSIUM SERPL-SCNC: 4.4 MMOL/L (ref 3.5–5.1)
PROT SERPL-MCNC: 7.7 G/DL (ref 6.4–8.2)
SODIUM SERPL-SCNC: 140 MMOL/L (ref 136–145)
TRIGL SERPL-MCNC: 138 MG/DL
VLDLC SERPL CALC-MCNC: 27.6 MG/DL

## 2024-11-20 RX ORDER — SIMVASTATIN 20 MG
TABLET ORAL
Qty: 90 TABLET | Refills: 0 | Status: SHIPPED | OUTPATIENT
Start: 2024-11-20

## 2024-11-20 RX ORDER — GLIMEPIRIDE 4 MG/1
4 TABLET ORAL EVERY MORNING
Qty: 90 TABLET | Refills: 0 | Status: SHIPPED | OUTPATIENT
Start: 2024-11-20

## 2024-11-20 RX ORDER — EZETIMIBE 10 MG/1
10 TABLET ORAL
Qty: 90 TABLET | Refills: 0 | Status: SHIPPED | OUTPATIENT
Start: 2024-11-20

## 2024-11-20 RX ORDER — LISINOPRIL 40 MG/1
40 TABLET ORAL DAILY
Qty: 90 TABLET | Refills: 0 | Status: SHIPPED | OUTPATIENT
Start: 2024-11-20

## 2024-11-20 RX ORDER — CHLORTHALIDONE 25 MG/1
TABLET ORAL
Qty: 90 TABLET | Refills: 0 | Status: SHIPPED | OUTPATIENT
Start: 2024-11-20

## 2024-11-20 RX ORDER — AMLODIPINE BESYLATE 5 MG/1
5 TABLET ORAL DAILY
Qty: 90 TABLET | Refills: 0 | Status: SHIPPED | OUTPATIENT
Start: 2024-11-20

## 2024-11-20 RX ORDER — METFORMIN HYDROCHLORIDE 500 MG/1
TABLET, EXTENDED RELEASE ORAL
Qty: 360 TABLET | Refills: 0 | Status: SHIPPED | OUTPATIENT
Start: 2024-11-20

## 2024-11-20 NOTE — TELEPHONE ENCOUNTER
PCP: Kareem Alfonso MD    Last appt: 10/9/2024  Future Appointments   Date Time Provider Department Center   12/17/2024 10:40 AM Kareem Alfonso MD [facility]       Requested Prescriptions     Pending Prescriptions Disp Refills    glimepiride (AMARYL) 4 MG tablet [Pharmacy Med Name: Glimepiride 4 MG Oral Tablet] 90 tablet 0     Sig: TAKE 1 TABLET BY MOUTH IN THE MORNING    chlorthalidone (HYGROTON) 25 MG tablet [Pharmacy Med Name: Chlorthalidone 25 MG Oral Tablet] 90 tablet 0     Sig: Take 1 tablet by mouth once daily    lisinopril (PRINIVIL;ZESTRIL) 40 MG tablet [Pharmacy Med Name: Lisinopril 40 MG Oral Tablet] 90 tablet 0     Sig: Take 1 tablet by mouth once daily    ezetimibe (ZETIA) 10 MG tablet [Pharmacy Med Name: Ezetimibe 10 MG Oral Tablet] 90 tablet 0     Sig: TAKE 1 TABLET BY MOUTH ONCE DAILY AT BEDTIME    simvastatin (ZOCOR) 20 MG tablet [Pharmacy Med Name: Simvastatin 20 MG Oral Tablet] 90 tablet 0     Sig: TAKE 1 TABLET BY MOUTH ONCE DAILY AT NIGHT    amLODIPine (NORVASC) 5 MG tablet [Pharmacy Med Name: amLODIPine Besylate 5 MG Oral Tablet] 90 tablet 0     Sig: Take 1 tablet by mouth once daily    metFORMIN (GLUCOPHAGE-XR) 500 MG extended release tablet [Pharmacy Med Name: metFORMIN HCl  MG Oral Tablet Extended Release 24 Hour] 360 tablet 0     Sig: Take 2 tablets by mouth twice daily       Prior labs and Blood pressures:  BP Readings from Last 3 Encounters:   10/09/24 138/72   06/18/24 136/71   03/12/24 (!) 147/82     Lab Results   Component Value Date/Time     10/09/2024 10:23 AM    K 4.4 10/09/2024 10:23 AM     10/09/2024 10:23 AM    CO2 24 10/09/2024 10:23 AM    BUN 40 10/09/2024 10:23 AM    GFRAA 37 09/30/2022 11:48 AM     No results found for: \"HBA1C\", \"XUI5ACTF\"  Lab Results   Component Value Date/Time    CHOL 102 10/09/2024 10:23 AM    HDL 43 10/09/2024 10:23 AM    LDL 31.4 10/09/2024 10:23 AM    LDL 28.4 03/12/2024 10:06 AM    VLDL 27.6 10/09/2024 10:23 AM

## 2024-12-17 ENCOUNTER — OFFICE VISIT (OUTPATIENT)
Facility: CLINIC | Age: 88
End: 2024-12-17

## 2024-12-17 VITALS
WEIGHT: 215.2 LBS | RESPIRATION RATE: 18 BRPM | HEIGHT: 67 IN | DIASTOLIC BLOOD PRESSURE: 60 MMHG | BODY MASS INDEX: 33.78 KG/M2 | SYSTOLIC BLOOD PRESSURE: 130 MMHG | HEART RATE: 80 BPM | OXYGEN SATURATION: 95 %

## 2024-12-17 DIAGNOSIS — C61 PROSTATE CANCER (HCC): ICD-10-CM

## 2024-12-17 DIAGNOSIS — E55.9 VITAMIN D DEFICIENCY: ICD-10-CM

## 2024-12-17 DIAGNOSIS — M35.3 POLYMYALGIA RHEUMATICA (HCC): ICD-10-CM

## 2024-12-17 DIAGNOSIS — I10 ESSENTIAL HYPERTENSION: Primary | ICD-10-CM

## 2024-12-17 DIAGNOSIS — Z87.891 FORMER SMOKER: ICD-10-CM

## 2024-12-17 DIAGNOSIS — M15.0 PRIMARY OSTEOARTHRITIS INVOLVING MULTIPLE JOINTS: ICD-10-CM

## 2024-12-17 DIAGNOSIS — E66.09 CLASS 1 OBESITY DUE TO EXCESS CALORIES WITHOUT SERIOUS COMORBIDITY WITH BODY MASS INDEX (BMI) OF 34.0 TO 34.9 IN ADULT: ICD-10-CM

## 2024-12-17 DIAGNOSIS — N18.30 CONTROLLED TYPE 2 DIABETES MELLITUS WITH STAGE 3 CHRONIC KIDNEY DISEASE, WITHOUT LONG-TERM CURRENT USE OF INSULIN (HCC): ICD-10-CM

## 2024-12-17 DIAGNOSIS — Z13.39 ALCOHOL SCREENING: ICD-10-CM

## 2024-12-17 DIAGNOSIS — E11.22 CONTROLLED TYPE 2 DIABETES MELLITUS WITH STAGE 3 CHRONIC KIDNEY DISEASE, WITHOUT LONG-TERM CURRENT USE OF INSULIN (HCC): ICD-10-CM

## 2024-12-17 DIAGNOSIS — N18.30 STAGE 3 CHRONIC KIDNEY DISEASE, UNSPECIFIED WHETHER STAGE 3A OR 3B CKD (HCC): ICD-10-CM

## 2024-12-17 DIAGNOSIS — Z00.00 MEDICARE ANNUAL WELLNESS VISIT, SUBSEQUENT: ICD-10-CM

## 2024-12-17 DIAGNOSIS — E78.2 MIXED HYPERLIPIDEMIA: ICD-10-CM

## 2024-12-17 DIAGNOSIS — E66.811 CLASS 1 OBESITY DUE TO EXCESS CALORIES WITHOUT SERIOUS COMORBIDITY WITH BODY MASS INDEX (BMI) OF 34.0 TO 34.9 IN ADULT: ICD-10-CM

## 2024-12-17 ASSESSMENT — PATIENT HEALTH QUESTIONNAIRE - PHQ9
SUM OF ALL RESPONSES TO PHQ QUESTIONS 1-9: 0
SUM OF ALL RESPONSES TO PHQ QUESTIONS 1-9: 0
SUM OF ALL RESPONSES TO PHQ9 QUESTIONS 1 & 2: 0
SUM OF ALL RESPONSES TO PHQ QUESTIONS 1-9: 0
2. FEELING DOWN, DEPRESSED OR HOPELESS: NOT AT ALL
1. LITTLE INTEREST OR PLEASURE IN DOING THINGS: NOT AT ALL
SUM OF ALL RESPONSES TO PHQ QUESTIONS 1-9: 0

## 2024-12-17 ASSESSMENT — LIFESTYLE VARIABLES
HOW OFTEN DO YOU HAVE A DRINK CONTAINING ALCOHOL: NEVER
HOW MANY STANDARD DRINKS CONTAINING ALCOHOL DO YOU HAVE ON A TYPICAL DAY: PATIENT DOES NOT DRINK

## 2024-12-17 NOTE — PROGRESS NOTES
Yves Boyd is a 89 y.o. male     Chief Complaint   Patient presents with    Medicare AWV       /60 (Site: Left Upper Arm, Position: Sitting, Cuff Size: Large Adult)   Pulse 80   Resp 18   Ht 1.702 m (5' 7\")   Wt 97.6 kg (215 lb 3.2 oz)   SpO2 95%   BMI 33.71 kg/m²     Health Maintenance Due   Topic Date Due    Pneumococcal 65+ years Vaccine (1 of 2 - PCV) 04/02/1941    DTaP/Tdap/Td vaccine (1 - Tdap) Never done    Shingles vaccine (1 of 2) Never done    Respiratory Syncytial Virus (RSV) Pregnant or age 60 yrs+ (1 - 1-dose 75+ series) Never done    COVID-19 Vaccine (4 - 2023-24 season) 09/01/2024    Annual Wellness Visit (Medicare)  12/12/2024    Prostate Specific Antigen (PSA) Screening or Monitoring  12/12/2024         \"Have you been to the ER, urgent care clinic since your last visit?  Hospitalized since your last visit?\"    NO    “Have you seen or consulted any other health care providers outside of [facility] since your last visit?”    NO

## 2024-12-17 NOTE — PROGRESS NOTES
This is a Subsequent Medicare Annual Wellness Visit providing Personalized Prevention Plan Services (PPPS) (Performed 12 months after initial AWV and PPPS )    I have reviewed the patient's medical history in detail and updated the computerized patient record.  He presents today for his Medicare subsequent annual wellness examination and screening questionnaire.    He is also in follow-up of his multiple medical problems include hypertension, diabetes, hyperlipidemia, history of polymyalgia rheumatica in the arm resolved, DJD, CKD stage III, prostate cancer post prostatectomy years ago, obesity, former smoking history, vitamin D deficiency, and other multimedical problems.  He is taking his medications trying to follow his diet and remains physically active although limited a little by his age of almost 89.  He notes no chest pain, shortness of breath or cardiac respiratory complaints.  He has no current GI or  complaints.  He has no headaches, dizziness or neurologic complaints.  He has no change of his chronic arthritic complaints and there are no other complaints on complete review of systems.    History     Past Medical History:   Diagnosis Date    Cancer (HCC)     prostate    Cardiac arrhythmia 2017    Colon cancer screening 2017    Diabetes (HCC)     Diabetes (HCC)     Type II    DJD (degenerative joint disease) 2017    Former smoker 2017    Hypercholesterolemia     Hyperlipidemia 2017    Hypertension     Hypertension with renal disease 2017    On statin therapy 2017    Polymyalgia rheumatica (HCC) 2017      Past Surgical History:   Procedure Laterality Date    PROSTATECTOMY      PROSTATECTOMY           Social History     Tobacco Use    Smoking status: Former     Current packs/day: 0.00     Average packs/day: 1 pack/day for 53.0 years (53.0 ttl pk-yrs)     Types: Cigarettes     Start date: 1955     Quit date: 2008     Years since quittin.4

## 2024-12-17 NOTE — PATIENT INSTRUCTIONS
issues such as depression, anxiety, or family problems that can make it hard to focus on weight loss.  Consider joining a support group for people who are trying to lose weight. Your doctor can suggest groups in your area.  Where can you learn more?  Go to https://www.[employer].net/patientEd and enter U357 to learn more about \"Starting a Weight Loss Plan: Care Instructions.\"  Current as of: September 20, 2023  Content Version: 14.2  © 2024 [employer].   Care instructions adapted under license by [employer]. If you have questions about a medical condition or this instruction, always ask your healthcare professional. [employer], [employer] disclaims any warranty or liability for your use of this information.           A Healthy Heart: Care Instructions  Overview     Coronary artery disease, also called heart disease, occurs when a substance called plaque builds up in the vessels that supply oxygen-rich blood to your heart muscle. This can narrow the blood vessels and reduce blood flow. A heart attack happens when blood flow is completely blocked. A high-fat diet, smoking, and other factors increase the risk of heart disease.  Your doctor has found that you have a chance of having heart disease. A heart-healthy lifestyle can help keep your heart healthy and prevent heart disease. This lifestyle includes eating healthy, being active, staying at a weight that's healthy for you, and not smoking or using tobacco. It also includes taking medicines as directed, managing other health conditions, and trying to get a healthy amount of sleep.  Follow-up care is a key part of your treatment and safety. Be sure to make and go to all appointments, and call your doctor if you are having problems. It's also a good idea to know your test results and keep a list of the medicines you take.  How can you care for yourself at home?  Diet    Use less salt when you cook and eat. This helps lower your blood pressure. Taste

## 2024-12-18 LAB
25(OH)D3 SERPL-MCNC: 58.3 NG/ML (ref 30–100)
ALBUMIN SERPL-MCNC: 4.1 G/DL (ref 3.5–5)
ALBUMIN/GLOB SERPL: 1.2 (ref 1.1–2.2)
ALP SERPL-CCNC: 64 U/L (ref 45–117)
ALT SERPL-CCNC: 20 U/L (ref 12–78)
ANION GAP SERPL CALC-SCNC: 9 MMOL/L (ref 2–12)
APPEARANCE UR: CLEAR
AST SERPL-CCNC: 19 U/L (ref 15–37)
BACTERIA URNS QL MICRO: NEGATIVE /HPF
BASOPHILS # BLD: 0.1 K/UL (ref 0–0.1)
BASOPHILS NFR BLD: 1 % (ref 0–1)
BILIRUB SERPL-MCNC: 1.2 MG/DL (ref 0.2–1)
BILIRUB UR QL: NEGATIVE
BUN SERPL-MCNC: 51 MG/DL (ref 6–20)
BUN/CREAT SERPL: 18 (ref 12–20)
CALCIUM SERPL-MCNC: 9.6 MG/DL (ref 8.5–10.1)
CHLORIDE SERPL-SCNC: 108 MMOL/L (ref 97–108)
CHOLEST SERPL-MCNC: 96 MG/DL
CK SERPL-CCNC: 179 U/L (ref 39–308)
CO2 SERPL-SCNC: 23 MMOL/L (ref 21–32)
COLOR UR: ABNORMAL
CREAT SERPL-MCNC: 2.82 MG/DL (ref 0.7–1.3)
CREAT UR-MCNC: 177 MG/DL
DIFFERENTIAL METHOD BLD: ABNORMAL
EOSINOPHIL # BLD: 0.1 K/UL (ref 0–0.4)
EOSINOPHIL NFR BLD: 1 % (ref 0–7)
EPITH CASTS URNS QL MICRO: ABNORMAL /LPF
ERYTHROCYTE [DISTWIDTH] IN BLOOD BY AUTOMATED COUNT: 14.6 % (ref 11.5–14.5)
EST. AVERAGE GLUCOSE BLD GHB EST-MCNC: 140 MG/DL
GLOBULIN SER CALC-MCNC: 3.4 G/DL (ref 2–4)
GLUCOSE SERPL-MCNC: 86 MG/DL (ref 65–100)
GLUCOSE UR STRIP.AUTO-MCNC: NEGATIVE MG/DL
HBA1C MFR BLD: 6.5 % (ref 4–5.6)
HCT VFR BLD AUTO: 41.8 % (ref 36.6–50.3)
HDLC SERPL-MCNC: 42 MG/DL
HDLC SERPL: 2.3 (ref 0–5)
HGB BLD-MCNC: 13 G/DL (ref 12.1–17)
HGB UR QL STRIP: NEGATIVE
HYALINE CASTS URNS QL MICRO: ABNORMAL /LPF (ref 0–5)
IMM GRANULOCYTES # BLD AUTO: 0 K/UL (ref 0–0.04)
IMM GRANULOCYTES NFR BLD AUTO: 1 % (ref 0–0.5)
KETONES UR QL STRIP.AUTO: NEGATIVE MG/DL
LDLC SERPL CALC-MCNC: 30.2 MG/DL (ref 0–100)
LEUKOCYTE ESTERASE UR QL STRIP.AUTO: NEGATIVE
LYMPHOCYTES # BLD: 2.1 K/UL (ref 0.8–3.5)
LYMPHOCYTES NFR BLD: 27 % (ref 12–49)
MCH RBC QN AUTO: 30.7 PG (ref 26–34)
MCHC RBC AUTO-ENTMCNC: 31.1 G/DL (ref 30–36.5)
MCV RBC AUTO: 98.6 FL (ref 80–99)
MICROALBUMIN UR-MCNC: 27.6 MG/DL
MICROALBUMIN/CREAT UR-RTO: 156 MG/G (ref 0–30)
MONOCYTES # BLD: 0.9 K/UL (ref 0–1)
MONOCYTES NFR BLD: 11 % (ref 5–13)
NEUTS SEG # BLD: 4.7 K/UL (ref 1.8–8)
NEUTS SEG NFR BLD: 59 % (ref 32–75)
NITRITE UR QL STRIP.AUTO: NEGATIVE
NRBC # BLD: 0 K/UL (ref 0–0.01)
NRBC BLD-RTO: 0 PER 100 WBC
PH UR STRIP: 5 (ref 5–8)
PLATELET # BLD AUTO: 262 K/UL (ref 150–400)
PMV BLD AUTO: 11.9 FL (ref 8.9–12.9)
POTASSIUM SERPL-SCNC: 5 MMOL/L (ref 3.5–5.1)
PROT SERPL-MCNC: 7.5 G/DL (ref 6.4–8.2)
PROT UR STRIP-MCNC: 100 MG/DL
PSA SERPL-MCNC: 0.3 NG/ML (ref 0.01–4)
RBC # BLD AUTO: 4.24 M/UL (ref 4.1–5.7)
RBC #/AREA URNS HPF: ABNORMAL /HPF (ref 0–5)
SODIUM SERPL-SCNC: 140 MMOL/L (ref 136–145)
SP GR UR REFRACTOMETRY: 1.02 (ref 1–1.03)
T4 FREE SERPL-MCNC: 1.3 NG/DL (ref 0.8–1.5)
TRIGL SERPL-MCNC: 119 MG/DL
TSH SERPL DL<=0.05 MIU/L-ACNC: 1.43 UIU/ML (ref 0.36–3.74)
UROBILINOGEN UR QL STRIP.AUTO: 0.2 EU/DL (ref 0.2–1)
VLDLC SERPL CALC-MCNC: 23.8 MG/DL
WBC # BLD AUTO: 7.8 K/UL (ref 4.1–11.1)
WBC URNS QL MICRO: ABNORMAL /HPF (ref 0–4)

## 2025-01-15 PROBLEM — Z00.00 MEDICARE ANNUAL WELLNESS VISIT, SUBSEQUENT: Status: RESOLVED | Noted: 2017-10-27 | Resolved: 2025-01-15

## 2025-02-03 ENCOUNTER — OFFICE VISIT (OUTPATIENT)
Facility: CLINIC | Age: 89
End: 2025-02-03
Payer: MEDICARE

## 2025-02-03 VITALS
SYSTOLIC BLOOD PRESSURE: 124 MMHG | BODY MASS INDEX: 33.65 KG/M2 | RESPIRATION RATE: 16 BRPM | OXYGEN SATURATION: 97 % | HEIGHT: 67 IN | DIASTOLIC BLOOD PRESSURE: 72 MMHG | WEIGHT: 214.4 LBS | HEART RATE: 63 BPM | TEMPERATURE: 97.4 F

## 2025-02-03 DIAGNOSIS — I10 ESSENTIAL HYPERTENSION: Primary | ICD-10-CM

## 2025-02-03 DIAGNOSIS — N18.30 CONTROLLED TYPE 2 DIABETES MELLITUS WITH STAGE 3 CHRONIC KIDNEY DISEASE, WITHOUT LONG-TERM CURRENT USE OF INSULIN (HCC): ICD-10-CM

## 2025-02-03 DIAGNOSIS — C61 PROSTATE CANCER (HCC): ICD-10-CM

## 2025-02-03 DIAGNOSIS — N18.32 STAGE 3B CHRONIC KIDNEY DISEASE (HCC): ICD-10-CM

## 2025-02-03 DIAGNOSIS — E11.22 CONTROLLED TYPE 2 DIABETES MELLITUS WITH STAGE 3 CHRONIC KIDNEY DISEASE, WITHOUT LONG-TERM CURRENT USE OF INSULIN (HCC): ICD-10-CM

## 2025-02-03 PROCEDURE — 1036F TOBACCO NON-USER: CPT | Performed by: INTERNAL MEDICINE

## 2025-02-03 PROCEDURE — G8427 DOCREV CUR MEDS BY ELIG CLIN: HCPCS | Performed by: INTERNAL MEDICINE

## 2025-02-03 PROCEDURE — 99214 OFFICE O/P EST MOD 30 MIN: CPT | Performed by: INTERNAL MEDICINE

## 2025-02-03 PROCEDURE — 1126F AMNT PAIN NOTED NONE PRSNT: CPT | Performed by: INTERNAL MEDICINE

## 2025-02-03 PROCEDURE — G8417 CALC BMI ABV UP PARAM F/U: HCPCS | Performed by: INTERNAL MEDICINE

## 2025-02-03 PROCEDURE — 1159F MED LIST DOCD IN RCRD: CPT | Performed by: INTERNAL MEDICINE

## 2025-02-03 PROCEDURE — 1123F ACP DISCUSS/DSCN MKR DOCD: CPT | Performed by: INTERNAL MEDICINE

## 2025-02-03 PROCEDURE — 1160F RVW MEDS BY RX/DR IN RCRD: CPT | Performed by: INTERNAL MEDICINE

## 2025-02-03 ASSESSMENT — PATIENT HEALTH QUESTIONNAIRE - PHQ9
SUM OF ALL RESPONSES TO PHQ QUESTIONS 1-9: 0
SUM OF ALL RESPONSES TO PHQ9 QUESTIONS 1 & 2: 0
1. LITTLE INTEREST OR PLEASURE IN DOING THINGS: NOT AT ALL
SUM OF ALL RESPONSES TO PHQ QUESTIONS 1-9: 0
2. FEELING DOWN, DEPRESSED OR HOPELESS: NOT AT ALL

## 2025-02-03 NOTE — PROGRESS NOTES
Yves Boyd is a 89 y.o. male     Chief Complaint   Patient presents with    Discuss Labs     Patient stated Dr. Alfonso wanted to talk to patient about lab results.       BP (!) 140/70 (Site: Left Upper Arm, Position: Sitting, Cuff Size: Large Adult)   Pulse 63   Temp 97.4 °F (36.3 °C) (Oral)   Resp 16   Ht 1.702 m (5' 7\")   Wt 97.3 kg (214 lb 6.4 oz)   SpO2 97%   BMI 33.58 kg/m²     Health Maintenance Due   Topic Date Due    Pneumococcal 65+ years Vaccine (1 of 2 - PCV) 04/02/1941    DTaP/Tdap/Td vaccine (1 - Tdap) Never done    Shingles vaccine (1 of 2) Never done    Respiratory Syncytial Virus (RSV) Pregnant or age 60 yrs+ (1 - 1-dose 75+ series) Never done    COVID-19 Vaccine (4 - 2023-24 season) 09/01/2024         \"Have you been to the ER, urgent care clinic since your last visit?  Hospitalized since your last visit?\"    NO    “Have you seen or consulted any other health care providers outside of [facility] since your last visit?”    NO

## 2025-02-03 NOTE — PROGRESS NOTES
Chief Complaint   Patient presents with    Discuss Labs     Patient stated Dr. Alfonso wanted to talk to patient about lab results.       SUBJECTIVE:    Yves Boyd is a 89 y.o. male returns in follow-up regarding his hypertension, CKD with baseline 3 most recently stage IV, diabetes and other medical problems.  He was most recent seen by me on December 17 and repeat renal function returned with deterioration with a creatinine up to 2.8.  He was notified on 22 December to discontinue his Hygroton and follow-up in a month but he had gone out of town left his phone and did not  the message until just recently.  He now returns in follow-up and has still been taking the Hygroton.  He does have good urine output he denies any lightheadedness or dizziness.  He denies any chest pain, shortness of breath, palpitations, PND, orthopnea or other cardiac or respiratory complaints.  He has no GI or  complaints.  He has no headaches, dizziness or neurologic complaints.      Current Outpatient Medications   Medication Sig Dispense Refill    glimepiride (AMARYL) 4 MG tablet TAKE 1 TABLET BY MOUTH IN THE MORNING 90 tablet 0    lisinopril (PRINIVIL;ZESTRIL) 40 MG tablet Take 1 tablet by mouth once daily 90 tablet 0    ezetimibe (ZETIA) 10 MG tablet TAKE 1 TABLET BY MOUTH ONCE DAILY AT BEDTIME 90 tablet 0    simvastatin (ZOCOR) 20 MG tablet TAKE 1 TABLET BY MOUTH ONCE DAILY AT NIGHT 90 tablet 0    amLODIPine (NORVASC) 5 MG tablet Take 1 tablet by mouth once daily 90 tablet 0    metFORMIN (GLUCOPHAGE-XR) 500 MG extended release tablet Take 2 tablets by mouth twice daily 360 tablet 0    Omega-3 Fatty Acids (FISH OIL PO) Take by mouth 1500 mg      aspirin 81 MG EC tablet Take by mouth daily      niacin 500 MG tablet Take 1 tablet by mouth every morning (before breakfast)      SITagliptin (JANUVIA) 100 MG tablet Take 1 tablet by mouth daily       No current facility-administered medications for this visit.     Past

## 2025-02-04 LAB
ANION GAP SERPL CALC-SCNC: 9 MMOL/L (ref 2–12)
BUN SERPL-MCNC: 45 MG/DL (ref 6–20)
BUN/CREAT SERPL: 17 (ref 12–20)
CALCIUM SERPL-MCNC: 10.7 MG/DL (ref 8.5–10.1)
CHLORIDE SERPL-SCNC: 106 MMOL/L (ref 97–108)
CO2 SERPL-SCNC: 23 MMOL/L (ref 21–32)
CREAT SERPL-MCNC: 2.71 MG/DL (ref 0.7–1.3)
GLUCOSE SERPL-MCNC: 107 MG/DL (ref 65–100)
POTASSIUM SERPL-SCNC: 4.6 MMOL/L (ref 3.5–5.1)
SODIUM SERPL-SCNC: 138 MMOL/L (ref 136–145)

## 2025-02-18 RX ORDER — AMLODIPINE BESYLATE 5 MG/1
5 TABLET ORAL DAILY
Qty: 90 TABLET | Refills: 0 | Status: SHIPPED | OUTPATIENT
Start: 2025-02-18

## 2025-02-18 RX ORDER — EZETIMIBE 10 MG/1
10 TABLET ORAL
Qty: 90 TABLET | Refills: 0 | Status: SHIPPED | OUTPATIENT
Start: 2025-02-18

## 2025-02-18 RX ORDER — LISINOPRIL 40 MG/1
40 TABLET ORAL DAILY
Qty: 90 TABLET | Refills: 0 | Status: SHIPPED | OUTPATIENT
Start: 2025-02-18

## 2025-02-18 RX ORDER — METFORMIN HYDROCHLORIDE 500 MG/1
TABLET, EXTENDED RELEASE ORAL
Qty: 360 TABLET | Refills: 0 | Status: SHIPPED | OUTPATIENT
Start: 2025-02-18

## 2025-02-18 RX ORDER — GLIMEPIRIDE 4 MG/1
4 TABLET ORAL EVERY MORNING
Qty: 90 TABLET | Refills: 0 | Status: SHIPPED | OUTPATIENT
Start: 2025-02-18

## 2025-02-18 RX ORDER — SIMVASTATIN 20 MG
TABLET ORAL
Qty: 90 TABLET | Refills: 0 | Status: SHIPPED | OUTPATIENT
Start: 2025-02-18

## 2025-02-18 NOTE — TELEPHONE ENCOUNTER
PCP: Kaerem Alfonso MD    Last appt: 2/3/2025    Future Appointments   Date Time Provider Department Center   3/19/2025  9:30 AM Kareem Alfonso MD [facility]       Requested Prescriptions     Pending Prescriptions Disp Refills    lisinopril (PRINIVIL;ZESTRIL) 40 MG tablet [Pharmacy Med Name: Lisinopril 40 MG Oral Tablet] 90 tablet 0     Sig: Take 1 tablet by mouth once daily    glimepiride (AMARYL) 4 MG tablet [Pharmacy Med Name: Glimepiride 4 MG Oral Tablet] 90 tablet 0     Sig: TAKE 1 TABLET BY MOUTH IN THE MORNING    ezetimibe (ZETIA) 10 MG tablet [Pharmacy Med Name: Ezetimibe 10 MG Oral Tablet] 90 tablet 0     Sig: TAKE 1 TABLET BY MOUTH ONCE DAILY AT BEDTIME    amLODIPine (NORVASC) 5 MG tablet [Pharmacy Med Name: amLODIPine Besylate 5 MG Oral Tablet] 90 tablet 0     Sig: Take 1 tablet by mouth once daily    simvastatin (ZOCOR) 20 MG tablet [Pharmacy Med Name: Simvastatin 20 MG Oral Tablet] 90 tablet 0     Sig: TAKE 1 TABLET BY MOUTH ONCE DAILY AT NIGHT    metFORMIN (GLUCOPHAGE-XR) 500 MG extended release tablet [Pharmacy Med Name: metFORMIN HCl  MG Oral Tablet Extended Release 24 Hour] 360 tablet 0     Sig: Take 2 tablets by mouth twice daily

## 2025-03-18 NOTE — PROGRESS NOTES
Chief Complaint   Patient presents with    Hypertension     Pt comes in today for a 3 month follow up,    Diabetes       SUBJECTIVE:    Yves Boyd is a 89 y.o. male who returns in follow-up of his medical problems include hypertension, diabetes, hyperlipidemia, DJD, obesity and other medical problems.  He is taking his medications trying to follow his diet remains physically active.  Currently he notes no chest pain, shortness of breath or cardiac respiratory complaints.  There are no current GI or  complaints.  He has no headaches, dizziness or new neurologic complaints.  There are no current active arthritic complaints but he does have his chronic unchanged joint aches and pains.  There are no other complaints on complete review of systems.      Current Outpatient Medications   Medication Sig Dispense Refill    lisinopril (PRINIVIL;ZESTRIL) 40 MG tablet Take 1 tablet by mouth once daily 90 tablet 0    glimepiride (AMARYL) 4 MG tablet TAKE 1 TABLET BY MOUTH IN THE MORNING 90 tablet 0    ezetimibe (ZETIA) 10 MG tablet TAKE 1 TABLET BY MOUTH ONCE DAILY AT BEDTIME 90 tablet 0    amLODIPine (NORVASC) 5 MG tablet Take 1 tablet by mouth once daily 90 tablet 0    simvastatin (ZOCOR) 20 MG tablet TAKE 1 TABLET BY MOUTH ONCE DAILY AT NIGHT 90 tablet 0    metFORMIN (GLUCOPHAGE-XR) 500 MG extended release tablet Take 2 tablets by mouth twice daily 360 tablet 0    Omega-3 Fatty Acids (FISH OIL PO) Take by mouth 1500 mg      aspirin 81 MG EC tablet Take by mouth daily      niacin 500 MG tablet Take 1 tablet by mouth every morning (before breakfast)      SITagliptin (JANUVIA) 100 MG tablet Take 1 tablet by mouth daily       No current facility-administered medications for this visit.     Past Medical History:   Diagnosis Date    Cancer (HCC)     prostate    Cardiac arrhythmia 7/20/2017    Colon cancer screening 7/20/2017    Diabetes (HCC)     Diabetes (HCC)     Type II    DJD (degenerative joint disease) 7/20/2017

## 2025-03-19 ENCOUNTER — OFFICE VISIT (OUTPATIENT)
Facility: CLINIC | Age: 89
End: 2025-03-19
Payer: MEDICARE

## 2025-03-19 VITALS
WEIGHT: 213.5 LBS | OXYGEN SATURATION: 93 % | SYSTOLIC BLOOD PRESSURE: 136 MMHG | BODY MASS INDEX: 33.44 KG/M2 | DIASTOLIC BLOOD PRESSURE: 80 MMHG | HEART RATE: 70 BPM

## 2025-03-19 DIAGNOSIS — E66.811 CLASS 1 OBESITY DUE TO EXCESS CALORIES WITHOUT SERIOUS COMORBIDITY WITH BODY MASS INDEX (BMI) OF 34.0 TO 34.9 IN ADULT: ICD-10-CM

## 2025-03-19 DIAGNOSIS — C61 PROSTATE CANCER (HCC): ICD-10-CM

## 2025-03-19 DIAGNOSIS — N18.30 CONTROLLED TYPE 2 DIABETES MELLITUS WITH STAGE 3 CHRONIC KIDNEY DISEASE, WITHOUT LONG-TERM CURRENT USE OF INSULIN (HCC): ICD-10-CM

## 2025-03-19 DIAGNOSIS — E11.22 CONTROLLED TYPE 2 DIABETES MELLITUS WITH STAGE 3 CHRONIC KIDNEY DISEASE, WITHOUT LONG-TERM CURRENT USE OF INSULIN (HCC): ICD-10-CM

## 2025-03-19 DIAGNOSIS — E78.2 MIXED HYPERLIPIDEMIA: ICD-10-CM

## 2025-03-19 DIAGNOSIS — M15.0 PRIMARY OSTEOARTHRITIS INVOLVING MULTIPLE JOINTS: ICD-10-CM

## 2025-03-19 DIAGNOSIS — I10 ESSENTIAL HYPERTENSION: Primary | ICD-10-CM

## 2025-03-19 DIAGNOSIS — E66.09 CLASS 1 OBESITY DUE TO EXCESS CALORIES WITHOUT SERIOUS COMORBIDITY WITH BODY MASS INDEX (BMI) OF 34.0 TO 34.9 IN ADULT: ICD-10-CM

## 2025-03-19 DIAGNOSIS — N18.32 STAGE 3B CHRONIC KIDNEY DISEASE (HCC): ICD-10-CM

## 2025-03-19 LAB
ALBUMIN SERPL-MCNC: 4.1 G/DL (ref 3.5–5)
ALBUMIN/GLOB SERPL: 1.2 (ref 1.1–2.2)
ALP SERPL-CCNC: 58 U/L (ref 45–117)
ALT SERPL-CCNC: 24 U/L (ref 12–78)
ANION GAP SERPL CALC-SCNC: 5 MMOL/L (ref 2–12)
AST SERPL-CCNC: 21 U/L (ref 15–37)
BILIRUB SERPL-MCNC: 1.3 MG/DL (ref 0.2–1)
BUN SERPL-MCNC: 36 MG/DL (ref 6–20)
BUN/CREAT SERPL: 15 (ref 12–20)
CALCIUM SERPL-MCNC: 10.4 MG/DL (ref 8.5–10.1)
CHLORIDE SERPL-SCNC: 110 MMOL/L (ref 97–108)
CHOLEST SERPL-MCNC: 90 MG/DL
CK SERPL-CCNC: 108 U/L (ref 39–308)
CO2 SERPL-SCNC: 25 MMOL/L (ref 21–32)
CREAT SERPL-MCNC: 2.44 MG/DL (ref 0.7–1.3)
EST. AVERAGE GLUCOSE BLD GHB EST-MCNC: 143 MG/DL
GLOBULIN SER CALC-MCNC: 3.3 G/DL (ref 2–4)
GLUCOSE SERPL-MCNC: 80 MG/DL (ref 65–100)
HBA1C MFR BLD: 6.6 % (ref 4–5.6)
HDLC SERPL-MCNC: 40 MG/DL
HDLC SERPL: 2.3 (ref 0–5)
LDLC SERPL CALC-MCNC: 25 MG/DL (ref 0–100)
POTASSIUM SERPL-SCNC: 5.1 MMOL/L (ref 3.5–5.1)
PROT SERPL-MCNC: 7.4 G/DL (ref 6.4–8.2)
SODIUM SERPL-SCNC: 140 MMOL/L (ref 136–145)
TRIGL SERPL-MCNC: 125 MG/DL
VLDLC SERPL CALC-MCNC: 25 MG/DL

## 2025-03-19 PROCEDURE — G8417 CALC BMI ABV UP PARAM F/U: HCPCS | Performed by: INTERNAL MEDICINE

## 2025-03-19 PROCEDURE — 1159F MED LIST DOCD IN RCRD: CPT | Performed by: INTERNAL MEDICINE

## 2025-03-19 PROCEDURE — 1123F ACP DISCUSS/DSCN MKR DOCD: CPT | Performed by: INTERNAL MEDICINE

## 2025-03-19 PROCEDURE — 1036F TOBACCO NON-USER: CPT | Performed by: INTERNAL MEDICINE

## 2025-03-19 PROCEDURE — 99214 OFFICE O/P EST MOD 30 MIN: CPT | Performed by: INTERNAL MEDICINE

## 2025-03-19 PROCEDURE — 1126F AMNT PAIN NOTED NONE PRSNT: CPT | Performed by: INTERNAL MEDICINE

## 2025-03-19 PROCEDURE — 1160F RVW MEDS BY RX/DR IN RCRD: CPT | Performed by: INTERNAL MEDICINE

## 2025-03-19 PROCEDURE — G8427 DOCREV CUR MEDS BY ELIG CLIN: HCPCS | Performed by: INTERNAL MEDICINE

## 2025-03-24 ENCOUNTER — RESULTS FOLLOW-UP (OUTPATIENT)
Facility: CLINIC | Age: 89
End: 2025-03-24

## 2025-05-19 NOTE — TELEPHONE ENCOUNTER
PCP: Kareem Alfonso MD    Last appt: 3/19/2025    Future Appointments   Date Time Provider Department Center   6/20/2025 10:00 AM Kareem Alfonso MD [facility]       Requested Prescriptions     Pending Prescriptions Disp Refills    lisinopril (PRINIVIL;ZESTRIL) 40 MG tablet [Pharmacy Med Name: Lisinopril 40 MG Oral Tablet] 90 tablet 0     Sig: Take 1 tablet by mouth once daily    glimepiride (AMARYL) 4 MG tablet [Pharmacy Med Name: Glimepiride 4 MG Oral Tablet] 90 tablet 0     Sig: TAKE 1 TABLET BY MOUTH IN THE MORNING    ezetimibe (ZETIA) 10 MG tablet [Pharmacy Med Name: Ezetimibe 10 MG Oral Tablet] 90 tablet 0     Sig: TAKE 1 TABLET BY MOUTH ONCE DAILY AT BEDTIME    amLODIPine (NORVASC) 5 MG tablet [Pharmacy Med Name: amLODIPine Besylate 5 MG Oral Tablet] 90 tablet 0     Sig: Take 1 tablet by mouth once daily    metFORMIN (GLUCOPHAGE-XR) 500 MG extended release tablet [Pharmacy Med Name: metFORMIN HCl  MG Oral Tablet Extended Release 24 Hour] 360 tablet 0     Sig: Take 2 tablets by mouth twice daily    simvastatin (ZOCOR) 20 MG tablet [Pharmacy Med Name: Simvastatin 20 MG Oral Tablet] 90 tablet 0     Sig: TAKE 1 TABLET BY MOUTH ONCE DAILY AT NIGHT

## 2025-05-20 RX ORDER — GLIMEPIRIDE 4 MG/1
4 TABLET ORAL EVERY MORNING
Qty: 90 TABLET | Refills: 0 | Status: SHIPPED | OUTPATIENT
Start: 2025-05-20

## 2025-05-20 RX ORDER — LISINOPRIL 40 MG/1
40 TABLET ORAL DAILY
Qty: 90 TABLET | Refills: 0 | Status: SHIPPED | OUTPATIENT
Start: 2025-05-20

## 2025-05-20 RX ORDER — EZETIMIBE 10 MG/1
10 TABLET ORAL
Qty: 90 TABLET | Refills: 0 | Status: SHIPPED | OUTPATIENT
Start: 2025-05-20

## 2025-05-20 RX ORDER — AMLODIPINE BESYLATE 5 MG/1
5 TABLET ORAL DAILY
Qty: 90 TABLET | Refills: 0 | Status: SHIPPED | OUTPATIENT
Start: 2025-05-20

## 2025-05-20 RX ORDER — SIMVASTATIN 20 MG
20 TABLET ORAL NIGHTLY
Qty: 90 TABLET | Refills: 0 | Status: SHIPPED | OUTPATIENT
Start: 2025-05-20

## 2025-05-20 RX ORDER — METFORMIN HYDROCHLORIDE 500 MG/1
1000 TABLET, EXTENDED RELEASE ORAL 2 TIMES DAILY
Qty: 360 TABLET | Refills: 0 | Status: SHIPPED | OUTPATIENT
Start: 2025-05-20

## 2025-06-19 NOTE — PROGRESS NOTES
Chief Complaint   Patient presents with    Hypertension     Pt comes in today for a 3 month follow up.        SUBJECTIVE:    Yves Boyd is a 89 y.o. male who returns in follow-up for his medical problems include hypertension, hyperlipidemia, diabetes, DJD, CKD stage III, obesity, former smoking history and other medical Pross.  He is taking his medication Tr[employer] follows diet remains physically active.  He currently notes no chest pain, shortness of breath or cardiac or respiratory complaints.  There are no current GI or  complaints.  There are no headaches, dizziness or neurologic complaints.  There are no current active arthritic complaints and no other complaints on complete review of systems.      Current Outpatient Medications   Medication Sig Dispense Refill    lisinopril (PRINIVIL;ZESTRIL) 40 MG tablet Take 1 tablet by mouth once daily 90 tablet 0    glimepiride (AMARYL) 4 MG tablet TAKE 1 TABLET BY MOUTH IN THE MORNING 90 tablet 0    ezetimibe (ZETIA) 10 MG tablet TAKE 1 TABLET BY MOUTH ONCE DAILY AT BEDTIME 90 tablet 0    amLODIPine (NORVASC) 5 MG tablet Take 1 tablet by mouth once daily 90 tablet 0    metFORMIN (GLUCOPHAGE-XR) 500 MG extended release tablet Take 2 tablets by mouth twice daily 360 tablet 0    simvastatin (ZOCOR) 20 MG tablet TAKE 1 TABLET BY MOUTH ONCE DAILY AT NIGHT 90 tablet 0    Omega-3 Fatty Acids (FISH OIL PO) Take by mouth 1500 mg      aspirin 81 MG EC tablet Take by mouth daily      niacin 500 MG tablet Take 1 tablet by mouth every morning (before breakfast)      SITagliptin (JANUVIA) 100 MG tablet Take 1 tablet by mouth daily       No current facility-administered medications for this visit.     Past Medical History:   Diagnosis Date    Cancer (HCC)     prostate    Cardiac arrhythmia 7/20/2017    Colon cancer screening 7/20/2017    Diabetes (HCC)     Diabetes (HCC)     Type II    DJD (degenerative joint disease) 7/20/2017    Former smoker 7/20/2017    Hypercholesterolemia

## 2025-06-20 ENCOUNTER — OFFICE VISIT (OUTPATIENT)
Facility: CLINIC | Age: 89
End: 2025-06-20

## 2025-06-20 VITALS
WEIGHT: 219.5 LBS | BODY MASS INDEX: 34.38 KG/M2 | OXYGEN SATURATION: 94 % | DIASTOLIC BLOOD PRESSURE: 84 MMHG | HEART RATE: 74 BPM | TEMPERATURE: 97.4 F | SYSTOLIC BLOOD PRESSURE: 138 MMHG

## 2025-06-20 DIAGNOSIS — E66.09 CLASS 1 OBESITY DUE TO EXCESS CALORIES WITHOUT SERIOUS COMORBIDITY WITH BODY MASS INDEX (BMI) OF 34.0 TO 34.9 IN ADULT: ICD-10-CM

## 2025-06-20 DIAGNOSIS — E11.22 CONTROLLED TYPE 2 DIABETES MELLITUS WITH STAGE 3 CHRONIC KIDNEY DISEASE, WITHOUT LONG-TERM CURRENT USE OF INSULIN (HCC): ICD-10-CM

## 2025-06-20 DIAGNOSIS — M15.0 PRIMARY OSTEOARTHRITIS INVOLVING MULTIPLE JOINTS: ICD-10-CM

## 2025-06-20 DIAGNOSIS — N18.30 CONTROLLED TYPE 2 DIABETES MELLITUS WITH STAGE 3 CHRONIC KIDNEY DISEASE, WITHOUT LONG-TERM CURRENT USE OF INSULIN (HCC): ICD-10-CM

## 2025-06-20 DIAGNOSIS — E78.2 MIXED HYPERLIPIDEMIA: ICD-10-CM

## 2025-06-20 DIAGNOSIS — E66.811 CLASS 1 OBESITY DUE TO EXCESS CALORIES WITHOUT SERIOUS COMORBIDITY WITH BODY MASS INDEX (BMI) OF 34.0 TO 34.9 IN ADULT: ICD-10-CM

## 2025-06-20 DIAGNOSIS — I10 ESSENTIAL HYPERTENSION: Primary | ICD-10-CM

## 2025-06-20 DIAGNOSIS — N18.32 STAGE 3B CHRONIC KIDNEY DISEASE (HCC): ICD-10-CM

## 2025-06-21 LAB
ALBUMIN SERPL-MCNC: 3.9 G/DL (ref 3.5–5)
ALBUMIN/GLOB SERPL: 1.3 (ref 1.1–2.2)
ALP SERPL-CCNC: 53 U/L (ref 45–117)
ALT SERPL-CCNC: 20 U/L (ref 12–78)
ANION GAP SERPL CALC-SCNC: 7 MMOL/L (ref 2–12)
AST SERPL-CCNC: 32 U/L (ref 15–37)
BILIRUB SERPL-MCNC: 1.5 MG/DL (ref 0.2–1)
BUN SERPL-MCNC: 31 MG/DL (ref 6–20)
BUN/CREAT SERPL: 14 (ref 12–20)
CALCIUM SERPL-MCNC: 10 MG/DL (ref 8.5–10.1)
CHLORIDE SERPL-SCNC: 110 MMOL/L (ref 97–108)
CHOLEST SERPL-MCNC: 87 MG/DL
CK SERPL-CCNC: 161 U/L (ref 39–308)
CO2 SERPL-SCNC: 25 MMOL/L (ref 21–32)
CREAT SERPL-MCNC: 2.28 MG/DL (ref 0.7–1.3)
EST. AVERAGE GLUCOSE BLD GHB EST-MCNC: 148 MG/DL
GLOBULIN SER CALC-MCNC: 3.1 G/DL (ref 2–4)
GLUCOSE SERPL-MCNC: 78 MG/DL (ref 65–100)
HBA1C MFR BLD: 6.8 % (ref 4–5.6)
HDLC SERPL-MCNC: 45 MG/DL
HDLC SERPL: 1.9 (ref 0–5)
LDLC SERPL CALC-MCNC: 23 MG/DL (ref 0–100)
POTASSIUM SERPL-SCNC: 5 MMOL/L (ref 3.5–5.1)
PROT SERPL-MCNC: 7 G/DL (ref 6.4–8.2)
SODIUM SERPL-SCNC: 142 MMOL/L (ref 136–145)
TRIGL SERPL-MCNC: 95 MG/DL
VLDLC SERPL CALC-MCNC: 19 MG/DL

## 2025-06-28 ENCOUNTER — RESULTS FOLLOW-UP (OUTPATIENT)
Facility: CLINIC | Age: 89
End: 2025-06-28

## 2025-08-25 RX ORDER — EZETIMIBE 10 MG/1
10 TABLET ORAL
Qty: 90 TABLET | Refills: 0 | Status: SHIPPED | OUTPATIENT
Start: 2025-08-25

## 2025-08-25 RX ORDER — SIMVASTATIN 20 MG
20 TABLET ORAL NIGHTLY
Qty: 90 TABLET | Refills: 0 | Status: SHIPPED | OUTPATIENT
Start: 2025-08-25

## 2025-08-25 RX ORDER — GLIMEPIRIDE 4 MG/1
4 TABLET ORAL EVERY MORNING
Qty: 90 TABLET | Refills: 0 | Status: SHIPPED | OUTPATIENT
Start: 2025-08-25

## 2025-08-25 RX ORDER — METFORMIN HYDROCHLORIDE 500 MG/1
1000 TABLET, EXTENDED RELEASE ORAL 2 TIMES DAILY
Qty: 360 TABLET | Refills: 0 | Status: SHIPPED | OUTPATIENT
Start: 2025-08-25

## 2025-08-25 RX ORDER — LISINOPRIL 40 MG/1
40 TABLET ORAL DAILY
Qty: 90 TABLET | Refills: 0 | Status: SHIPPED | OUTPATIENT
Start: 2025-08-25

## 2025-08-25 RX ORDER — AMLODIPINE BESYLATE 5 MG/1
5 TABLET ORAL DAILY
Qty: 90 TABLET | Refills: 0 | Status: SHIPPED | OUTPATIENT
Start: 2025-08-25